# Patient Record
Sex: FEMALE | Race: WHITE | NOT HISPANIC OR LATINO | Employment: OTHER | RURAL
[De-identification: names, ages, dates, MRNs, and addresses within clinical notes are randomized per-mention and may not be internally consistent; named-entity substitution may affect disease eponyms.]

---

## 2024-01-14 ENCOUNTER — HOSPITAL ENCOUNTER (INPATIENT)
Facility: HOSPITAL | Age: 89
LOS: 9 days | Discharge: NURSING, REHAB OR LONG-TERM CARE | DRG: 189 | End: 2024-01-23
Attending: EMERGENCY MEDICINE | Admitting: STUDENT IN AN ORGANIZED HEALTH CARE EDUCATION/TRAINING PROGRAM
Payer: MEDICARE

## 2024-01-14 ENCOUNTER — HOSPITAL ENCOUNTER (EMERGENCY)
Facility: HOSPITAL | Age: 89
Discharge: ANOTHER HOSPITAL | End: 2024-01-14
Payer: MEDICARE

## 2024-01-14 VITALS
SYSTOLIC BLOOD PRESSURE: 102 MMHG | DIASTOLIC BLOOD PRESSURE: 93 MMHG | OXYGEN SATURATION: 97 % | BODY MASS INDEX: 30.37 KG/M2 | TEMPERATURE: 98 F | HEART RATE: 136 BPM | HEIGHT: 66 IN | WEIGHT: 189 LBS | RESPIRATION RATE: 32 BRPM

## 2024-01-14 DIAGNOSIS — I48.91 ATRIAL FIBRILLATION WITH RAPID VENTRICULAR RESPONSE: ICD-10-CM

## 2024-01-14 DIAGNOSIS — R06.02 SOB (SHORTNESS OF BREATH): ICD-10-CM

## 2024-01-14 DIAGNOSIS — I34.0 NONRHEUMATIC MITRAL VALVE REGURGITATION: ICD-10-CM

## 2024-01-14 DIAGNOSIS — I50.9 CONGESTIVE HEART FAILURE, UNSPECIFIED HF CHRONICITY, UNSPECIFIED HEART FAILURE TYPE: ICD-10-CM

## 2024-01-14 DIAGNOSIS — J90 PLEURAL EFFUSION: ICD-10-CM

## 2024-01-14 DIAGNOSIS — I48.91 ATRIAL FIBRILLATION WITH RAPID VENTRICULAR RESPONSE: Primary | ICD-10-CM

## 2024-01-14 DIAGNOSIS — I21.4 NSTEMI (NON-ST ELEVATED MYOCARDIAL INFARCTION): ICD-10-CM

## 2024-01-14 DIAGNOSIS — I50.9 ACUTE CONGESTIVE HEART FAILURE, UNSPECIFIED HEART FAILURE TYPE: Primary | ICD-10-CM

## 2024-01-14 DIAGNOSIS — I48.91 ATRIAL FIBRILLATION WITH RVR: ICD-10-CM

## 2024-01-14 DIAGNOSIS — J96.01 ACUTE RESPIRATORY FAILURE WITH HYPOXIA: ICD-10-CM

## 2024-01-14 DIAGNOSIS — I50.21 ACUTE SYSTOLIC (CONGESTIVE) HEART FAILURE: ICD-10-CM

## 2024-01-14 DIAGNOSIS — R07.9 CHEST PAIN, UNSPECIFIED TYPE: ICD-10-CM

## 2024-01-14 DIAGNOSIS — R06.02 SHORTNESS OF BREATH: ICD-10-CM

## 2024-01-14 DIAGNOSIS — J18.9 PNEUMONIA OF RIGHT LUNG DUE TO INFECTIOUS ORGANISM, UNSPECIFIED PART OF LUNG: ICD-10-CM

## 2024-01-14 DIAGNOSIS — I10 MALIGNANT HYPERTENSION: ICD-10-CM

## 2024-01-14 PROBLEM — I16.0 HYPERTENSIVE URGENCY: Status: ACTIVE | Noted: 2024-01-14

## 2024-01-14 LAB
ALBUMIN SERPL BCP-MCNC: 3.6 G/DL (ref 3.5–5)
ALBUMIN/GLOB SERPL: 0.8 {RATIO}
ALP SERPL-CCNC: 82 U/L (ref 55–142)
ALT SERPL W P-5'-P-CCNC: 73 U/L (ref 13–56)
ANION GAP SERPL CALCULATED.3IONS-SCNC: 10 MMOL/L (ref 7–16)
ANION GAP SERPL CALCULATED.3IONS-SCNC: 11 MMOL/L (ref 7–16)
AST SERPL W P-5'-P-CCNC: 43 U/L (ref 15–37)
BACTERIA #/AREA URNS HPF: ABNORMAL /HPF
BILIRUB SERPL-MCNC: 0.5 MG/DL (ref ?–1.2)
BILIRUB UR QL STRIP: ABNORMAL
BUN SERPL-MCNC: 25 MG/DL (ref 7–18)
BUN SERPL-MCNC: 27 MG/DL (ref 7–18)
BUN/CREAT SERPL: 27 (ref 6–20)
BUN/CREAT SERPL: 29 (ref 6–20)
CALCIUM SERPL-MCNC: 9.4 MG/DL (ref 8.5–10.1)
CALCIUM SERPL-MCNC: 9.9 MG/DL (ref 8.5–10.1)
CHLORIDE SERPL-SCNC: 100 MMOL/L (ref 98–107)
CHLORIDE SERPL-SCNC: 99 MMOL/L (ref 98–107)
CLARITY UR: ABNORMAL
CO2 SERPL-SCNC: 28 MMOL/L (ref 21–32)
CO2 SERPL-SCNC: 28 MMOL/L (ref 21–32)
COLOR UR: YELLOW
CREAT SERPL-MCNC: 0.92 MG/DL (ref 0.55–1.02)
CREAT SERPL-MCNC: 0.92 MG/DL (ref 0.55–1.02)
D DIMER PPP FEU-MCNC: 4.29 ΜG/ML (ref 0–0.47)
EGFR (NO RACE VARIABLE) (RUSH/TITUS): 59 ML/MIN/1.73M2
EGFR (NO RACE VARIABLE) (RUSH/TITUS): 59 ML/MIN/1.73M2
ERYTHROCYTE [DISTWIDTH] IN BLOOD BY AUTOMATED COUNT: 13.4 % (ref 11.5–14.5)
FLUAV AG UPPER RESP QL IA.RAPID: NEGATIVE
FLUBV AG UPPER RESP QL IA.RAPID: NEGATIVE
GLOBULIN SER-MCNC: 4.4 G/DL (ref 2–4)
GLUCOSE SERPL-MCNC: 123 MG/DL (ref 74–106)
GLUCOSE SERPL-MCNC: 128 MG/DL (ref 74–106)
GLUCOSE SERPL-MCNC: 136 MG/DL (ref 70–105)
GLUCOSE UR STRIP-MCNC: NEGATIVE MG/DL
HCO3 UR-SCNC: 27.2 MMOL/L (ref 24–28)
HCT VFR BLD AUTO: 46 % (ref 38–47)
HGB BLD-MCNC: 14.7 G/DL (ref 12–16)
KETONES UR STRIP-SCNC: ABNORMAL MG/DL
LACTATE SERPL-SCNC: 0.9 MMOL/L (ref 0.4–2)
LEUKOCYTE ESTERASE UR QL STRIP: ABNORMAL
LYMPHOCYTES NFR BLD AUTO: 15.9 % (ref 27–41)
LYMPHOCYTES NFR BLD MANUAL: 23 % (ref 27–41)
MAGNESIUM SERPL-MCNC: 2 MG/DL (ref 1.7–2.3)
MAGNESIUM SERPL-MCNC: 2.6 MG/DL (ref 1.7–2.3)
MCH RBC QN AUTO: 30.8 PG (ref 27–31)
MCHC RBC AUTO-ENTMCNC: 32 G/DL (ref 32–36)
MCV RBC AUTO: 96.2 FL (ref 80–96)
MONOCYTES NFR BLD MANUAL: 3 % (ref 2–6)
MPC BLD CALC-MCNC: 10.3 FL (ref 9.4–12.4)
NEUTROPHILS NFR BLD AUTO: 79.7 % (ref 53–65)
NEUTS SEG NFR BLD MANUAL: 74 % (ref 50–62)
NITRITE UR QL STRIP: POSITIVE
NRBC BLD MANUAL-RTO: ABNORMAL %
NT-PROBNP SERPL-MCNC: ABNORMAL PG/ML (ref 1–450)
NT-PROBNP SERPL-MCNC: ABNORMAL PG/ML (ref 1–450)
PCO2 BLDA: 46 MMHG (ref 41–51)
PH SMN: 7.38 [PH] (ref 7.32–7.42)
PH UR STRIP: 5 PH UNITS
PLATELET # BLD AUTO: 263 K/UL (ref 150–400)
PLATELET MORPHOLOGY: NORMAL
PO2 BLDA: 57 MMHG (ref 25–40)
POC BASE EXCESS: 1.6 MMOL/L (ref -2–3)
POC CO2: 28.6 MMOL/L
POC SATURATED O2: 89 %
POTASSIUM SERPL-SCNC: 4.9 MMOL/L (ref 3.5–5.1)
POTASSIUM SERPL-SCNC: 5.2 MMOL/L (ref 3.5–5.1)
PROT SERPL-MCNC: 8 G/DL (ref 6.4–8.2)
PROT UR QL STRIP: 100
RBC # BLD AUTO: 4.78 M/UL (ref 4.2–5.4)
RBC # UR STRIP: ABNORMAL /UL
RBC #/AREA URNS HPF: ABNORMAL /HPF
RBC MORPH BLD: NORMAL
SARS-COV-2 RDRP RESP QL NAA+PROBE: NEGATIVE
SODIUM SERPL-SCNC: 132 MMOL/L (ref 136–145)
SODIUM SERPL-SCNC: 134 MMOL/L (ref 136–145)
SP GR UR STRIP: >=1.03
SQUAMOUS #/AREA URNS LPF: ABNORMAL /LPF
T4 FREE SERPL-MCNC: 1.05 NG/DL (ref 0.76–1.46)
TRANS CELLS #/AREA URNS LPF: ABNORMAL /LPF
TROPONIN I SERPL DL<=0.01 NG/ML-MCNC: 104.4 PG/ML
TROPONIN I SERPL DL<=0.01 NG/ML-MCNC: 109.3 PG/ML
TSH SERPL DL<=0.005 MIU/L-ACNC: 10.4 UIU/ML (ref 0.36–3.74)
UROBILINOGEN UR STRIP-ACNC: 0.2 MG/DL
WBC # BLD AUTO: 7.9 K/UL (ref 4.5–11)
WBC #/AREA URNS HPF: ABNORMAL /HPF

## 2024-01-14 PROCEDURE — 63600175 PHARM REV CODE 636 W HCPCS: Performed by: STUDENT IN AN ORGANIZED HEALTH CARE EDUCATION/TRAINING PROGRAM

## 2024-01-14 PROCEDURE — 25000003 PHARM REV CODE 250: Performed by: STUDENT IN AN ORGANIZED HEALTH CARE EDUCATION/TRAINING PROGRAM

## 2024-01-14 PROCEDURE — 25000003 PHARM REV CODE 250

## 2024-01-14 PROCEDURE — 82962 GLUCOSE BLOOD TEST: CPT

## 2024-01-14 PROCEDURE — 82803 BLOOD GASES ANY COMBINATION: CPT

## 2024-01-14 PROCEDURE — 80048 BASIC METABOLIC PNL TOTAL CA: CPT | Mod: XB | Performed by: STUDENT IN AN ORGANIZED HEALTH CARE EDUCATION/TRAINING PROGRAM

## 2024-01-14 PROCEDURE — 20000000 HC ICU ROOM

## 2024-01-14 PROCEDURE — 99900035 HC TECH TIME PER 15 MIN (STAT)

## 2024-01-14 PROCEDURE — 99900031 HC PATIENT EDUCATION (STAT)

## 2024-01-14 PROCEDURE — 85025 COMPLETE CBC W/AUTO DIFF WBC: CPT

## 2024-01-14 PROCEDURE — 96375 TX/PRO/DX INJ NEW DRUG ADDON: CPT

## 2024-01-14 PROCEDURE — 63600175 PHARM REV CODE 636 W HCPCS

## 2024-01-14 PROCEDURE — 99285 EMERGENCY DEPT VISIT HI MDM: CPT | Mod: 25

## 2024-01-14 PROCEDURE — 87086 URINE CULTURE/COLONY COUNT: CPT

## 2024-01-14 PROCEDURE — 84443 ASSAY THYROID STIM HORMONE: CPT | Performed by: EMERGENCY MEDICINE

## 2024-01-14 PROCEDURE — 83880 ASSAY OF NATRIURETIC PEPTIDE: CPT | Performed by: EMERGENCY MEDICINE

## 2024-01-14 PROCEDURE — 83605 ASSAY OF LACTIC ACID: CPT | Performed by: EMERGENCY MEDICINE

## 2024-01-14 PROCEDURE — 99285 EMERGENCY DEPT VISIT HI MDM: CPT | Mod: GF

## 2024-01-14 PROCEDURE — 83735 ASSAY OF MAGNESIUM: CPT

## 2024-01-14 PROCEDURE — 63600175 PHARM REV CODE 636 W HCPCS: Mod: JZ,JG | Performed by: STUDENT IN AN ORGANIZED HEALTH CARE EDUCATION/TRAINING PROGRAM

## 2024-01-14 PROCEDURE — 94761 N-INVAS EAR/PLS OXIMETRY MLT: CPT

## 2024-01-14 PROCEDURE — 81003 URINALYSIS AUTO W/O SCOPE: CPT

## 2024-01-14 PROCEDURE — 99291 CRITICAL CARE FIRST HOUR: CPT | Mod: ,,, | Performed by: STUDENT IN AN ORGANIZED HEALTH CARE EDUCATION/TRAINING PROGRAM

## 2024-01-14 PROCEDURE — 84145 PROCALCITONIN (PCT): CPT | Mod: 90 | Performed by: STUDENT IN AN ORGANIZED HEALTH CARE EDUCATION/TRAINING PROGRAM

## 2024-01-14 PROCEDURE — 96374 THER/PROPH/DIAG INJ IV PUSH: CPT

## 2024-01-14 PROCEDURE — 94660 CPAP INITIATION&MGMT: CPT

## 2024-01-14 PROCEDURE — 93010 ELECTROCARDIOGRAM REPORT: CPT | Performed by: HOSPITALIST

## 2024-01-14 PROCEDURE — 83880 ASSAY OF NATRIURETIC PEPTIDE: CPT

## 2024-01-14 PROCEDURE — 84484 ASSAY OF TROPONIN QUANT: CPT | Performed by: EMERGENCY MEDICINE

## 2024-01-14 PROCEDURE — 87635 SARS-COV-2 COVID-19 AMP PRB: CPT

## 2024-01-14 PROCEDURE — 93005 ELECTROCARDIOGRAM TRACING: CPT

## 2024-01-14 PROCEDURE — 63600175 PHARM REV CODE 636 W HCPCS: Performed by: EMERGENCY MEDICINE

## 2024-01-14 PROCEDURE — 99291 CRITICAL CARE FIRST HOUR: CPT | Mod: ,,, | Performed by: EMERGENCY MEDICINE

## 2024-01-14 PROCEDURE — 5A09357 ASSISTANCE WITH RESPIRATORY VENTILATION, LESS THAN 24 CONSECUTIVE HOURS, CONTINUOUS POSITIVE AIRWAY PRESSURE: ICD-10-PCS | Performed by: STUDENT IN AN ORGANIZED HEALTH CARE EDUCATION/TRAINING PROGRAM

## 2024-01-14 PROCEDURE — 27000221 HC OXYGEN, UP TO 24 HOURS

## 2024-01-14 PROCEDURE — 83735 ASSAY OF MAGNESIUM: CPT | Performed by: STUDENT IN AN ORGANIZED HEALTH CARE EDUCATION/TRAINING PROGRAM

## 2024-01-14 PROCEDURE — 87040 BLOOD CULTURE FOR BACTERIA: CPT | Performed by: EMERGENCY MEDICINE

## 2024-01-14 PROCEDURE — 87040 BLOOD CULTURE FOR BACTERIA: CPT | Performed by: STUDENT IN AN ORGANIZED HEALTH CARE EDUCATION/TRAINING PROGRAM

## 2024-01-14 PROCEDURE — 36600 WITHDRAWAL OF ARTERIAL BLOOD: CPT

## 2024-01-14 PROCEDURE — 87804 INFLUENZA ASSAY W/OPTIC: CPT | Mod: 59

## 2024-01-14 PROCEDURE — 27000190 HC CPAP FULL FACE MASK W/VALVE

## 2024-01-14 PROCEDURE — 85379 FIBRIN DEGRADATION QUANT: CPT

## 2024-01-14 PROCEDURE — 25000242 PHARM REV CODE 250 ALT 637 W/ HCPCS

## 2024-01-14 PROCEDURE — 25500020 PHARM REV CODE 255: Performed by: EMERGENCY MEDICINE

## 2024-01-14 PROCEDURE — 84484 ASSAY OF TROPONIN QUANT: CPT

## 2024-01-14 PROCEDURE — 84439 ASSAY OF FREE THYROXINE: CPT | Performed by: EMERGENCY MEDICINE

## 2024-01-14 PROCEDURE — 80053 COMPREHEN METABOLIC PANEL: CPT

## 2024-01-14 RX ORDER — POLYETHYLENE GLYCOL 3350 17 G/17G
17 POWDER, FOR SOLUTION ORAL 2 TIMES DAILY PRN
Status: DISCONTINUED | OUTPATIENT
Start: 2024-01-14 | End: 2024-01-23 | Stop reason: HOSPADM

## 2024-01-14 RX ORDER — AMIODARONE HYDROCHLORIDE 150 MG/3ML
150 INJECTION, SOLUTION INTRAVENOUS
Status: COMPLETED | OUTPATIENT
Start: 2024-01-14 | End: 2024-01-14

## 2024-01-14 RX ORDER — DILTIAZEM HYDROCHLORIDE 5 MG/ML
10 INJECTION INTRAVENOUS
Status: COMPLETED | OUTPATIENT
Start: 2024-01-14 | End: 2024-01-14

## 2024-01-14 RX ORDER — DILTIAZEM HYDROCHLORIDE 5 MG/ML
10 INJECTION INTRAVENOUS
Status: DISCONTINUED | OUTPATIENT
Start: 2024-01-14 | End: 2024-01-14 | Stop reason: HOSPADM

## 2024-01-14 RX ORDER — DOCUSATE SODIUM 100 MG/1
100 CAPSULE, LIQUID FILLED ORAL DAILY PRN
Status: DISCONTINUED | OUTPATIENT
Start: 2024-01-14 | End: 2024-01-23 | Stop reason: HOSPADM

## 2024-01-14 RX ORDER — ASPIRIN 325 MG
325 TABLET ORAL
Status: COMPLETED | OUTPATIENT
Start: 2024-01-14 | End: 2024-01-14

## 2024-01-14 RX ORDER — DIGOXIN 0.25 MG/ML
500 INJECTION INTRAMUSCULAR; INTRAVENOUS
Status: COMPLETED | OUTPATIENT
Start: 2024-01-14 | End: 2024-01-14

## 2024-01-14 RX ORDER — ONDANSETRON HYDROCHLORIDE 2 MG/ML
4 INJECTION, SOLUTION INTRAVENOUS EVERY 8 HOURS PRN
Status: DISCONTINUED | OUTPATIENT
Start: 2024-01-14 | End: 2024-01-23 | Stop reason: HOSPADM

## 2024-01-14 RX ORDER — FUROSEMIDE 10 MG/ML
40 INJECTION INTRAMUSCULAR; INTRAVENOUS
Status: COMPLETED | OUTPATIENT
Start: 2024-01-14 | End: 2024-01-14

## 2024-01-14 RX ORDER — INSULIN ASPART 100 [IU]/ML
0-5 INJECTION, SOLUTION INTRAVENOUS; SUBCUTANEOUS EVERY 6 HOURS PRN
Status: DISCONTINUED | OUTPATIENT
Start: 2024-01-14 | End: 2024-01-23 | Stop reason: HOSPADM

## 2024-01-14 RX ORDER — GLUCAGON 1 MG
1 KIT INJECTION
Status: DISCONTINUED | OUTPATIENT
Start: 2024-01-14 | End: 2024-01-23 | Stop reason: HOSPADM

## 2024-01-14 RX ORDER — FUROSEMIDE 10 MG/ML
20 INJECTION INTRAMUSCULAR; INTRAVENOUS
Status: COMPLETED | OUTPATIENT
Start: 2024-01-14 | End: 2024-01-14

## 2024-01-14 RX ORDER — NAPROXEN SODIUM 220 MG
220 TABLET ORAL 2 TIMES DAILY WITH MEALS
Status: ON HOLD | COMMUNITY
End: 2024-01-23 | Stop reason: HOSPADM

## 2024-01-14 RX ORDER — NITROGLYCERIN 20 MG/100ML
0-400 INJECTION INTRAVENOUS CONTINUOUS
Status: DISCONTINUED | OUTPATIENT
Start: 2024-01-14 | End: 2024-01-16

## 2024-01-14 RX ORDER — IPRATROPIUM BROMIDE AND ALBUTEROL SULFATE 2.5; .5 MG/3ML; MG/3ML
3 SOLUTION RESPIRATORY (INHALATION) EVERY 6 HOURS PRN
Status: DISCONTINUED | OUTPATIENT
Start: 2024-01-14 | End: 2024-01-23 | Stop reason: HOSPADM

## 2024-01-14 RX ORDER — NITROGLYCERIN 20 MG/100ML
0-400 INJECTION INTRAVENOUS CONTINUOUS
Status: DISCONTINUED | OUTPATIENT
Start: 2024-01-14 | End: 2024-01-14

## 2024-01-14 RX ORDER — IPRATROPIUM BROMIDE AND ALBUTEROL SULFATE 2.5; .5 MG/3ML; MG/3ML
3 SOLUTION RESPIRATORY (INHALATION)
Status: COMPLETED | OUTPATIENT
Start: 2024-01-14 | End: 2024-01-14

## 2024-01-14 RX ORDER — SODIUM CHLORIDE 0.9 % (FLUSH) 0.9 %
10 SYRINGE (ML) INJECTION
Status: DISCONTINUED | OUTPATIENT
Start: 2024-01-14 | End: 2024-01-23 | Stop reason: HOSPADM

## 2024-01-14 RX ADMIN — AMIODARONE HYDROCHLORIDE 150 MG: 1.5 INJECTION, SOLUTION INTRAVENOUS at 06:01

## 2024-01-14 RX ADMIN — DIGOXIN 500 MCG: 250 INJECTION, SOLUTION INTRAMUSCULAR; INTRAVENOUS; PARENTERAL at 02:01

## 2024-01-14 RX ADMIN — FUROSEMIDE 20 MG: 10 INJECTION, SOLUTION INTRAMUSCULAR; INTRAVENOUS at 02:01

## 2024-01-14 RX ADMIN — TICAGRELOR 180 MG: 90 TABLET ORAL at 02:01

## 2024-01-14 RX ADMIN — AMIODARONE HYDROCHLORIDE 1 MG/MIN: 1.8 INJECTION, SOLUTION INTRAVENOUS at 06:01

## 2024-01-14 RX ADMIN — NITROGLYCERIN 20 MCG/MIN: 20 INJECTION INTRAVENOUS at 06:01

## 2024-01-14 RX ADMIN — DEXTROSE MONOHYDRATE 1 G: 5 INJECTION INTRAVENOUS at 06:01

## 2024-01-14 RX ADMIN — ASPIRIN 325 MG ORAL TABLET 325 MG: 325 PILL ORAL at 01:01

## 2024-01-14 RX ADMIN — AMIODARONE HYDROCHLORIDE 1 MG/MIN: 1.8 INJECTION, SOLUTION INTRAVENOUS at 10:01

## 2024-01-14 RX ADMIN — AMIODARONE HYDROCHLORIDE 150 MG: 50 INJECTION, SOLUTION INTRAVENOUS at 02:01

## 2024-01-14 RX ADMIN — DILTIAZEM HYDROCHLORIDE 10 MG: 5 INJECTION INTRAVENOUS at 01:01

## 2024-01-14 RX ADMIN — AZITHROMYCIN MONOHYDRATE 500 MG: 500 INJECTION, POWDER, LYOPHILIZED, FOR SOLUTION INTRAVENOUS at 06:01

## 2024-01-14 RX ADMIN — NITROGLYCERIN 10 MCG/MIN: 20 INJECTION INTRAVENOUS at 04:01

## 2024-01-14 RX ADMIN — FUROSEMIDE 40 MG: 10 INJECTION, SOLUTION INTRAMUSCULAR; INTRAVENOUS at 04:01

## 2024-01-14 RX ADMIN — IPRATROPIUM BROMIDE AND ALBUTEROL SULFATE 3 ML: 2.5; .5 SOLUTION RESPIRATORY (INHALATION) at 02:01

## 2024-01-14 RX ADMIN — NITROGLYCERIN 20 MCG/MIN: 20 INJECTION INTRAVENOUS at 07:01

## 2024-01-14 RX ADMIN — IOPAMIDOL 100 ML: 755 INJECTION, SOLUTION INTRAVENOUS at 04:01

## 2024-01-14 NOTE — ED NOTES
MD advise to hold off on Diltiazem while nurse in process Np advise patient was given half of ordered dose. Patient only received 5mg . Held off on other 5mg

## 2024-01-14 NOTE — HPI
89 yo F with no significant medical history, albeit she has not established with any care, presented via OSH with complaints of 3 day history of progressive shortness of breath associated with chest discomfort and found to be in AFib with RVR with transfer in-house and thereafter admission to the ICU for management of severe respiratory distress and AFib with RVR.      On presentation to outside hospital she was noted to be in acute respiratory distress we will with tachypnea, hypoxia with SpO2 85% and found to be in AFib with RVR rates to 150s with no hypotension. While at outside hospital she was started on non-rebreather with course notable for progression in her respiratory distress with plan for transfer in-house for higher level of care.  While there, she received management including diltiazem 5 mg (dose interrupted due to concern for heart failure), digoxin 500 mcg, Lasix 20 mg IV, ticagrelor load 180 mg with transfer thereafter to our ED. on presentation here, she was noted to be in respiratory distress following CTA chest and was initiated on BiPAP.  At time of my assessment, she reports having improvement in her dyspnea with persistent tachypnea.  She is able to speak in full sentences and prefers to communicate by writing down on paper.    At end of my assessment, HR 140s in irregular rhythm, on NIV with SpO2 greater than 95%, BP 150s to 160s over 90s to 110s.  Labs are notable for unremarkable CBC, creatinine 0.92, mild transaminitis with AST/ALT 43/73, NT pro BNP 62132, free T4 within normal limits at 1.05 and troponin flat x2 (104, 109).  ABG reviewed with the P/F of 142.  Imaging with CTA chest notable for compressive atelectasis versus consolidation in right lower lobe and bilateral right greater than left pleural effusion, LA and RA dilation and no PE.  Cardiology was consulted during ED course with initiation of a nitroglycerin drip for management of hypertensive urgency.

## 2024-01-14 NOTE — ED NOTES
Patient 02 Saturation started to decrease, Michael Np was notified and patient was placed on a nonrebreather. She is also now complaining of right shoulder pain, and posterior rib pain. She is also requiring to sit at 90 degree to feel less short of breath

## 2024-01-14 NOTE — ED PROVIDER NOTES
Encounter Date: 1/14/2024       History     Chief Complaint   Patient presents with    Shortness of Breath     Patient is a 89-year-old white female who presents to emergency department complaints of shortness of breath and chest pain over the past 3 days.  She reports that the shortness of breath is worse when she lays down flat.  She has a poor historian.  She reports that she does not currently follow up with any providers and season as specialist.  She describes the pain as a pressure in the center of her chest that radiates around to her ribs.  Her blood pressure is 141/96, heart rate 154, respirations 40, oxygen saturation 85% on room air.  She does appear acutely distressed.     The history is provided by the patient.     Review of patient's allergies indicates:   Allergen Reactions    Aspirin      Patient denies any rash or airway issue with aspirin     History reviewed. No pertinent past medical history.  History reviewed. No pertinent surgical history.  History reviewed. No pertinent family history.     Review of Systems   Constitutional:  Negative for activity change, appetite change, chills and fever.   HENT:  Positive for congestion. Negative for sore throat and trouble swallowing.    Eyes: Negative.    Respiratory:  Positive for cough, chest tightness and shortness of breath. Negative for stridor.    Cardiovascular:  Positive for chest pain and palpitations.   Gastrointestinal:  Negative for abdominal pain, diarrhea, nausea and vomiting.   Endocrine: Negative.    Genitourinary:  Negative for difficulty urinating, dysuria and frequency.   Musculoskeletal:  Negative for arthralgias, back pain, neck pain and neck stiffness.   Skin:  Negative for color change, pallor and rash.   Allergic/Immunologic: Negative.    Neurological:  Negative for dizziness, seizures, syncope, facial asymmetry, speech difficulty, weakness, light-headedness and headaches.   Hematological:  Does not bruise/bleed easily.    Psychiatric/Behavioral:  Negative for confusion. The patient is nervous/anxious.    All other systems reviewed and are negative.      Physical Exam     Initial Vitals   BP Pulse Resp Temp SpO2   01/14/24 1324 01/14/24 1324 01/14/24 1324 01/14/24 1421 01/14/24 1324   (!) 141/96 (!) 154 (!) 40 97.7 °F (36.5 °C) (!) 85 %      MAP       --                Physical Exam    Nursing note and vitals reviewed.  Constitutional: She appears well-developed and well-nourished. She appears distressed.   HENT:   Head: Normocephalic and atraumatic.   Mouth/Throat: Oropharynx is clear and moist.   Eyes: Conjunctivae and EOM are normal. Pupils are equal, round, and reactive to light.   Neck: Neck supple.   Normal range of motion.  Cardiovascular:  Normal pulses. An irregular rhythm present.   Tachycardia present.         Pulmonary/Chest: She is in respiratory distress. She has wheezes. She has rales.   Abdominal: Abdomen is soft. Bowel sounds are normal. She exhibits no distension. There is no abdominal tenderness. There is no rebound and no guarding.   Musculoskeletal:         General: Normal range of motion.      Cervical back: Normal range of motion and neck supple.     Neurological: She is alert and oriented to person, place, and time. She has normal strength. GCS score is 15. GCS eye subscore is 4. GCS verbal subscore is 5. GCS motor subscore is 6.   Skin: Skin is warm and dry. Capillary refill takes less than 2 seconds.   Psychiatric: She has a normal mood and affect. Her behavior is normal. Judgment and thought content normal.         Medical Screening Exam   See Full Note    ED Course   Procedures  Labs Reviewed   COMPREHENSIVE METABOLIC PANEL - Abnormal; Notable for the following components:       Result Value    Sodium 132 (*)     Glucose 123 (*)     BUN 25 (*)     BUN/Creatinine Ratio 27 (*)     Globulin 4.4 (*)     ALT 73 (*)     AST 43 (*)     eGFR 59 (*)     All other components within normal limits   NT-PRO  NATRIURETIC PEPTIDE - Abnormal; Notable for the following components:    ProBNP 11,211 (*)     All other components within normal limits   TROPONIN I - Abnormal; Notable for the following components:    Troponin I High Sensitivity 104.4 (*)     All other components within normal limits   URINALYSIS, REFLEX TO URINE CULTURE - Abnormal; Notable for the following components:    Leukocytes, UA Trace (*)     Nitrites, UA Positive (*)     Protein,  (*)     Bilirubin, UA Small (*)     Blood, UA Trace-Intact (*)     Specific Gravity, UA >=1.030 (*)     All other components within normal limits   CBC WITH DIFFERENTIAL - Abnormal; Notable for the following components:    MCV 96.2 (*)     Neutrophils % 79.7 (*)     Lymphocytes % 15.9 (*)     All other components within normal limits   MANUAL DIFFERENTIAL - Abnormal; Notable for the following components:    Segmented Neutrophils, Man % 74 (*)     Lymphocytes, Man % 23 (*)     All other components within normal limits   D DIMER, QUANTITATIVE - Abnormal; Notable for the following components:    D-Dimer 4.29 (*)     All other components within normal limits   RAPID INFLUENZA A/B - Normal   MAGNESIUM - Normal   SARS-COV-2 RNA AMPLIFICATION, QUAL - Normal    Narrative:     Negative SARS-CoV results should not be used as the sole basis for treatment or patient management decisions; negative results should be considered in the context of a patient's recent exposures, history and the presene of clinical signs and symptoms consistent with COVID-19.  Negative results should be treated as presumptive and confirmed by molecular assay, if necessary for patient management.   CBC W/ AUTO DIFFERENTIAL    Narrative:     The following orders were created for panel order CBC auto differential.  Procedure                               Abnormality         Status                     ---------                               -----------         ------                     CBC with  Differential[[id number]]       Abnormal            Final result               Manual Differential[[id number]]         Abnormal            Final result                 Please view results for these tests on the individual orders.   URINALYSIS, MICROSCOPIC          Imaging Results              X-Ray Chest AP Portable (In process)                      Medications   albuterol-ipratropium 2.5 mg-0.5 mg/3 mL nebulizer solution 3 mL (3 mLs Nebulization Given 1/14/24 1403)   diltiaZEM injection 10 mg (10 mg Intravenous Given 1/14/24 1343)   aspirin tablet 325 mg (325 mg Oral Given 1/14/24 1355)   amiodarone injection 150 mg (150 mg Intravenous Given 1/14/24 1454)   digoxin injection 500 mcg (500 mcg Intravenous Given 1/14/24 1413)   ticagrelor tablet 180 mg (180 mg Oral Given 1/14/24 1428)   furosemide injection 20 mg (20 mg Intravenous Given 1/14/24 1422)     Medical Decision Making  Patient presents with shortness of breath or chest pain.  Oxygen saturation on room air was noted to be in the mid 80s.  We did apply nasal cannula initially and ultimately upgraded to a non-rebreather at 100%.  EKGs performed shows atrial fib with RVR at a rate of 130s to 150s.  There was concern for elevation in V1 through V4.  She denies any known history of atrial fib.  Denies any prior formal cardiology evaluation.  We did contact Dr. Ulloa who is on-call for [facility] with concerns related to the patient's AFib with RVR and possible elevation.  He advises no STEMI at this time.  He does recommend that we continue to attempt rate control.  We did initially order Cardizem 10 mg IV and the patient received a proximally 5 mg IV at the time of cardiology consultation.  He recommends that we load the patient with 150 mg of amiodarone, 500 mcg of digoxin, aspirin 325 mg and Brilinta 180 mg.  Also recommends that we repeat the EKG and sent him once she is further rate control.  EKG was repeated at 104 beats per minute after  the 5 mg of Cardizem and 150 mg of amiodarone.  It was reviewed by Dr. Ulloa who confirms no STEMI at this time.  He does recommend that we rule out PE.  We will add a D-dimer.  Patient was accepted to [facility] for an ER to ER transfer by Dr. Carcamo.  Case was discussed with him along with her updated results which included a BNP of greater than 11,000.  Chest x-ray does appear to show some findings consistent with CHF.  We did discuss giving her a 1 time dose of Lasix 20 mg IV here which we will complete before transfer.  We will also place a Nelson catheter. [facility] was contacted as well for transport.    Amount and/or Complexity of Data Reviewed  Independent Historian:      Details: Patient is a 89-year-old white female who presents to emergency department complaints of shortness of breath and chest pain over the past 3 days.  She reports that the shortness of breath is worse when she lays down flat.  She has a poor historian.  She reports that she does not currently follow up with any providers and season as specialist.  She describes the pain as a pressure in the center of her chest that radiates around to her ribs.  Her blood pressure is 141/96, heart rate 154, respirations 40, oxygen saturation 85% on room air.  She does appear acutely distressed.   Labs: ordered.     Details: CBC shows a WBC of 7.90, hemoglobin of 14.7, hematocrit of 46.0, platelet count 263, troponin is elevated at 104.4, [place]NP [place] 26[address], CMP shows a sodium of 132, glucose of 123, BUN 25, normal creatinine of 0.92, BUN/creatinine ratio of 27, albumin 3.6, globulin of 4.4, ALT of 73, AST of 43, and a GFR of 59, magnesium was normal at 2.0.  D-dimer was added and is pending.  Radiology: ordered.     Details: Personal review of the chest x-ray does appear to show some findings consistent with CHF.  Final report pending at the time of transfer.  Discussion of management or test interpretation with external provider(s): Case was  discussed with Dr. Ulloa and Dr. Carcamo at [facility].  Patient was accepted for transfer.  See above for further details.    Risk  OTC drugs.  Prescription drug management.  Risk Details: All historical, clinical, radiographic, and laboratory findings were reviewed with the patient/family in detail along with the indications for transport to the facility in [facility] in order to receive further evaluation and treatment.  All remaining questions and concerns were addressed at this time and the patient/family communicates understanding and agrees to proceed accordingly.  Similarly, all pertinent details of the encounter were discussed with Dr. Carcamo who agrees to receive the patient at [facility] for further care as outlined above.  [facility] secondary to a need for ongoing hemodynamic monitoring en route.  ALVARO CASTAÑEDA  2:29 PM                                           Clinical Impression:   Final diagnoses:  [R06.02] Shortness of breath  [I48.91] Atrial fibrillation with rapid ventricular response (Primary)  [I21.4] NSTEMI (non-ST elevated myocardial infarction)  [R07.9] Chest pain, unspecified type  [R06.02] SOB (shortness of breath)        ED Disposition Condition    Transfer to Another Facility Stable                Chapin Membreno FNP  01/14/24 3524

## 2024-01-14 NOTE — ED PROVIDER NOTES
Encounter Date: 1/14/2024    SCRIBE #1 NOTE: I, Alondra Hooker, am scribing for, and in the presence of,  Raphael Carcamo MD. I have scribed the entire note.       History     Chief Complaint   Patient presents with    Shortness of Breath    Tachycardia     89 year old female presents to the ED via EMS from [facility] complaining of shortness of breath and tachycardia. Patient states that she started having SOB on 1/11 after checking the mail. She states that since then she has been having weakness as well. Patient was seen at [facility] on today complaining of SOB and CP for the past 3 days. Patient denies any Hx of DM, HTN, or MI.     The history is provided by the patient and medical records. No  was used.     Review of patient's allergies indicates:   Allergen Reactions    Aspirin      Patient denies any rash or airway issue with aspirin     No past medical history on file.  No past surgical history on file.  No family history on file.     Review of Systems   Respiratory:  Positive for shortness of breath.    Neurological:  Positive for weakness.       Physical Exam     Initial Vitals [01/14/24 1554]   BP Pulse Resp Temp SpO2   (!) 163/115 (!) 140 (!) 41 -- 95 %      MAP       --         Physical Exam    Nursing note and vitals reviewed.  Cardiovascular:            Tachycardic      Neurological: She is alert and oriented to person, place, and time.         ED Course   Procedures  Labs Reviewed   NT-PRO NATRIURETIC PEPTIDE - Abnormal; Notable for the following components:       Result Value    ProBNP 11,477 (*)     All other components within normal limits   TROPONIN I - Abnormal; Notable for the following components:    Troponin I High Sensitivity 109.3 (*)     All other components within normal limits   TSH - Abnormal; Notable for the following components:    TSH 10.400 (*)     All other components within normal limits   T4, FREE - Normal   CULTURE, BLOOD   CULTURE, BLOOD   LACTIC  ACID, PLASMA          Imaging Results              CTA Chest Non-Coronary (PE Studies) (Final result)  Result time 01/14/24 16:27:26      Final result by Robert Waller MD (01/14/24 16:27:26)                   Impression:      No evidence of acute pulmonary embolic disease    Pulmonary edema with or without superimposed right basilar pneumonia    Moderate layering pleural effusion bilaterally.      Electronically signed by: Robert Waller  Date:    01/14/2024  Time:    16:27               Narrative:    EXAMINATION:  CTA CHEST NON CORONARY (PE STUDIES)    CLINICAL HISTORY:  Pulmonary embolism (PE) suspected, positive D-dimer;.    COMPARISON:  No previous chest CT    TECHNIQUE:  Thin spiral CT sections were obtained through the neck during the dynamic IV administration of 100 ml of Isovue 370.  In addition to multiplanar reconstruction images, 3-D images were also generated, archived, and analyzed.    The CT examination was performed using one or more of the following dose reduction techniques: Automated exposure control, adjustment of the mA and kV according to patient's size, use of acute or iterative reconstruction techniques.    FINDINGS:  There is no discrete filling defect within the pulmonary arterial tree to specifically suggest acute pulmonary embolic disease.    There is no thoracic aorta aneurysm or dissection.  There is a moderate atherosclerotic irregularity and calcification of the aortic arch.    There is no mediastinal mass or mediastinal lymphadenopathy.    There is moderate layering pleural effusion bilaterally.    There is interstitial edema in either lung.  There is some asymmetric patchy airspace disease in the right lower lobe and asymmetric patchy and strandy opacity right middle lobe.  There is passive atelectasis in the lower lobes bilaterally.    There is mild cardiomegaly.    There is no definite acute process in the partially visualized upper abdomen.    There is scattered mild  degenerative disc disease of the thoracic spine.                                    X-Rays:   Independently Interpreted Readings:   Other Readings:  CT Chest Non-Coronary (PE Studies)  No evidence of acute pulmonary embolic disease     Pulmonary edema with or without superimposed right basilar pneumonia     Moderate layering pleural effusion bilaterally.         Medications   cefTRIAXone (ROCEPHIN) 1 g in dextrose 5 % in water (D5W) 100 mL IVPB (MB+) (has no administration in time range)   azithromycin (ZITHROMAX) 500 mg in dextrose 5 % (D5W) 250 mL IVPB (has no administration in time range)   nitroGLYCERIN in 5 % dextrose 50 mg/250 mL (200 mcg/mL) infusion (10 mcg/min Intravenous New Bag 1/14/24 1655)   iopamidoL (ISOVUE-370) injection 100 mL (100 mLs Intravenous Given 1/14/24 1618)   furosemide injection 40 mg (40 mg Intravenous Given 1/14/24 1645)     Medical Decision Making  MDM    Patient presents for emergent evaluation of acute shortness breath that poses a threat to life and/or bodily function.    In the ED patient found to have acute decompensated heart failure.  Tachycardia tachypnea.  Patient was worsening in the emergency department.  She wishes to be do not resuscitate.  She was placed on BiPAP due to severe tachypnea case was discussed with cardiologist and intensivist.  Patient placed on nitroglycerin infusion due to elevated blood pressures.  Also gave an additional dose of Lasix that was additional to the dose that was given in the transferring facility.  Troponin was repeated and was flat.  CTA shows no PE but does have bilateral pleural effusions and consolidation.  Treated empirically with IV antibiotics.  I ordered labs and personally reviewed them.  Labs significant for troponins flat elevated proBNP.    I ordered EKG and personally reviewed it.  EKG significant for tachycardia why complex not consistent with STEMI.    I ordered CT scan and personally reviewed it and reviewed the radiologist  interpretation.  CT significant for no PE.      Admission MDM  I discussed the patient presentation and findings with the consultant for cardiologist intensivist (speciality).    Patient was managed in the ED with IV nitroglycerin infusion Lasix.    The response to treatment was no significant change.    Patient required emergent consultation to intensivist (admitting physician) for admission.     Amount and/or Complexity of Data Reviewed  Labs: ordered.  Radiology: ordered.    Risk  Prescription drug management.  Decision regarding hospitalization.              Attending Attestation:         Attending Critical Care:   Critical Care Times:   Direct Patient Care (initial evaluation, reassessments, and time considering the case)................................................................10 minutes.   Additional History from reviewing old medical records or taking additional history from the family, EMS, PCP, etc.......................5 minutes.   Ordering, Reviewing, and Interpreting Diagnostic Studies...............................................................................................................5 minutes.   Documentation..................................................................................................................................................................................5 minutes.   Consultation with other Physicians. .................................................................................................................................................5 minutes.   ==============================================================  Total Critical Care Time - exclusive of procedural time: 30 minutes.  ==============================================================    Physician Attestation for Scribe:  Physician Attestation Statement for Scribe #1: I, Raphael Carcamo MD, reviewed documentation, as scribed by Alondra Hooker in my presence, and it is both accurate and  complete.             ED Course as of 01/14/24 1727   Sun Jan 14, 2024   1657 CT Chest Non-Coronary (PE Studies)  No evidence of acute pulmonary embolic disease     Pulmonary edema with or without superimposed right basilar pneumonia     Moderate layering pleural effusion bilaterally.      [AM]      ED Course User Index  [AM] Alondra Hooker                           Clinical Impression:  Final diagnoses:  [I50.9] Acute congestive heart failure, unspecified heart failure type (Primary)  [J18.9] Pneumonia of right lung due to infectious organism, unspecified part of lung  [J90] Pleural effusion  [I10] Malignant hypertension          ED Disposition Condition    Admit Stable                Raphael Carcamo MD  01/14/24 1727

## 2024-01-14 NOTE — ED TRIAGE NOTES
Presents to ED via EMS from [facility] for c/o SOB and tachycardia. Sent for admission.

## 2024-01-15 LAB
ANION GAP SERPL CALCULATED.3IONS-SCNC: 12 MMOL/L (ref 7–16)
AORTIC ROOT ANNULUS: 2.6 CM
AV INDEX (PROSTH): 0.7
AV MEAN GRADIENT: 5 MMHG
AV PEAK GRADIENT: 8 MMHG
AV VALVE AREA BY VELOCITY RATIO: 2.05 CM²
AV VALVE AREA: 2.11 CM²
AV VELOCITY RATIO: 0.68
BASOPHILS # BLD AUTO: 0.07 K/UL (ref 0–0.2)
BASOPHILS NFR BLD AUTO: 1 % (ref 0–1)
BSA FOR ECHO PROCEDURE: 1.81 M2
BUN SERPL-MCNC: 25 MG/DL (ref 7–18)
BUN/CREAT SERPL: 26 (ref 6–20)
CALCIUM SERPL-MCNC: 9.4 MG/DL (ref 8.5–10.1)
CHLORIDE SERPL-SCNC: 100 MMOL/L (ref 98–107)
CO2 SERPL-SCNC: 27 MMOL/L (ref 21–32)
CREAT SERPL-MCNC: 0.97 MG/DL (ref 0.55–1.02)
CV ECHO LV RWT: 0.38 CM
DIFFERENTIAL METHOD BLD: ABNORMAL
DOP CALC AO PEAK VEL: 1.44 M/S
DOP CALC AO VTI: 29.6 CM
DOP CALC LVOT AREA: 3 CM2
DOP CALC LVOT DIAMETER: 1.96 CM
DOP CALC LVOT PEAK VEL: 0.98 M/S
DOP CALC LVOT STROKE VOLUME: 62.42 CM3
DOP CALCLVOT PEAK VEL VTI: 20.7 CM
ECHO LV POSTERIOR WALL: 0.82 CM (ref 0.6–1.1)
EGFR (NO RACE VARIABLE) (RUSH/TITUS): 56 ML/MIN/1.73M2
EOSINOPHIL # BLD AUTO: 0.1 K/UL (ref 0–0.5)
EOSINOPHIL NFR BLD AUTO: 1.4 % (ref 1–4)
ERYTHROCYTE [DISTWIDTH] IN BLOOD BY AUTOMATED COUNT: 12.8 % (ref 11.5–14.5)
FRACTIONAL SHORTENING: 16 % (ref 28–44)
GLUCOSE SERPL-MCNC: 107 MG/DL (ref 70–105)
GLUCOSE SERPL-MCNC: 108 MG/DL (ref 74–106)
GLUCOSE SERPL-MCNC: 114 MG/DL (ref 70–105)
GLUCOSE SERPL-MCNC: 186 MG/DL (ref 70–105)
HCT VFR BLD AUTO: 39.2 % (ref 38–47)
HGB BLD-MCNC: 12.8 G/DL (ref 12–16)
IMM GRANULOCYTES # BLD AUTO: 0.02 K/UL (ref 0–0.04)
IMM GRANULOCYTES NFR BLD: 0.3 % (ref 0–0.4)
INTERVENTRICULAR SEPTUM: 1.48 CM (ref 0.6–1.1)
IVC DIAMETER: 2.11 CM
LEFT ATRIUM SIZE: 4.8 CM
LEFT INTERNAL DIMENSION IN SYSTOLE: 3.62 CM (ref 2.1–4)
LEFT VENTRICLE DIASTOLIC VOLUME INDEX: 46.32 ML/M2
LEFT VENTRICLE DIASTOLIC VOLUME: 82.92 ML
LEFT VENTRICLE MASS INDEX: 97 G/M2
LEFT VENTRICLE SYSTOLIC VOLUME INDEX: 30.8 ML/M2
LEFT VENTRICLE SYSTOLIC VOLUME: 55.08 ML
LEFT VENTRICULAR INTERNAL DIMENSION IN DIASTOLE: 4.3 CM (ref 3.5–6)
LEFT VENTRICULAR MASS: 173.65 G
LVOT MG: 1.65 MMHG
LVOT MV: 0.58 CM/S
LYMPHOCYTES # BLD AUTO: 1.62 K/UL (ref 1–4.8)
LYMPHOCYTES NFR BLD AUTO: 22.4 % (ref 27–41)
MAGNESIUM SERPL-MCNC: 2.6 MG/DL (ref 1.7–2.3)
MCH RBC QN AUTO: 30.8 PG (ref 27–31)
MCHC RBC AUTO-ENTMCNC: 32.7 G/DL (ref 32–36)
MCV RBC AUTO: 94.2 FL (ref 80–96)
MONOCYTES # BLD AUTO: 0.43 K/UL (ref 0–0.8)
MONOCYTES NFR BLD AUTO: 5.9 % (ref 2–6)
MPC BLD CALC-MCNC: 10.4 FL (ref 9.4–12.4)
NEUTROPHILS # BLD AUTO: 5 K/UL (ref 1.8–7.7)
NEUTROPHILS NFR BLD AUTO: 69 % (ref 53–65)
NRBC # BLD AUTO: 0 X10E3/UL
NRBC, AUTO (.00): 0 %
OHS LV EJECTION FRACTION SIMPSONS BIPLANE MOD: 24 %
PISA TR MAX VEL: 2.99 M/S
PLATELET # BLD AUTO: 236 K/UL (ref 150–400)
PLATELET MORPHOLOGY: ABNORMAL
POTASSIUM SERPL-SCNC: 5 MMOL/L (ref 3.5–5.1)
PV PEAK GRADIENT: 3 MMHG
PV PEAK VELOCITY: 0.82 M/S
RA PRESSURE ESTIMATED: 15 MMHG
RBC # BLD AUTO: 4.16 M/UL (ref 4.2–5.4)
RBC MORPH BLD: NORMAL
RIGHT ATRIAL AREA: 22 CM2
RIGHT VENTRICULAR END-DIASTOLIC DIMENSION: 3.5 CM
RIGHT VENTRICULAR LENGTH IN DIASTOLE (APICAL 4-CHAMBER VIEW): 6.45 CM
RV MID DIAMA: 2.27 CM
RV TB RVSP: 18 MMHG
SODIUM SERPL-SCNC: 134 MMOL/L (ref 136–145)
TR MAX PG: 36 MMHG
TRICUSPID ANNULAR PLANE SYSTOLIC EXCURSION: 1.49 CM
TROPONIN I SERPL DL<=0.01 NG/ML-MCNC: 65.4 PG/ML
TV REST PULMONARY ARTERY PRESSURE: 51 MMHG
WBC # BLD AUTO: 7.24 K/UL (ref 4.5–11)
Z-SCORE OF LEFT VENTRICULAR DIMENSION IN END DIASTOLE: -1.4
Z-SCORE OF LEFT VENTRICULAR DIMENSION IN END SYSTOLE: 1.33

## 2024-01-15 PROCEDURE — 94761 N-INVAS EAR/PLS OXIMETRY MLT: CPT

## 2024-01-15 PROCEDURE — 83735 ASSAY OF MAGNESIUM: CPT | Performed by: STUDENT IN AN ORGANIZED HEALTH CARE EDUCATION/TRAINING PROGRAM

## 2024-01-15 PROCEDURE — 20000000 HC ICU ROOM

## 2024-01-15 PROCEDURE — 25000003 PHARM REV CODE 250: Performed by: STUDENT IN AN ORGANIZED HEALTH CARE EDUCATION/TRAINING PROGRAM

## 2024-01-15 PROCEDURE — 84484 ASSAY OF TROPONIN QUANT: CPT | Performed by: STUDENT IN AN ORGANIZED HEALTH CARE EDUCATION/TRAINING PROGRAM

## 2024-01-15 PROCEDURE — 82962 GLUCOSE BLOOD TEST: CPT

## 2024-01-15 PROCEDURE — 63600175 PHARM REV CODE 636 W HCPCS: Performed by: STUDENT IN AN ORGANIZED HEALTH CARE EDUCATION/TRAINING PROGRAM

## 2024-01-15 PROCEDURE — 99900035 HC TECH TIME PER 15 MIN (STAT)

## 2024-01-15 PROCEDURE — 99900031 HC PATIENT EDUCATION (STAT)

## 2024-01-15 PROCEDURE — 80048 BASIC METABOLIC PNL TOTAL CA: CPT | Performed by: STUDENT IN AN ORGANIZED HEALTH CARE EDUCATION/TRAINING PROGRAM

## 2024-01-15 PROCEDURE — 85025 COMPLETE CBC W/AUTO DIFF WBC: CPT | Performed by: STUDENT IN AN ORGANIZED HEALTH CARE EDUCATION/TRAINING PROGRAM

## 2024-01-15 PROCEDURE — 27000221 HC OXYGEN, UP TO 24 HOURS

## 2024-01-15 PROCEDURE — 99291 CRITICAL CARE FIRST HOUR: CPT | Mod: ,,, | Performed by: STUDENT IN AN ORGANIZED HEALTH CARE EDUCATION/TRAINING PROGRAM

## 2024-01-15 PROCEDURE — 94660 CPAP INITIATION&MGMT: CPT

## 2024-01-15 RX ORDER — HEPARIN SODIUM 5000 [USP'U]/ML
5000 INJECTION, SOLUTION INTRAVENOUS; SUBCUTANEOUS EVERY 8 HOURS
Status: DISCONTINUED | OUTPATIENT
Start: 2024-01-15 | End: 2024-01-16

## 2024-01-15 RX ORDER — MUPIROCIN 20 MG/G
OINTMENT TOPICAL 2 TIMES DAILY
Status: COMPLETED | OUTPATIENT
Start: 2024-01-15 | End: 2024-01-19

## 2024-01-15 RX ADMIN — HEPARIN SODIUM 5000 UNITS: 5000 INJECTION, SOLUTION INTRAVENOUS; SUBCUTANEOUS at 09:01

## 2024-01-15 RX ADMIN — AMIODARONE HYDROCHLORIDE 0.5 MG/MIN: 1.8 INJECTION, SOLUTION INTRAVENOUS at 10:01

## 2024-01-15 RX ADMIN — MUPIROCIN: 20 OINTMENT TOPICAL at 09:01

## 2024-01-15 RX ADMIN — AZITHROMYCIN DIHYDRATE 500 MG: 500 INJECTION, POWDER, LYOPHILIZED, FOR SOLUTION INTRAVENOUS at 03:01

## 2024-01-15 RX ADMIN — CEFTRIAXONE SODIUM 2 G: 2 INJECTION, POWDER, FOR SOLUTION INTRAMUSCULAR; INTRAVENOUS at 03:01

## 2024-01-15 RX ADMIN — HEPARIN SODIUM 5000 UNITS: 5000 INJECTION, SOLUTION INTRAVENOUS; SUBCUTANEOUS at 02:01

## 2024-01-15 RX ADMIN — MUPIROCIN: 20 OINTMENT TOPICAL at 10:01

## 2024-01-15 RX ADMIN — AMIODARONE HYDROCHLORIDE 0.5 MG/MIN: 1.8 INJECTION, SOLUTION INTRAVENOUS at 11:01

## 2024-01-15 NOTE — ASSESSMENT & PLAN NOTE
AFib with RVR on presentation.  Management thus far has included digoxin and amiodarone push.  We will start amiodarone drip with bolus. Will consider anticoagulation and hold on initiation of heparin gtt pending discussion of long term anticoagulation with patient.   - cont Amiodarone gtt

## 2024-01-15 NOTE — ASSESSMENT & PLAN NOTE
Presenting with hypertensive urgency characterize with respiratory distress and arrhythmia.  CTA chest with no evidence of acute aortic pathology.  No renal dysfunction on BNP.  No active chest pain following initiation of nitroglycerin drip in ED  Yesterday.  Now gradually down titrating her nitroglycerin drip       - SBP goal less than 140; we will continue nitroglycerin drip to goal  - avoiding CCB and BB for overlapping acute heart failure

## 2024-01-15 NOTE — ASSESSMENT & PLAN NOTE
Presenting with hypertensive urgency characterize with respiratory distress and arrhythmia.  CTA chest with no evidence of acute aortic pathology.  No renal dysfunction on BNP.  No active chest pain following initiation of nitroglycerin drip in ED. we will manage hypertensive urgency with nitro drip per Cardiology recommendations.  - SBP goal less than 140; we will continue nitroglycerin drip to goal  - avoiding CCB and BB for overlapping acute heart failure

## 2024-01-15 NOTE — ASSESSMENT & PLAN NOTE
Tn flat x2 in this patient presenting with hypertensive emergency with flash pulmonary edema and AFib with RVR.  Clinical picture at time of admission is consistent with demand ischemia.  Management as above.  Holding on heparin drip.  EKG personally reviewed with no ST deviations and non concerning for type 1 MI.

## 2024-01-15 NOTE — ASSESSMENT & PLAN NOTE
Presenting in acute respiratory distress recovering upon initiation of NIV in this patient with acute heart failure exacerbation with flash pulmonary edema.  ABG reviewed.  Improved work of breathing while on NIV.    - cont BPAP 15/8; volumes appropriate  - titrate FiO2 for goal SpO2 >92%

## 2024-01-15 NOTE — SUBJECTIVE & OBJECTIVE
No past medical history on file.    No past surgical history on file.    Review of patient's allergies indicates:   Allergen Reactions    Aspirin      Patient denies any rash or airway issue with aspirin       Family History    None       Tobacco Use    Smoking status: Not on file    Smokeless tobacco: Not on file   Substance and Sexual Activity    Alcohol use: Not on file    Drug use: Not on file    Sexual activity: Not on file      Review of Systems   All other systems reviewed and are negative.    Objective:     Vital Signs (Most Recent):  Pulse: (!) 139 (01/14/24 1754)  Resp: (!) 26 (01/14/24 1754)  BP: (!) 154/94 (01/14/24 1754)  SpO2: 96 % (01/14/24 1754) Vital Signs (24h Range):  Temp:  [97.7 °F (36.5 °C)] 97.7 °F (36.5 °C)  Pulse:  [109-154] 139  Resp:  [26-44] 26  SpO2:  [85 %-97 %] 96 %  BP: (102-214)/() 154/94   Weight: 85.7 kg (189 lb)  Body mass index is 30.51 kg/m².    No intake or output data in the 24 hours ending 01/14/24 1806       Physical Exam  Constitutional:       General: She is in acute distress.      Appearance: Normal appearance.      Comments: Seated upright with bed at 90°   HENT:      Head: Normocephalic and atraumatic.      Mouth/Throat:      Comments: NIV mask secured with no leak  Eyes:      General: No scleral icterus.  Cardiovascular:      Rate and Rhythm: Tachycardia present. Rhythm irregular.      Heart sounds: Murmur (Grade 2/6 systolic) heard.   Pulmonary:      Effort: Respiratory distress present.      Breath sounds: Wheezing and rales present. No rhonchi.   Abdominal:      Palpations: Abdomen is soft.      Tenderness: There is no abdominal tenderness. There is no guarding.   Musculoskeletal:      Cervical back: Normal range of motion.      Right lower leg: No edema.      Left lower leg: No edema.   Skin:     General: Skin is warm.      Capillary Refill: Capillary refill takes less than 2 seconds.      Coloration: Skin is not jaundiced.   Neurological:      General: No  focal deficit present.      Mental Status: She is alert. Mental status is at baseline.            Vents:  Oxygen Concentration (%): 50 (01/14/24 1554)  Lines/Drains/Airways       Drain  Duration                  Urethral Catheter 01/14/24 1441 18 Fr. <1 day              Peripheral Intravenous Line  Duration                  Peripheral IV - Single Lumen 01/14/24 1341 20 G Anterior;Right Forearm <1 day         Peripheral IV - Single Lumen 01/14/24 1800 20 G Anterior;Left Forearm <1 day                  Significant Labs:    CBC/Anemia Profile:  Recent Labs   Lab 01/14/24  1351   WBC 7.90   HGB 14.7   HCT 46.0      MCV 96.2*   RDW 13.4        Chemistries:  Recent Labs   Lab 01/14/24  1351   *   K 4.9   CL 99   CO2 28   BUN 25*   CREATININE 0.92   CALCIUM 9.4   ALBUMIN 3.6   PROT 8.0   BILITOT 0.5   ALKPHOS 82   ALT 73*   AST 43*   MG 2.0       ABGs:   Recent Labs   Lab 01/14/24  1444   PH 7.38   PCO2 46   HCO3 27.2   POCSATURATED 89     All pertinent labs within the past 24 hours have been reviewed.    Significant Imaging: I have reviewed all pertinent imaging results/findings within the past 24 hours.

## 2024-01-15 NOTE — ASSESSMENT & PLAN NOTE
Presenting in acute respiratory distress recovering upon initiation of NIV in this patient with acute heart failure exacerbation with flash pulmonary edema.  ABG reviewed.  Improved work of breathing while on NIV.    -   Now nasal cannula, saturating well  - titrate FiO2 for goal SpO2 >92%  - cont CTX and Azithromycin; f/u PCT and de-escalate as indicated ( likely de-escalation tomorrow if continues to remain clinically stable)

## 2024-01-15 NOTE — PLAN OF CARE
Problem: Adult Inpatient Plan of Care  Goal: Plan of Care Review  Outcome: Ongoing, Progressing  Goal: Patient-Specific Goal (Individualized)  Outcome: Ongoing, Progressing  Goal: Absence of Hospital-Acquired Illness or Injury  Outcome: Ongoing, Progressing  Goal: Optimal Comfort and Wellbeing  Outcome: Ongoing, Progressing  Goal: Readiness for Transition of Care  Outcome: Ongoing, Progressing     Problem: Infection  Goal: Absence of Infection Signs and Symptoms  Outcome: Ongoing, Progressing     Problem: Noninvasive Ventilation Acute  Goal: Effective Unassisted Ventilation and Oxygenation  Outcome: Ongoing, Progressing     Problem: Fall Injury Risk  Goal: Absence of Fall and Fall-Related Injury  Outcome: Ongoing, Progressing

## 2024-01-15 NOTE — PLAN OF CARE
Problem: Adult Inpatient Plan of Care  Goal: Plan of Care Review  Outcome: Ongoing, Progressing     Problem: Noninvasive Ventilation Acute  Goal: Effective Unassisted Ventilation and Oxygenation  Outcome: Ongoing, Progressing

## 2024-01-15 NOTE — HOSPITAL COURSE
1/15/24-came off BiPAP therapy and tolerated well on nasal cannula.  1/16/24-off nitro drip overnight, doing well on amiodarone.  Echocardiogram with reduced ejection fraction and severe mitral regurgitation  1/17/24-continued on diuresis, stable on nasal cannula, did not require positive airway pressure during the day today.  1/18/24- stable in terms of atrial fibrillation  1/19- oxygenating well; HR controlled with current regimen; BP has been stable; will transfer to floor when bed available.

## 2024-01-15 NOTE — PROGRESS NOTES
[facility]  Critical Care Medicine  Progress Note    Patient Name: Windy Swann  MRN: [id number]  Admission Date: 1/14/2024  Hospital Length of Stay: 1 days  Code Status: DNR  Attending Provider: Ila Parra MD  Primary Care Provider: Alyssa, Primary Doctor   Principal Problem: <principal problem not specified>    Subjective:     HPI:  89 yo F with no significant medical history, albeit she has not established with any care, presented via OSH with complaints of 3 day history of progressive shortness of breath associated with chest discomfort and found to be in AFib with RVR with transfer in-house and thereafter admission to the ICU for management of severe respiratory distress and AFib with RVR.      On presentation to outside hospital she was noted to be in acute respiratory distress we will with tachypnea, hypoxia with SpO2 85% and found to be in AFib with RVR rates to 150s with no hypotension. While at outside hospital she was started on non-rebreather with course notable for progression in her respiratory distress with plan for transfer in-house for higher level of care.  While there, she received management including diltiazem 5 mg (dose interrupted due to concern for heart failure), digoxin 500 mcg, Lasix 20 mg IV, ticagrelor load 180 mg with transfer thereafter to our ED. on presentation here, she was noted to be in respiratory distress following CTA chest and was initiated on BiPAP.  At time of my assessment, she reports having improvement in her dyspnea with persistent tachypnea.  She is able to speak in full sentences and prefers to communicate by writing down on paper.    At end of my assessment, HR 140s in irregular rhythm, on NIV with SpO2 greater than 95%, BP 150s to 160s over 90s to 110s.  Labs are notable for unremarkable CBC, creatinine 0.92, mild transaminitis with AST/ALT 43/73, NT pro BNP 52408, free T4 within normal limits at 1.05 and troponin flat x2 (104, 109).  ABG reviewed  with the P/F of 142.  Imaging with CTA chest notable for compressive atelectasis versus consolidation in right lower lobe and bilateral right greater than left pleural effusion, LA and RA dilation and no PE.  Cardiology was consulted during ED course with initiation of a nitroglycerin drip for management of hypertensive urgency.    Hospital/ICU Course:    1/15/24-came off BiPAP therapy and tolerated well on nasal cannula.    Interval History/Significant Events:  no chest pain, did well status post removal of bilevel positive airway pressure.  Now stable on nasal cannula.    Review of Systems  Objective:     Vital Signs (Most Recent):  Temp: 98.4 °F (36.9 °C) (01/15/24 1515)  Pulse: (!) 111 (01/15/24 1530)  Resp: (!) 24 (01/15/24 1530)  BP: 123/73 (01/15/24 1515)  SpO2: 95 % (01/15/24 1530) Vital Signs (24h Range):  Temp:  [97.2 °F (36.2 °C)-98.5 °F (36.9 °C)] 98.4 °F (36.9 °C)  Pulse:  [] 111  Resp:  [14-45] 24  SpO2:  [89 %-98 %] 95 %  BP: ()/() 123/73   Weight: 70.3 kg (155 lb)  Body mass index is 25.02 kg/m².      Intake/Output Summary (Last 24 hours) at 1/15/2024 1619  Last data filed at 1/15/2024 1349  Gross per 24 hour   Intake 800.43 ml   Output 2800 ml   Net -1999.57 ml          Physical Exam  Constitutional:       Appearance: Normal appearance.      Comments: Seated upright with bed at 90°   HENT:      Head: Normocephalic and atraumatic.      Mouth/Throat:      Comments: NIV mask secured with no leak  Eyes:      General: No scleral icterus.  Cardiovascular:      Rate and Rhythm: Tachycardia present. Rhythm irregular.      Heart sounds: Murmur (Grade 2/6 systolic) heard.   Pulmonary:      Breath sounds: Rales present. No rhonchi.   Abdominal:      Palpations: Abdomen is soft.      Tenderness: There is no abdominal tenderness. There is no guarding.   Musculoskeletal:      Cervical back: Normal range of motion.      Right lower leg: No edema.      Left lower leg: No edema.   Skin:      General: Skin is warm.      Capillary Refill: Capillary refill takes less than 2 seconds.      Coloration: Skin is not jaundiced.   Neurological:      General: No focal deficit present.      Mental Status: She is alert. Mental status is at baseline.            Vents:  Oxygen Concentration (%): 32 (01/15/24 0951)  Lines/Drains/Airways       Drain  Duration                  Urethral Catheter 01/14/24 1441 18 Fr. 1 day              Peripheral Intravenous Line  Duration                  Peripheral IV - Single Lumen 01/14/24 1341 20 G Anterior;Right Forearm 1 day         Peripheral IV - Single Lumen 01/14/24 1800 20 G Anterior;Left Forearm <1 day                  Significant Labs:    CBC/Anemia Profile:  Recent Labs   Lab 01/14/24  1351 01/15/24  0451   WBC 7.90 7.24   HGB 14.7 12.8   HCT 46.0 39.2    236   MCV 96.2* 94.2   RDW 13.4 12.8        Chemistries:  Recent Labs   Lab 01/14/24  1351 01/14/24  1810 01/15/24  0451   * 134* 134*   K 4.9 5.2* 5.0   CL 99 100 100   CO2 28 28 27   BUN 25* 27* 25*   CREATININE 0.92 0.92 0.97   CALCIUM 9.4 9.9 9.4   ALBUMIN 3.6  --   --    PROT 8.0  --   --    BILITOT 0.5  --   --    ALKPHOS 82  --   --    ALT 73*  --   --    AST 43*  --   --    MG 2.0 2.6* 2.6*       All pertinent labs within the past 24 hours have been reviewed.  Improvement in hypokalemia from 5.2-5.0.    Significant Imaging:  I have reviewed all pertinent imaging results/findings within the past 24 hours.    ABG  Recent Labs   Lab 01/14/24  1444   PH 7.38   PO2 57*   PCO2 46   HCO3 27.2     Assessment/Plan:     Pulmonary  Acute respiratory failure with hypoxia  Presenting in acute respiratory distress recovering upon initiation of NIV in this patient with acute heart failure exacerbation with flash pulmonary edema.  ABG reviewed.  Improved work of breathing while on NIV.    -   Now nasal cannula, saturating well  - titrate FiO2 for goal SpO2 >92%  - cont CTX and Azithromycin; f/u PCT and de-escalate as  indicated ( likely de-escalation tomorrow if continues to remain clinically stable)    Cardiac/Vascular  NSTEMI (non-ST elevated myocardial infarction)   Troponin down trended after being relatively flat, patient with no chest pain.  Did not require systemic anticoagulation with heparin drip.  Stated that she has had no chest pain.    Hypertensive urgency  Presenting with hypertensive urgency characterize with respiratory distress and arrhythmia.  CTA chest with no evidence of acute aortic pathology.  No renal dysfunction on BNP.  No active chest pain following initiation of nitroglycerin drip in ED  Yesterday.  Now gradually down titrating her nitroglycerin drip       - SBP goal less than 140; we will continue nitroglycerin drip to goal  - avoiding CCB and BB for overlapping acute heart failure    Atrial fibrillation with rapid ventricular response  AFib with RVR on presentation.  Management thus far has included digoxin and amiodarone push.  We will start amiodarone drip with bolus. Will consider anticoagulation and hold on initiation of heparin gtt pending discussion of long term anticoagulation with patient.   - cont Amiodarone gtt    Acute congestive heart failure  No prior known cardiac history presenting with evidence of decompensated heart failure with bilateral pleural effusion and respiratory distress. CT imaging with dilated RA and LA and no evidence of VTE including no clot in transit.  Initial management has included Lasix IV.  Plan for further management as follows:  - TTE E ordered, follow-up   - f/u on UOP following diuresis  - BMP and Magnesium now  - goal net -1-2L; will consider additional diuresis, although has had approximately 2.5 L of urine output since yesterday       Critical Care Daily Checklist:    A: Awake: RASS Goal/Actual Goal:    Actual:     B: Spontaneous Breathing Trial Performed?     C: SAT & SBT Coordinated?  Not applicable                      D: Delirium: CAM-ICU     E: Early  Mobility Performed? No   F: Feeding Goal:    Status:     Current Diet Order   Procedures    Diet Cardiac      AS: Analgesia/Sedation  Not applicable   T: Thromboembolic Prophylaxis  Heparin for DVT prophylaxis   H: HOB > 300 No   U: Stress Ulcer Prophylaxis (if needed)  Not indicated   G: Glucose Control  monitor   B: Bowel Function     I: Indwelling Catheter (Lines & Nelson) Necessity  appropriate   D: De-escalation of Antimicrobials/Pharmacotherapies  Not indicated    Plan for the day/ETD  As documented    Code Status:  Family/Goals of Care: DNR   Patient updated in detail by bedside     Critical Care Time:  35 minutes  Critical secondary to Patient has a condition that poses threat to life and bodily function:  atrial fibrillation with RVR, hypertensive urgency, acute hypoxic respiratory failure, flash pulmonary edema      Critical care was time spent personally by me on the following activities: development of treatment plan with patient or surrogate and bedside caregivers, discussions with consultants, evaluation of patient's response to treatment, examination of patient, ordering and performing treatments and interventions, ordering and review of laboratory studies, ordering and review of radiographic studies, pulse oximetry, re-evaluation of patient's condition. This critical care time did not overlap with that of any other provider or involve time for any procedures.     Lester Escobar MD  Critical Care Medicine  [facility] ICU

## 2024-01-15 NOTE — ASSESSMENT & PLAN NOTE
Troponin down trended after being relatively flat, patient with no chest pain.  Did not require systemic anticoagulation with heparin drip.  Stated that she has had no chest pain.

## 2024-01-15 NOTE — SUBJECTIVE & OBJECTIVE
Interval History/Significant Events:  no chest pain, did well status post removal of bilevel positive airway pressure.  Now stable on nasal cannula.    Review of Systems  Objective:     Vital Signs (Most Recent):  Temp: 98.4 °F (36.9 °C) (01/15/24 1515)  Pulse: (!) 111 (01/15/24 1530)  Resp: (!) 24 (01/15/24 1530)  BP: 123/73 (01/15/24 1515)  SpO2: 95 % (01/15/24 1530) Vital Signs (24h Range):  Temp:  [97.2 °F (36.2 °C)-98.5 °F (36.9 °C)] 98.4 °F (36.9 °C)  Pulse:  [] 111  Resp:  [14-45] 24  SpO2:  [89 %-98 %] 95 %  BP: ()/() 123/73   Weight: 70.3 kg (155 lb)  Body mass index is 25.02 kg/m².      Intake/Output Summary (Last 24 hours) at 1/15/2024 1619  Last data filed at 1/15/2024 1349  Gross per 24 hour   Intake 800.43 ml   Output 2800 ml   Net -1999.57 ml          Physical Exam  Constitutional:       Appearance: Normal appearance.      Comments: Seated upright with bed at 90°   HENT:      Head: Normocephalic and atraumatic.      Mouth/Throat:      Comments: NIV mask secured with no leak  Eyes:      General: No scleral icterus.  Cardiovascular:      Rate and Rhythm: Tachycardia present. Rhythm irregular.      Heart sounds: Murmur (Grade 2/6 systolic) heard.   Pulmonary:      Breath sounds: Rales present. No rhonchi.   Abdominal:      Palpations: Abdomen is soft.      Tenderness: There is no abdominal tenderness. There is no guarding.   Musculoskeletal:      Cervical back: Normal range of motion.      Right lower leg: No edema.      Left lower leg: No edema.   Skin:     General: Skin is warm.      Capillary Refill: Capillary refill takes less than 2 seconds.      Coloration: Skin is not jaundiced.   Neurological:      General: No focal deficit present.      Mental Status: She is alert. Mental status is at baseline.            Vents:  Oxygen Concentration (%): 32 (01/15/24 0951)  Lines/Drains/Airways       Drain  Duration                  Urethral Catheter 01/14/24 1442 18 Fr. 1 day               Peripheral Intravenous Line  Duration                  Peripheral IV - Single Lumen 01/14/24 1341 20 G Anterior;Right Forearm 1 day         Peripheral IV - Single Lumen 01/14/24 1800 20 G Anterior;Left Forearm <1 day                  Significant Labs:    CBC/Anemia Profile:  Recent Labs   Lab 01/14/24  1351 01/15/24  0451   WBC 7.90 7.24   HGB 14.7 12.8   HCT 46.0 39.2    236   MCV 96.2* 94.2   RDW 13.4 12.8        Chemistries:  Recent Labs   Lab 01/14/24  1351 01/14/24  1810 01/15/24  0451   * 134* 134*   K 4.9 5.2* 5.0   CL 99 100 100   CO2 28 28 27   BUN 25* 27* 25*   CREATININE 0.92 0.92 0.97   CALCIUM 9.4 9.9 9.4   ALBUMIN 3.6  --   --    PROT 8.0  --   --    BILITOT 0.5  --   --    ALKPHOS 82  --   --    ALT 73*  --   --    AST 43*  --   --    MG 2.0 2.6* 2.6*       All pertinent labs within the past 24 hours have been reviewed.  Improvement in hypokalemia from 5.2-5.0.    Significant Imaging:  I have reviewed all pertinent imaging results/findings within the past 24 hours.

## 2024-01-15 NOTE — ASSESSMENT & PLAN NOTE
No prior known cardiac history presenting with evidence of decompensated heart failure with bilateral pleural effusion and respiratory distress. CT imaging with dilated RA and LA and no evidence of VTE including no clot in transit.  Initial management has included Lasix IV.  Plan for further management as follows:  - TTE E ordered, follow-up   - f/u on UOP following diuresis  - BMP and Magnesium now  - goal net -1-2L; will consider additional diuresis

## 2024-01-15 NOTE — H&P
[facility] - Emergency Department  Critical Care Medicine  History & Physical    Patient Name: Windy Swann  MRN: [id number]  Admission Date: 1/14/2024  Hospital Length of Stay: 0 days  Code Status: DNR  Attending Physician: Ila Parra MD   Primary Care Provider: No, Primary Doctor   Principal Problem: <principal problem not specified>    Subjective:     HPI:  89 yo F with no significant medical history, albeit she has not established with any care, presented via OSH with complaints of 3 day history of progressive shortness of breath associated with chest discomfort and found to be in AFib with RVR with transfer in-house and thereafter admission to the ICU for management of severe respiratory distress and AFib with RVR.      On presentation to outside hospital she was noted to be in acute respiratory distress we will with tachypnea, hypoxia with SpO2 85% and found to be in AFib with RVR rates to 150s with no hypotension. While at outside hospital she was started on non-rebreather with course notable for progression in her respiratory distress with plan for transfer in-house for higher level of care.  While there, she received management including diltiazem 5 mg (dose interrupted due to concern for heart failure), digoxin 500 mcg, Lasix 20 mg IV, ticagrelor load 180 mg with transfer thereafter to our ED. on presentation here, she was noted to be in respiratory distress following CTA chest and was initiated on BiPAP.  At time of my assessment, she reports having improvement in her dyspnea with persistent tachypnea.  She is able to speak in full sentences and prefers to communicate by writing down on paper.    At end of my assessment, HR 140s in irregular rhythm, on NIV with SpO2 greater than 95%, BP 150s to 160s over 90s to 110s.  Labs are notable for unremarkable CBC, creatinine 0.92, mild transaminitis with AST/ALT 43/73, NT pro BNP 18787, free T4 within normal limits at 1.05 and troponin flat x2 (104,  109).  ABG reviewed with the P/F of 142.  Imaging with CTA chest notable for compressive atelectasis versus consolidation in right lower lobe and bilateral right greater than left pleural effusion, LA and RA dilation and no PE.  Cardiology was consulted during ED course with initiation of a nitroglycerin drip for management of hypertensive urgency.    Hospital/ICU Course:  No notes on file     No past medical history on file.    No past surgical history on file.    Review of patient's allergies indicates:   Allergen Reactions    Aspirin      Patient denies any rash or airway issue with aspirin       Family History    None       Tobacco Use    Smoking status: Not on file    Smokeless tobacco: Not on file   Substance and Sexual Activity    Alcohol use: Not on file    Drug use: Not on file    Sexual activity: Not on file      Review of Systems   All other systems reviewed and are negative.    Objective:     Vital Signs (Most Recent):  Pulse: (!) 139 (01/14/24 1754)  Resp: (!) 26 (01/14/24 1754)  BP: (!) 154/94 (01/14/24 1754)  SpO2: 96 % (01/14/24 1754) Vital Signs (24h Range):  Temp:  [97.7 °F (36.5 °C)] 97.7 °F (36.5 °C)  Pulse:  [109-154] 139  Resp:  [26-44] 26  SpO2:  [85 %-97 %] 96 %  BP: (102-214)/() 154/94   Weight: 85.7 kg (189 lb)  Body mass index is 30.51 kg/m².    No intake or output data in the 24 hours ending 01/14/24 1806       Physical Exam  Constitutional:       General: She is in acute distress.      Appearance: Normal appearance.      Comments: Seated upright with bed at 90°   HENT:      Head: Normocephalic and atraumatic.      Mouth/Throat:      Comments: NIV mask secured with no leak  Eyes:      General: No scleral icterus.  Cardiovascular:      Rate and Rhythm: Tachycardia present. Rhythm irregular.      Heart sounds: Murmur (Grade 2/6 systolic) heard.   Pulmonary:      Effort: Respiratory distress present.      Breath sounds: Wheezing and rales present. No rhonchi.   Abdominal:       Palpations: Abdomen is soft.      Tenderness: There is no abdominal tenderness. There is no guarding.   Musculoskeletal:      Cervical back: Normal range of motion.      Right lower leg: No edema.      Left lower leg: No edema.   Skin:     General: Skin is warm.      Capillary Refill: Capillary refill takes less than 2 seconds.      Coloration: Skin is not jaundiced.   Neurological:      General: No focal deficit present.      Mental Status: She is alert. Mental status is at baseline.            Vents:  Oxygen Concentration (%): 50 (01/14/24 1554)  Lines/Drains/Airways       Drain  Duration                  Urethral Catheter 01/14/24 1441 18 Fr. <1 day              Peripheral Intravenous Line  Duration                  Peripheral IV - Single Lumen 01/14/24 1341 20 G Anterior;Right Forearm <1 day         Peripheral IV - Single Lumen 01/14/24 1800 20 G Anterior;Left Forearm <1 day                  Significant Labs:    CBC/Anemia Profile:  Recent Labs   Lab 01/14/24  1351   WBC 7.90   HGB 14.7   HCT 46.0      MCV 96.2*   RDW 13.4        Chemistries:  Recent Labs   Lab 01/14/24  1351   *   K 4.9   CL 99   CO2 28   BUN 25*   CREATININE 0.92   CALCIUM 9.4   ALBUMIN 3.6   PROT 8.0   BILITOT 0.5   ALKPHOS 82   ALT 73*   AST 43*   MG 2.0       ABGs:   Recent Labs   Lab 01/14/24  1444   PH 7.38   PCO2 46   HCO3 27.2   POCSATURATED 89     All pertinent labs within the past 24 hours have been reviewed.    Significant Imaging: I have reviewed all pertinent imaging results/findings within the past 24 hours.  Assessment/Plan:     Pulmonary  Acute respiratory failure with hypoxia  Presenting in acute respiratory distress recovering upon initiation of NIV in this patient with acute heart failure exacerbation with flash pulmonary edema.  ABG reviewed.  Improved work of breathing while on NIV.    - cont BPAP 15/8; volumes appropriate  - titrate FiO2 for goal SpO2 >92%  - cont CTX and Azithromycin; f/u PCT and de-escalate  as indicated    Cardiac/Vascular  NSTEMI (non-ST elevated myocardial infarction)  Tn flat x2 in this patient presenting with hypertensive emergency with flash pulmonary edema and AFib with RVR.  Clinical picture at time of admission is consistent with demand ischemia.  Management as above.  Holding on heparin drip.  EKG personally reviewed with no ST deviations and non concerning for type 1 MI.    Hypertensive urgency  Presenting with hypertensive urgency characterize with respiratory distress and arrhythmia.  CTA chest with no evidence of acute aortic pathology.  No renal dysfunction on BNP.  No active chest pain following initiation of nitroglycerin drip in ED. we will manage hypertensive urgency with nitro drip per Cardiology recommendations.  - SBP goal less than 140; we will continue nitroglycerin drip to goal  - avoiding CCB and BB for overlapping acute heart failure    Atrial fibrillation with rapid ventricular response  AFib with RVR on presentation.  Management thus far has included digoxin and amiodarone push.  We will start amiodarone drip with bolus. Will consider anticoagulation and hold on initiation of heparin gtt pending discussion of long term anticoagulation with patient.   - cont Amiodarone gtt    Acute congestive heart failure  No prior known cardiac history presenting with evidence of decompensated heart failure with bilateral pleural effusion and respiratory distress. CT imaging with dilated RA and LA and no evidence of VTE including no clot in transit.  Initial management has included Lasix IV.  Plan for further management as follows:  - TTE E ordered, follow-up   - f/u on UOP following diuresis  - BMP and Magnesium now  - goal net -1-2L; will consider additional diuresis         Critical Care Medicine Daily Checklist:01/14/2024   F: Feeding NPO   A: Analgesia   N/A   S: Sedation CAM-ICU negative   N/A           T: Thromboprophylaxis Lower extremity SCD and On systemic anticoagulation with  heparin   H: HOB > 300 Yes.   U: Stress Ulcer Prophylaxis N/A   G: Glucose Control On ISS.   S: SAT & SBT Coordinated?  N/A   B: Bowel Regimen Bowel regimen ordered.   I: Indwelling Catheter Reviewed Maintain: N/A  Remove: N/A   D: De-escalation of Antimicrobials No    Disposition ICU        Critical Care Time: 40 minutes  Critical secondary to Patient has a condition that poses threat to life and bodily function: Severe Respiratory Distress    Critical care was time spent personally by me on the following activities: development of treatment plan with patient or surrogate and bedside caregivers, discussions with consultants, evaluation of patient's response to treatment, examination of patient, ordering and performing treatments and interventions, ordering and review of laboratory studies, ordering and review of radiographic studies, pulse oximetry, re-evaluation of patient's condition. This critical care time did not overlap with that of any other provider or involve time for any procedures.     Ila Prara MD  Critical Care Medicine  [facility] - Emergency Department

## 2024-01-16 PROBLEM — I34.0 SEVERE MITRAL REGURGITATION: Status: ACTIVE | Noted: 2024-01-16

## 2024-01-16 PROBLEM — I21.A1 TYPE 2 MI (MYOCARDIAL INFARCTION): Status: ACTIVE | Noted: 2024-01-14

## 2024-01-16 LAB
ANION GAP SERPL CALCULATED.3IONS-SCNC: 9 MMOL/L (ref 7–16)
BASOPHILS # BLD AUTO: 0.08 K/UL (ref 0–0.2)
BASOPHILS NFR BLD AUTO: 1 % (ref 0–1)
BUN SERPL-MCNC: 21 MG/DL (ref 7–18)
BUN/CREAT SERPL: 24 (ref 6–20)
CALCIUM SERPL-MCNC: 9.4 MG/DL (ref 8.5–10.1)
CHLORIDE SERPL-SCNC: 99 MMOL/L (ref 98–107)
CHOLEST SERPL-MCNC: 163 MG/DL (ref 0–200)
CHOLEST/HDLC SERPL: 2.9 {RATIO}
CO2 SERPL-SCNC: 27 MMOL/L (ref 21–32)
CREAT SERPL-MCNC: 0.88 MG/DL (ref 0.55–1.02)
DIFFERENTIAL METHOD BLD: ABNORMAL
EGFR (NO RACE VARIABLE) (RUSH/TITUS): 63 ML/MIN/1.73M2
EOSINOPHIL # BLD AUTO: 0.1 K/UL (ref 0–0.5)
EOSINOPHIL NFR BLD AUTO: 1.3 % (ref 1–4)
ERYTHROCYTE [DISTWIDTH] IN BLOOD BY AUTOMATED COUNT: 12.8 % (ref 11.5–14.5)
GLUCOSE SERPL-MCNC: 106 MG/DL (ref 74–106)
GLUCOSE SERPL-MCNC: 108 MG/DL (ref 70–105)
GLUCOSE SERPL-MCNC: 124 MG/DL (ref 70–105)
GLUCOSE SERPL-MCNC: 142 MG/DL (ref 70–105)
GLUCOSE SERPL-MCNC: 176 MG/DL (ref 70–105)
GLUCOSE SERPL-MCNC: 98 MG/DL (ref 70–105)
HCT VFR BLD AUTO: 42.9 % (ref 38–47)
HDLC SERPL-MCNC: 56 MG/DL (ref 40–60)
HGB BLD-MCNC: 14 G/DL (ref 12–16)
IMM GRANULOCYTES # BLD AUTO: 0.04 K/UL (ref 0–0.04)
IMM GRANULOCYTES NFR BLD: 0.5 % (ref 0–0.4)
LACTATE SERPL-SCNC: 1.1 MMOL/L (ref 0.4–2)
LDLC SERPL CALC-MCNC: 95 MG/DL
LDLC/HDLC SERPL: 1.7 {RATIO}
LYMPHOCYTES # BLD AUTO: 1.64 K/UL (ref 1–4.8)
LYMPHOCYTES NFR BLD AUTO: 20.6 % (ref 27–41)
MAGNESIUM SERPL-MCNC: 2.7 MG/DL (ref 1.7–2.3)
MCH RBC QN AUTO: 30.1 PG (ref 27–31)
MCHC RBC AUTO-ENTMCNC: 32.6 G/DL (ref 32–36)
MCV RBC AUTO: 92.3 FL (ref 80–96)
MONOCYTES # BLD AUTO: 0.61 K/UL (ref 0–0.8)
MONOCYTES NFR BLD AUTO: 7.6 % (ref 2–6)
MPC BLD CALC-MCNC: 10.2 FL (ref 9.4–12.4)
NEUTROPHILS # BLD AUTO: 5.51 K/UL (ref 1.8–7.7)
NEUTROPHILS NFR BLD AUTO: 69 % (ref 53–65)
NONHDLC SERPL-MCNC: 107 MG/DL
NRBC # BLD AUTO: 0 X10E3/UL
NRBC, AUTO (.00): 0 %
PLATELET # BLD AUTO: 251 K/UL (ref 150–400)
POTASSIUM SERPL-SCNC: 4.5 MMOL/L (ref 3.5–5.1)
RBC # BLD AUTO: 4.65 M/UL (ref 4.2–5.4)
SODIUM SERPL-SCNC: 130 MMOL/L (ref 136–145)
TRIGL SERPL-MCNC: 62 MG/DL (ref 35–150)
VLDLC SERPL-MCNC: 12 MG/DL
WBC # BLD AUTO: 7.98 K/UL (ref 4.5–11)

## 2024-01-16 PROCEDURE — 99291 CRITICAL CARE FIRST HOUR: CPT | Mod: ,,, | Performed by: STUDENT IN AN ORGANIZED HEALTH CARE EDUCATION/TRAINING PROGRAM

## 2024-01-16 PROCEDURE — 25000003 PHARM REV CODE 250: Performed by: STUDENT IN AN ORGANIZED HEALTH CARE EDUCATION/TRAINING PROGRAM

## 2024-01-16 PROCEDURE — 80061 LIPID PANEL: CPT | Performed by: NURSE PRACTITIONER

## 2024-01-16 PROCEDURE — 83735 ASSAY OF MAGNESIUM: CPT | Performed by: STUDENT IN AN ORGANIZED HEALTH CARE EDUCATION/TRAINING PROGRAM

## 2024-01-16 PROCEDURE — 63600175 PHARM REV CODE 636 W HCPCS: Performed by: STUDENT IN AN ORGANIZED HEALTH CARE EDUCATION/TRAINING PROGRAM

## 2024-01-16 PROCEDURE — 80048 BASIC METABOLIC PNL TOTAL CA: CPT | Performed by: STUDENT IN AN ORGANIZED HEALTH CARE EDUCATION/TRAINING PROGRAM

## 2024-01-16 PROCEDURE — 99223 1ST HOSP IP/OBS HIGH 75: CPT | Mod: ,,, | Performed by: STUDENT IN AN ORGANIZED HEALTH CARE EDUCATION/TRAINING PROGRAM

## 2024-01-16 PROCEDURE — 20000000 HC ICU ROOM

## 2024-01-16 PROCEDURE — 63600175 PHARM REV CODE 636 W HCPCS: Performed by: NURSE PRACTITIONER

## 2024-01-16 PROCEDURE — 27000221 HC OXYGEN, UP TO 24 HOURS

## 2024-01-16 PROCEDURE — 25000003 PHARM REV CODE 250: Performed by: NURSE PRACTITIONER

## 2024-01-16 PROCEDURE — 82962 GLUCOSE BLOOD TEST: CPT

## 2024-01-16 PROCEDURE — 99900035 HC TECH TIME PER 15 MIN (STAT)

## 2024-01-16 PROCEDURE — 85025 COMPLETE CBC W/AUTO DIFF WBC: CPT | Performed by: STUDENT IN AN ORGANIZED HEALTH CARE EDUCATION/TRAINING PROGRAM

## 2024-01-16 PROCEDURE — 83605 ASSAY OF LACTIC ACID: CPT | Performed by: STUDENT IN AN ORGANIZED HEALTH CARE EDUCATION/TRAINING PROGRAM

## 2024-01-16 RX ORDER — ENOXAPARIN SODIUM 100 MG/ML
1 INJECTION SUBCUTANEOUS EVERY 12 HOURS
Status: DISCONTINUED | OUTPATIENT
Start: 2024-01-16 | End: 2024-01-21

## 2024-01-16 RX ORDER — FUROSEMIDE 10 MG/ML
40 INJECTION INTRAMUSCULAR; INTRAVENOUS DAILY
Status: DISCONTINUED | OUTPATIENT
Start: 2024-01-17 | End: 2024-01-22

## 2024-01-16 RX ORDER — LOSARTAN POTASSIUM 25 MG/1
25 TABLET ORAL DAILY
Status: DISCONTINUED | OUTPATIENT
Start: 2024-01-16 | End: 2024-01-17

## 2024-01-16 RX ORDER — METOPROLOL SUCCINATE 25 MG/1
25 TABLET, EXTENDED RELEASE ORAL DAILY
Status: DISCONTINUED | OUTPATIENT
Start: 2024-01-16 | End: 2024-01-17

## 2024-01-16 RX ORDER — FUROSEMIDE 10 MG/ML
40 INJECTION INTRAMUSCULAR; INTRAVENOUS ONCE
Status: COMPLETED | OUTPATIENT
Start: 2024-01-16 | End: 2024-01-16

## 2024-01-16 RX ORDER — ASPIRIN 81 MG/1
81 TABLET ORAL DAILY
Status: DISCONTINUED | OUTPATIENT
Start: 2024-01-17 | End: 2024-01-23 | Stop reason: HOSPADM

## 2024-01-16 RX ORDER — ATORVASTATIN CALCIUM 20 MG/1
20 TABLET, FILM COATED ORAL NIGHTLY
Status: DISCONTINUED | OUTPATIENT
Start: 2024-01-16 | End: 2024-01-23 | Stop reason: HOSPADM

## 2024-01-16 RX ORDER — ENOXAPARIN SODIUM 100 MG/ML
40 INJECTION SUBCUTANEOUS EVERY 24 HOURS
Status: DISCONTINUED | OUTPATIENT
Start: 2024-01-16 | End: 2024-01-16

## 2024-01-16 RX ADMIN — MUPIROCIN: 20 OINTMENT TOPICAL at 08:01

## 2024-01-16 RX ADMIN — AZITHROMYCIN DIHYDRATE 500 MG: 500 INJECTION, POWDER, LYOPHILIZED, FOR SOLUTION INTRAVENOUS at 04:01

## 2024-01-16 RX ADMIN — ATORVASTATIN CALCIUM 20 MG: 20 TABLET, FILM COATED ORAL at 08:01

## 2024-01-16 RX ADMIN — CEFTRIAXONE SODIUM 2 G: 2 INJECTION, POWDER, FOR SOLUTION INTRAMUSCULAR; INTRAVENOUS at 04:01

## 2024-01-16 RX ADMIN — ENOXAPARIN SODIUM 70 MG: 80 INJECTION SUBCUTANEOUS at 05:01

## 2024-01-16 RX ADMIN — HEPARIN SODIUM 5000 UNITS: 5000 INJECTION, SOLUTION INTRAVENOUS; SUBCUTANEOUS at 05:01

## 2024-01-16 RX ADMIN — FUROSEMIDE 40 MG: 10 INJECTION, SOLUTION INTRAVENOUS at 11:01

## 2024-01-16 RX ADMIN — AMIODARONE HYDROCHLORIDE 0.5 MG/MIN: 1.8 INJECTION, SOLUTION INTRAVENOUS at 10:01

## 2024-01-16 RX ADMIN — AMIODARONE HYDROCHLORIDE 0.5 MG/MIN: 1.8 INJECTION, SOLUTION INTRAVENOUS at 09:01

## 2024-01-16 RX ADMIN — METOPROLOL SUCCINATE 25 MG: 25 TABLET, EXTENDED RELEASE ORAL at 06:01

## 2024-01-16 NOTE — SUBJECTIVE & OBJECTIVE
No past medical history on file.    No past surgical history on file.    Review of patient's allergies indicates:   Allergen Reactions    Aspirin      Patient denies any rash or airway issue with aspirin       No current facility-administered medications on file prior to encounter.     Current Outpatient Medications on File Prior to Encounter   Medication Sig    mv-min/iron/folic/calcium/vitK (WOMEN'S MULTIVITAMIN ORAL) Take 1 tablet by mouth once daily.    naproxen sodium (ALEVE) 220 MG tablet Take 220 mg by mouth 2 (two) times daily with meals.     Family History    None       Tobacco Use    Smoking status: Not on file    Smokeless tobacco: Not on file   Substance and Sexual Activity    Alcohol use: Not on file    Drug use: Not on file    Sexual activity: Not on file     Review of Systems   Constitutional: Positive for malaise/fatigue. Negative for chills and fever.   HENT: Negative.     Eyes: Negative.    Cardiovascular:  Positive for dyspnea on exertion, leg swelling, orthopnea and palpitations. Negative for chest pain.   Respiratory:  Positive for shortness of breath and sleep disturbances due to breathing.    Gastrointestinal:  Negative for abdominal pain, nausea and vomiting.   Genitourinary: Negative.    Neurological:  Positive for weakness.     Objective:     Vital Signs (Most Recent):  Temp: 97.8 °F (36.6 °C) (01/16/24 1124)  Pulse: 93 (01/16/24 1215)  Resp: (!) 24 (01/16/24 1215)  BP: 120/69 (01/16/24 1200)  SpO2: (!) 90 % (01/16/24 1215) Vital Signs (24h Range):  Temp:  [97.8 °F (36.6 °C)-98.7 °F (37.1 °C)] 97.8 °F (36.6 °C)  Pulse:  [] 93  Resp:  [19-42] 24  SpO2:  [90 %-96 %] 90 %  BP: (106-149)/() 120/69     Weight: 70.3 kg (155 lb)  Body mass index is 25.02 kg/m².    SpO2: (!) 90 %         Intake/Output Summary (Last 24 hours) at 1/16/2024 1442  Last data filed at 1/16/2024 0533  Gross per 24 hour   Intake 992.2 ml   Output 400 ml   Net 592.2 ml       Lines/Drains/Airways       Drain   "Duration                  Urethral Catheter 01/14/24 1441 18 Fr. 2 days              Peripheral Intravenous Line  Duration                  Peripheral IV - Single Lumen 01/14/24 1341 20 G Anterior;Right Forearm 2 days         Peripheral IV - Single Lumen 01/14/24 1800 20 G Anterior;Left Forearm 1 day                     Physical Exam  Vitals reviewed.   Constitutional:       General: She is not in acute distress.  HENT:      Mouth/Throat:      Mouth: Mucous membranes are moist.      Pharynx: Oropharynx is clear.   Eyes:      General: No scleral icterus.     Pupils: Pupils are equal, round, and reactive to light.   Cardiovascular:      Rate and Rhythm: Tachycardia present. Rhythm irregular.      Heart sounds: Murmur heard.   Pulmonary:      Effort: Pulmonary effort is normal.      Breath sounds: Rales present. No wheezing or rhonchi.   Abdominal:      General: Bowel sounds are normal.      Palpations: Abdomen is soft.   Musculoskeletal:      Right lower leg: Edema present.      Left lower leg: Edema present.   Skin:     General: Skin is cool and dry.      Coloration: Skin is mottled.   Neurological:      Mental Status: She is alert and oriented to person, place, and time.          Significant Labs: ABG: No results for input(s): "PH", "PCO2", "HCO3", "POCSATURATED", "BE" in the last 48 hours., Blood Culture: No results for input(s): "LABBLOO" in the last 48 hours., BMP:   Recent Labs   Lab 01/14/24  1810 01/15/24  0451 01/16/24 0315   * 108* 106   * 134* 130*   K 5.2* 5.0 4.5    100 99   CO2 28 27 27   BUN 27* 25* 21*   CREATININE 0.92 0.97 0.88   CALCIUM 9.9 9.4 9.4   MG 2.6* 2.6* 2.7*   , CMP   Recent Labs   Lab 01/14/24  1810 01/15/24  0451 01/16/24  0315   * 134* 130*   K 5.2* 5.0 4.5    100 99   CO2 28 27 27   * 108* 106   BUN 27* 25* 21*   CREATININE 0.92 0.97 0.88   CALCIUM 9.9 9.4 9.4   ANIONGAP 11 12 9   , CBC   Recent Labs   Lab 01/15/24  0451 01/16/24  0315   WBC 7.24 " "7.98   HGB 12.8 14.0   HCT 39.2 42.9    251   , INR No results for input(s): "INR", "PROTIME" in the last 48 hours., Lipid Panel No results for input(s): "CHOL", "HDL", "LDLCALC", "TRIG", "CHOLHDL" in the last 48 hours., and Troponin No results for input(s): "TROPONINI" in the last 48 hours.    Significant Imaging: Echocardiogram: Transthoracic echo (TTE) complete (Cupid Only):   Results for orders placed or performed during the hospital encounter of 01/14/24   Echo   Result Value Ref Range    BSA 1.81 m2    LVOT stroke volume 62.42 cm3    LVIDd 4.30 3.5 - 6.0 cm    LV Systolic Volume 55.08 mL    LV Systolic Volume Index 30.8 mL/m2    LVIDs 3.62 2.1 - 4.0 cm    LV Diastolic Volume 82.92 mL    LV Diastolic Volume Index 46.32 mL/m2    IVS 1.48 (A) 0.6 - 1.1 cm    LVOT diameter 1.96 cm    LVOT area 3.0 cm2    FS 16 (A) 28 - 44 %    Left Ventricle Relative Wall Thickness 0.38 cm    Posterior Wall 0.82 0.6 - 1.1 cm    LV mass 173.65 g    LV Mass Index 97 g/m2    TR Max Florentino 2.99 m/s    LVOT peak florentino 0.98 m/s    Left Ventricular Outflow Tract Mean Velocity 0.58 cm/s    Left Ventricular Outflow Tract Mean Gradient 1.65 mmHg    Right ventricular length in diastole (apical 4-chamber view) 6.45 cm    RV mid diameter 2.27 cm    TAPSE 1.49 cm    LA size 4.8 cm    RA area 22.0 cm2    AV mean gradient 5 mmHg    AV peak gradient 8 mmHg    Ao peak florentino 1.44 m/s    Ao VTI 29.60 cm    LVOT peak VTI 20.70 cm    AV valve area 2.11 cm²    AV Velocity Ratio 0.68     AV index (prosthetic) 0.70     PRIYANK by Velocity Ratio 2.05 cm²    Triscuspid Valve Regurgitation Peak Gradient 36 mmHg    PV PEAK VELOCITY 0.82 m/s    PV peak gradient 3 mmHg    Ao root annulus 2.60 cm    IVC diameter 2.11 cm    ZLVIDS 1.33     ZLVIDD -1.40     RVDD 3.50 cm    TV resting pulmonary artery pressure 51 mmHg    RV TB RVSP 18 mmHg    Est. RA pres 15 mmHg    Guerrero's Biplane MOD Ejection Fraction 24 %    Narrative      Left Ventricle: The left ventricle is " normal in size. Normal wall   thickness. There is eccentric hypertrophy. Regional wall motion   abnormalities present. Anterior and amy-septal wall appears akinetic   There is severely reduced systolic function with a visually estimated   ejection fraction of 20 - 25%. Biplane (2D) method of discs ejection   fraction is 24%. Unable to assess diastolic function due to atrial   fibrillation.    Right Ventricle: Normal right ventricular cavity size. Systolic   function is normal.    Left Atrium: Left atrium is severely dilated.    Right Atrium: Right atrium is severely dilated.    Aortic Valve: The aortic valve is a trileaflet valve.    Mitral Valve: There is posterior leaflet sclerosis. There is annular   dilation present. There is severe regurgitation.    Tricuspid Valve: There is severe regurgitation.    Pulmonic Valve: There is mild regurgitation.    Pulmonary Artery: The estimated pulmonary artery systolic pressure is   51 mmHg.    IVC/SVC: Elevated venous pressure at 15 mmHg.    ECHO suggestive of restrictive cardiomyopathy with severe systolic   dysfunction and severe mitral regurgitation     , EKG:   Results for orders placed or performed during the hospital encounter of 01/14/24   EKG 12-lead    Collection Time: 01/14/24  2:10 PM    Narrative    Test Reason : R06.02,    Vent. Rate : 104 BPM     Atrial Rate : 043 BPM     P-R Int : 000 ms          QRS Dur : 114 ms      QT Int : 306 ms       P-R-T Axes : 000 -09 118 degrees     QTc Int : 402 ms    Atrial fibrillation with rapid ventricular response with premature  ventricular or aberrantly conducted complexes  Anteroseptal infarct (cited on or before 14-JAN-2024)    When compared with ECG of 14-JAN-2024 13:34,  Serial changes of Anteroseptal infarct Present  Confirmed by Rashawn MARTINEZ, Bianka HARVEY ([id number]) on 1/14/2024 5:32:32 PM    Referred By: [email or web address]   SELF           Confirmed By:Bianka Morocho MD      , and X-Ray: CXR: X-Ray Chest 1 View (CXR): X-Ray  Chest AP Portable  Order: [id number]  Status: Final result       Visible to patient: No (inaccessible in Patient Portal)       Next appt: None    0 Result Notes  Details    Reading Physician Reading Date Result Priority   Edwar Wilks DO  [phone] 1/16/2024 STAT     Narrative & Impression  EXAMINATION:  XR CHEST AP PORTABLE     CLINICAL HISTORY:  sob;     TECHNIQUE:  XR CHEST AP PORTABLE     COMPARISON:  01/15/2024     FINDINGS:  No lines or tubes.     Diffuse interstitial and patchy airspace opacities are similar     Bilateral pleural effusions are similar     Cardiac silhouette is similar to comparison exam.     No obvious acute bone findings.     Impression:     Diffuse interstitial and patchy airspace opacities are similar     Bilateral pleural effusions are similar        Electronically signed by: Edwar Wilks  Date:                                            01/16/2024  Time:                                           11:52

## 2024-01-16 NOTE — CONSULTS
[facility]  Cardiology  Consult Note    Patient Name: Windy Swann  MRN: [id number]  Admission Date: 1/14/2024  Hospital Length of Stay: 2 days  Code Status: DNR   Attending Provider: Lester Escobar MD   Consulting Provider: ALVARO Diehl  Primary Care Physician: Alyssa, Primary Doctor  Principal Problem:Acute congestive heart failure    Patient information was obtained from patient, ER records, and primary team.     Inpatient consult to Cardiology  Consult performed by: Juana Peña FNP  Consult ordered by: Lester Escobar MD  Reason for consult: atrial fibrillation with RVR, hypertensive urgency and suspected new onset heart failure with systolic dysfunction        Subjective:     Chief Complaint:  sob     HPI:   89 y/o female with no significant PMH (last seen by pcp a few years back before he retired) who presented to an outside hospital and was noted to be in acute respiratory distress we will with tachypnea, hypoxia with SpO2 85% and found to be in AFib with RVR rates to 150s with no hypotension. While at outside hospital she was started on non-rebreather with course notable for progression in her respiratory distress with plan for transfer in-house for higher level of care.  While there, she received management including diltiazem 5 mg (dose interrupted due to concern for heart failure), digoxin 500 mcg, Lasix 20 mg IV, ticagrelor load 180 mg and transferred to our ED. On presentation here, she was noted to be in respiratory distress following CTA chest and was initiated on BiPAP.      Labs are notable for unremarkable CBC, creatinine 0.92, mild transaminitis with AST/ALT 43/73, NT pro BNP 92702, free T4 within normal limits at 1.05 and troponin flat x2 (104, 109).  Imaging with CTA chest notable for compressive atelectasis versus consolidation in right lower lobe and bilateral right greater than left pleural effusion, LA and RA dilation and no PE.     1/16/2024:  Cardiology  consulted for atrial fibrillation with RVR, hypertensive urgency and suspected new onset heart failure with systolic dysfunction. Patient seen today, sitting up in chair, AAO x 3. States she lives at home alone in [place] and has no family close by, but does have a stepdaughter who lives in [place]. According to patient, she quit driving in December and has a friend who delivers her groceries. States she has still been cutting her grass with a riding  until it recently got cold. She appears to be a very functional 89 y/o until recently declining over the past month or so.     No past medical history on file.    No past surgical history on file.    Review of patient's allergies indicates:   Allergen Reactions    Aspirin      Patient denies any rash or airway issue with aspirin       No current facility-administered medications on file prior to encounter.     Current Outpatient Medications on File Prior to Encounter   Medication Sig    mv-min/iron/folic/calcium/vitK (WOMEN'S MULTIVITAMIN ORAL) Take 1 tablet by mouth once daily.    naproxen sodium (ALEVE) 220 MG tablet Take 220 mg by mouth 2 (two) times daily with meals.     Family History    None       Tobacco Use    Smoking status: Not on file    Smokeless tobacco: Not on file   Substance and Sexual Activity    Alcohol use: Not on file    Drug use: Not on file    Sexual activity: Not on file     Review of Systems   Constitutional: Positive for malaise/fatigue. Negative for chills and fever.   HENT: Negative.     Eyes: Negative.    Cardiovascular:  Positive for dyspnea on exertion, leg swelling, orthopnea and palpitations. Negative for chest pain.   Respiratory:  Positive for shortness of breath and sleep disturbances due to breathing.    Gastrointestinal:  Negative for abdominal pain, nausea and vomiting.   Genitourinary: Negative.    Neurological:  Positive for weakness.     Objective:     Vital Signs (Most Recent):  Temp: 97.8 °F (36.6 °C) (01/16/24  "1124)  Pulse: 93 (01/16/24 1215)  Resp: (!) 24 (01/16/24 1215)  BP: 120/69 (01/16/24 1200)  SpO2: (!) 90 % (01/16/24 1215) Vital Signs (24h Range):  Temp:  [97.8 °F (36.6 °C)-98.7 °F (37.1 °C)] 97.8 °F (36.6 °C)  Pulse:  [] 93  Resp:  [19-42] 24  SpO2:  [90 %-96 %] 90 %  BP: (106-149)/() 120/69     Weight: 70.3 kg (155 lb)  Body mass index is 25.02 kg/m².    SpO2: (!) 90 %         Intake/Output Summary (Last 24 hours) at 1/16/2024 1449  Last data filed at 1/16/2024 0533  Gross per 24 hour   Intake 992.2 ml   Output 400 ml   Net 592.2 ml       Lines/Drains/Airways       Drain  Duration                  Urethral Catheter 01/14/24 1441 18 Fr. 2 days              Peripheral Intravenous Line  Duration                  Peripheral IV - Single Lumen 01/14/24 1341 20 G Anterior;Right Forearm 2 days         Peripheral IV - Single Lumen 01/14/24 1800 20 G Anterior;Left Forearm 1 day                     Physical Exam  Vitals reviewed.   Constitutional:       General: She is not in acute distress.  HENT:      Mouth/Throat:      Mouth: Mucous membranes are moist.      Pharynx: Oropharynx is clear.   Eyes:      General: No scleral icterus.     Pupils: Pupils are equal, round, and reactive to light.   Cardiovascular:      Rate and Rhythm: Tachycardia present. Rhythm irregular.      Heart sounds: Murmur heard.   Pulmonary:      Effort: Pulmonary effort is normal.      Breath sounds: Rales present. No wheezing or rhonchi.   Abdominal:      General: Bowel sounds are normal.      Palpations: Abdomen is soft.   Musculoskeletal:      Right lower leg: Edema present.      Left lower leg: Edema present.   Skin:     General: Skin is cool and dry.      Coloration: Skin is mottled.   Neurological:      Mental Status: She is alert and oriented to person, place, and time.          Significant Labs: ABG: No results for input(s): "PH", "PCO2", "HCO3", "POCSATURATED", "BE" in the last 48 hours., Blood Culture: No results for input(s): " ""LABBLOO" in the last 48 hours., BMP:   Recent Labs   Lab 01/14/24  1810 01/15/24  0451 01/16/24  0315   * 108* 106   * 134* 130*   K 5.2* 5.0 4.5    100 99   CO2 28 27 27   BUN 27* 25* 21*   CREATININE 0.92 0.97 0.88   CALCIUM 9.9 9.4 9.4   MG 2.6* 2.6* 2.7*   , CMP   Recent Labs   Lab 01/14/24  1810 01/15/24  0451 01/16/24  0315   * 134* 130*   K 5.2* 5.0 4.5    100 99   CO2 28 27 27   * 108* 106   BUN 27* 25* 21*   CREATININE 0.92 0.97 0.88   CALCIUM 9.9 9.4 9.4   ANIONGAP 11 12 9   , CBC   Recent Labs   Lab 01/15/24  0451 01/16/24  0315   WBC 7.24 7.98   HGB 12.8 14.0   HCT 39.2 42.9    251   , INR No results for input(s): "INR", "PROTIME" in the last 48 hours., Lipid Panel No results for input(s): "CHOL", "HDL", "LDLCALC", "TRIG", "CHOLHDL" in the last 48 hours., and Troponin No results for input(s): "TROPONINI" in the last 48 hours.    Significant Imaging: Echocardiogram: Transthoracic echo (TTE) complete (Cupid Only):   Results for orders placed or performed during the hospital encounter of 01/14/24   Echo   Result Value Ref Range    BSA 1.81 m2    LVOT stroke volume 62.42 cm3    LVIDd 4.30 3.5 - 6.0 cm    LV Systolic Volume 55.08 mL    LV Systolic Volume Index 30.8 mL/m2    LVIDs 3.62 2.1 - 4.0 cm    LV Diastolic Volume 82.92 mL    LV Diastolic Volume Index 46.32 mL/m2    IVS 1.48 (A) 0.6 - 1.1 cm    LVOT diameter 1.96 cm    LVOT area 3.0 cm2    FS 16 (A) 28 - 44 %    Left Ventricle Relative Wall Thickness 0.38 cm    Posterior Wall 0.82 0.6 - 1.1 cm    LV mass 173.65 g    LV Mass Index 97 g/m2    TR Max Florentino 2.99 m/s    LVOT peak florentino 0.98 m/s    Left Ventricular Outflow Tract Mean Velocity 0.58 cm/s    Left Ventricular Outflow Tract Mean Gradient 1.65 mmHg    Right ventricular length in diastole (apical 4-chamber view) 6.45 cm    RV mid diameter 2.27 cm    TAPSE 1.49 cm    LA size 4.8 cm    RA area 22.0 cm2    AV mean gradient 5 mmHg    AV peak gradient 8 mmHg    " Ao peak hood 1.44 m/s    Ao VTI 29.60 cm    LVOT peak VTI 20.70 cm    AV valve area 2.11 cm²    AV Velocity Ratio 0.68     AV index (prosthetic) 0.70     PRIYANK by Velocity Ratio 2.05 cm²    Triscuspid Valve Regurgitation Peak Gradient 36 mmHg    PV PEAK VELOCITY 0.82 m/s    PV peak gradient 3 mmHg    Ao root annulus 2.60 cm    IVC diameter 2.11 cm    ZLVIDS 1.33     ZLVIDD -1.40     RVDD 3.50 cm    TV resting pulmonary artery pressure 51 mmHg    RV TB RVSP 18 mmHg    Est. RA pres 15 mmHg    Guerrero's Biplane MOD Ejection Fraction 24 %    Narrative      Left Ventricle: The left ventricle is normal in size. Normal wall   thickness. There is eccentric hypertrophy. Regional wall motion   abnormalities present. Anterior and amy-septal wall appears akinetic   There is severely reduced systolic function with a visually estimated   ejection fraction of 20 - 25%. Biplane (2D) method of discs ejection   fraction is 24%. Unable to assess diastolic function due to atrial   fibrillation.    Right Ventricle: Normal right ventricular cavity size. Systolic   function is normal.    Left Atrium: Left atrium is severely dilated.    Right Atrium: Right atrium is severely dilated.    Aortic Valve: The aortic valve is a trileaflet valve.    Mitral Valve: There is posterior leaflet sclerosis. There is annular   dilation present. There is severe regurgitation.    Tricuspid Valve: There is severe regurgitation.    Pulmonic Valve: There is mild regurgitation.    Pulmonary Artery: The estimated pulmonary artery systolic pressure is   51 mmHg.    IVC/SVC: Elevated venous pressure at 15 mmHg.    ECHO suggestive of restrictive cardiomyopathy with severe systolic   dysfunction and severe mitral regurgitation     , EKG:   Results for orders placed or performed during the hospital encounter of 01/14/24   EKG 12-lead    Collection Time: 01/14/24  2:10 PM    Narrative    Test Reason : R06.02,    Vent. Rate : 104 BPM     Atrial Rate : 043 BPM     P-R  Int : 000 ms          QRS Dur : 114 ms      QT Int : 306 ms       P-R-T Axes : 000 -09 118 degrees     QTc Int : 402 ms    Atrial fibrillation with rapid ventricular response with premature  ventricular or aberrantly conducted complexes  Anteroseptal infarct (cited on or before 14-JAN-2024)    When compared with ECG of 14-JAN-2024 13:34,  Serial changes of Anteroseptal infarct Present  Confirmed by Rashawn MARTINEZ, Bianka HARVEY ([id number]) on 1/14/2024 5:32:32 PM    Referred By: [email or web address]   SELF           Confirmed By:Bianka Morocho MD      , and X-Ray: CXR: X-Ray Chest 1 View (CXR): X-Ray Chest AP Portable  Order: [id number]  Status: Final result       Visible to patient: No (inaccessible in Patient Portal)       Next appt: None    0 Result Notes  Details    Reading Physician Reading Date Result Priority   Efe Edwar DO ARTURO  [phone] 1/16/2024 STAT     Narrative & Impression  EXAMINATION:  XR CHEST AP PORTABLE     CLINICAL HISTORY:  sob;     TECHNIQUE:  XR CHEST AP PORTABLE     COMPARISON:  01/15/2024     FINDINGS:  No lines or tubes.     Diffuse interstitial and patchy airspace opacities are similar     Bilateral pleural effusions are similar     Cardiac silhouette is similar to comparison exam.     No obvious acute bone findings.     Impression:     Diffuse interstitial and patchy airspace opacities are similar     Bilateral pleural effusions are similar        Electronically signed by: Edwar Wilks  Date:                                            01/16/2024  Time:                                           11:52     Assessment and Plan:     * Acute congestive heart failure  - Patient seen and evaluated by Dr. Escobar  - Echo with EF 25%, severe MR & TR, pulmonary hypertension, PASP 51mmHg; ECHO suggestive of restrictive cardiomyopathy with severe systolic dysfunction and severe mitral regurgitation  - Patient declines any invasive cardiac procedures at this time  - Continue medical management  - Start  Entresto 24-26 BID  - Lasix 40 IV x 1 dose today  - Continue monitoring      Severe mitral regurgitation  - Patient seen and evaluated by Dr. Escobar  - Severe MR & TR by echo  - Dr. Escobar had long discussion with patient at bedside and even though she is a very functional 89 y/o, at this time pt is not interested in any invasive procedures and wishes to medically manage  - Starting Entresto today for afterload reduction    Type 2 MI (myocardial infarction)  - Patient seen and evaluated by Dr. Escobar  - Troponin 104, 109, 65; EKG afib with RVR, IVCD, and PVCs  - Echo with EF 25% and severe MR & TR  - Likely Type 2 MI/demand ischemia in setting of acute heart failure, acute respiratory failure, and afib rvr  - Patient declines any invasive cardiac workup at this time  - Continue medical management; asa, statin, and lovenox 1mg/kg BID (plan to discharge on Eliquis if no contraindications)  - Holding off on BB for now, concern for low cardiac output    Hypertensive urgency  - Better controlled today, starting Entresto 24-26 BID for afterload reduction    Acute respiratory failure with hypoxia  - Being followed by primary medical team    Atrial fibrillation with rapid ventricular response  - Patient seen and evaluated by Dr. Escobar  - Rate better controlled on IV amiodarone, rate 90s-low 100s  - Holding off on BB for now, concern for low cardiac output  - Start lovenox 1mg/kg BID (plan to discharge home on Eliquis BID if no contraindications)  - Continue monitoring        VTE Risk Mitigation (From admission, onward)           Ordered     enoxaparin injection 70 mg  Every 12 hours         01/16/24 1525     IP VTE HIGH RISK PATIENT  Once         01/14/24 1751     Place sequential compression device  Until discontinued         01/14/24 1751                    Thank you for your consult. I will follow-up with patient. Please contact us if you have any additional questions.    Juana Peña, KATHYP  Cardiology   [facility]

## 2024-01-16 NOTE — ASSESSMENT & PLAN NOTE
- Patient seen and evaluated by Dr. Escobar  - Echo with EF 25%, severe MR & TR, pulmonary hypertension, PASP 51mmHg; ECHO suggestive of restrictive cardiomyopathy with severe systolic dysfunction and severe mitral regurgitation  - Patient declines any invasive cardiac procedures at this time  - Continue medical management  - Start Entresto 24-26 BID  - Lasix 40 IV x 1 dose today  - Continue monitoring

## 2024-01-16 NOTE — PLAN OF CARE
Problem: Noninvasive Ventilation Acute  Goal: Effective Unassisted Ventilation and Oxygenation  Outcome: Ongoing, Progressing     Problem: Gas Exchange Impaired  Goal: Optimal Gas Exchange  Outcome: Ongoing, Progressing

## 2024-01-16 NOTE — ASSESSMENT & PLAN NOTE
- Patient seen and evaluated by Dr. Escobar  - Troponin 104, 109, 65; EKG afib with RVR, IVCD, and PVCs  - Echo with EF 25% and severe MR & TR  - Likely Type 2 MI/demand ischemia in setting of acute heart failure, acute respiratory failure, and afib rvr  - Patient declines any invasive cardiac workup at this time  - Continue medical management; asa, statin, and lovenox 1mg/kg BID (plan to discharge on Eliquis if no contraindications)  - Holding off on BB for now, concern for low cardiac output

## 2024-01-16 NOTE — ASSESSMENT & PLAN NOTE
- Patient seen and evaluated by Dr. Escobar  - Severe MR & TR by echo  - Dr. Escobar had long discussion with patient at bedside and even though she is a very functional 89 y/o, at this time pt is not interested in any invasive procedures and wishes to medically manage  - Starting Entresto today for afterload reduction

## 2024-01-16 NOTE — ASSESSMENT & PLAN NOTE
- Patient seen and evaluated by Dr. Escobar  - Rate better controlled on IV amiodarone, rate 90s-low 100s  - Holding off on BB for now, concern for low cardiac output  - Start lovenox 1mg/kg BID (plan to discharge home on Eliquis BID if no contraindications)  - Continue monitoring

## 2024-01-16 NOTE — PLAN OF CARE
Problem: Adult Inpatient Plan of Care  Goal: Plan of Care Review  Outcome: Ongoing, Progressing  Goal: Patient-Specific Goal (Individualized)  Outcome: Ongoing, Progressing  Goal: Absence of Hospital-Acquired Illness or Injury  Outcome: Ongoing, Progressing  Intervention: Identify and Manage Fall Risk  Flowsheets (Taken 1/16/2024 0835)  Safety Promotion/Fall Prevention:   assistive device/personal item within reach   lighting adjusted   muscle strengthening facilitated   nonskid shoes/socks when out of bed   room near unit station   side rails raised x 3   supervised activity   toileting scheduled   Supervised toileting - stay within arms reach   instructed to call staff for mobility  Intervention: Prevent Skin Injury  Flowsheets (Taken 1/16/2024 0835)  Body Position: position changed independently  Skin Protection:   adhesive use limited   incontinence pads utilized   pulse oximeter probe site changed   silicone foam dressing in place   transparent dressing maintained   tubing/devices free from skin contact   skin-to-device areas padded   skin-to-skin areas padded  Intervention: Prevent and Manage VTE (Venous Thromboembolism) Risk  Flowsheets (Taken 1/16/2024 0835)  Activity Management:   Rolling - L1   Standing - L3   Up in chair - L3  VTE Prevention/Management:   remove, assess skin, and reapply sequential compression device   bleeding risk assessed   fluids promoted   ROM (active) performed  Range of Motion: active ROM (range of motion) encouraged  Intervention: Prevent Infection  Flowsheets (Taken 1/16/2024 0835)  Infection Prevention:   cohorting utilized   environmental surveillance performed   hand hygiene promoted   equipment surfaces disinfected   personal protective equipment utilized   rest/sleep promoted   single patient room provided  Goal: Optimal Comfort and Wellbeing  Outcome: Ongoing, Progressing  Intervention: Monitor Pain and Promote Comfort  Flowsheets (Taken 1/16/2024 0835)  Pain Management  Interventions:   medication offered   care clustered   pillow support provided   position adjusted   quiet environment facilitated   relaxation techniques promoted   warm blanket provided  Intervention: Provide Person-Centered Care  Flowsheets (Taken 1/16/2024 0835)  Trust Relationship/Rapport:   care explained   choices provided   emotional support provided   empathic listening provided   questions answered   questions encouraged   reassurance provided   thoughts/feelings acknowledged  Goal: Readiness for Transition of Care  Outcome: Ongoing, Progressing     Problem: Infection  Goal: Absence of Infection Signs and Symptoms  Outcome: Ongoing, Progressing  Intervention: Prevent or Manage Infection  Flowsheets (Taken 1/16/2024 0835)  Infection Management: aseptic technique maintained  Isolation Precautions: precautions maintained     Problem: Noninvasive Ventilation Acute  Goal: Effective Unassisted Ventilation and Oxygenation  Outcome: Ongoing, Progressing  Intervention: Monitor and Manage Noninvasive Ventilation  Flowsheets (Taken 1/16/2024 0835)  Airway/Ventilation Management:   calming measures promoted   pulmonary hygiene promoted  NPPV/CPAP Maintenance: nasal prongs adjusted     Problem: Fall Injury Risk  Goal: Absence of Fall and Fall-Related Injury  Outcome: Ongoing, Progressing  Intervention: Identify and Manage Contributors  Flowsheets (Taken 1/16/2024 0835)  Self-Care Promotion:   independence encouraged   BADL personal objects within reach   BADL personal routines maintained   meal set-up provided   safe use of adaptive equipment encouraged  Medication Review/Management: medications reviewed  Intervention: Promote Injury-Free Environment  Flowsheets (Taken 1/16/2024 0835)  Safety Promotion/Fall Prevention:   assistive device/personal item within reach   lighting adjusted   muscle strengthening facilitated   nonskid shoes/socks when out of bed   room near unit station   side rails raised x 3   supervised  activity   toileting scheduled   Supervised toileting - stay within arms reach   instructed to call staff for mobility     Problem: Gas Exchange Impaired  Goal: Optimal Gas Exchange  Outcome: Ongoing, Progressing  Intervention: Optimize Oxygenation and Ventilation  Flowsheets (Taken 1/16/2024 6997)  Airway/Ventilation Management:   calming measures promoted   pulmonary hygiene promoted  Head of Bed (HOB) Positioning: HOB at 30-45 degrees

## 2024-01-16 NOTE — ADDENDUM NOTE
Encounter addended by: Miguel Peralta, RRT on: 1/16/2024 11:55 AM   Actions taken: Clinical Note Signed, Flowsheet accepted

## 2024-01-16 NOTE — HPI
89 y/o female with no significant PMH (last seen by pcp a few years back before he retired) who presented to an outside hospital and was noted to be in acute respiratory distress we will with tachypnea, hypoxia with SpO2 85% and found to be in AFib with RVR rates to 150s with no hypotension. While at outside hospital she was started on non-rebreather with course notable for progression in her respiratory distress with plan for transfer in-house for higher level of care.  While there, she received management including diltiazem 5 mg (dose interrupted due to concern for heart failure), digoxin 500 mcg, Lasix 20 mg IV, ticagrelor load 180 mg and transferred to our ED. On presentation here, she was noted to be in respiratory distress following CTA chest and was initiated on BiPAP.      Labs are notable for unremarkable CBC, creatinine 0.92, mild transaminitis with AST/ALT 43/73, NT pro BNP 68293, free T4 within normal limits at 1.05 and troponin flat x2 (104, 109).  Imaging with CTA chest notable for compressive atelectasis versus consolidation in right lower lobe and bilateral right greater than left pleural effusion, LA and RA dilation and no PE.     1/16/2024:  Cardiology consulted for atrial fibrillation with RVR, hypertensive urgency and suspected new onset heart failure with systolic dysfunction. Patient seen today, sitting up in chair, AAO x 3. States she lives at home alone in [place] and has no family close by, but does have a stepdaughter who lives in [place]. According to patient, she quit driving in December and has a friend who delivers her groceries. States she has still been cutting her grass with a riding  until it recently got cold. She appears to be a very functional 89 y/o until recently declining over the past month or so.

## 2024-01-16 NOTE — PLAN OF CARE
Problem: Infection  Goal: Absence of Infection Signs and Symptoms  Outcome: Ongoing, Progressing  Intervention: Prevent or Manage Infection  Flowsheets (Taken 1/15/2024 1928)  Fever Reduction/Comfort Measures:   lightweight bedding   lightweight clothing  Infection Management: aseptic technique maintained  Isolation Precautions: precautions initiated     Problem: Fall Injury Risk  Goal: Absence of Fall and Fall-Related Injury  Outcome: Ongoing, Progressing  Intervention: Identify and Manage Contributors  Flowsheets (Taken 1/15/2024 1928)  Self-Care Promotion: independence encouraged  Medication Review/Management:   medications reviewed   high-risk medications identified  Intervention: Promote Injury-Free Environment  Flowsheets (Taken 1/15/2024 1928)  Safety Promotion/Fall Prevention:   assistive device/personal item within reach   bed alarm set   side rails raised x 2   instructed to call staff for mobility   Fall Risk signage in place   Fall Risk reviewed with patient/family

## 2024-01-17 LAB
ALBUMIN PE, BLOOD: 3.73 G/DL (ref 3.5–5.2)
ALPHA1 GLOB SERPL ELPH-MCNC: 0.1 G/DL (ref 0.1–0.4)
ALPHA2 GLOB SERPL ELPH-MCNC: 0.9 G/DL (ref 0.4–1.3)
ANION GAP SERPL CALCULATED.3IONS-SCNC: 4 MMOL/L (ref 7–16)
B-GLOBULIN SERPL ELPH-MCNC: 0.7 G/DL (ref 0.5–1.5)
BASOPHILS # BLD AUTO: 0.07 K/UL (ref 0–0.2)
BASOPHILS NFR BLD AUTO: 1 % (ref 0–1)
BUN SERPL-MCNC: 22 MG/DL (ref 7–18)
BUN/CREAT SERPL: 26 (ref 6–20)
CALCIUM SERPL-MCNC: 9.3 MG/DL (ref 8.5–10.1)
CHLORIDE SERPL-SCNC: 97 MMOL/L (ref 98–107)
CO2 SERPL-SCNC: 33 MMOL/L (ref 21–32)
CREAT SERPL-MCNC: 0.84 MG/DL (ref 0.55–1.02)
DIFFERENTIAL METHOD BLD: ABNORMAL
EGFR (NO RACE VARIABLE) (RUSH/TITUS): 66 ML/MIN/1.73M2
EOSINOPHIL # BLD AUTO: 0.12 K/UL (ref 0–0.5)
EOSINOPHIL NFR BLD AUTO: 1.7 % (ref 1–4)
ERYTHROCYTE [DISTWIDTH] IN BLOOD BY AUTOMATED COUNT: 12.7 % (ref 11.5–14.5)
GAMMA GLOB SERPL ELPH-MCNC: 0.8 G/DL (ref 0.5–1.8)
GLUCOSE SERPL-MCNC: 114 MG/DL (ref 74–106)
GLUCOSE SERPL-MCNC: 122 MG/DL (ref 70–105)
GLUCOSE SERPL-MCNC: 156 MG/DL (ref 70–105)
HCT VFR BLD AUTO: 39.5 % (ref 38–47)
HGB BLD-MCNC: 13 G/DL (ref 12–16)
IGA UR QL IFE: NORMAL
IGG UR QL IFE: NORMAL
IGM UR QL IFE: NORMAL
IMM GRANULOCYTES # BLD AUTO: 0.02 K/UL (ref 0–0.04)
IMM GRANULOCYTES NFR BLD: 0.3 % (ref 0–0.4)
KAPPA LC UR QL IFE: NORMAL
LAMBDA IFE, URINE: NORMAL
LYMPHOCYTES # BLD AUTO: 1.19 K/UL (ref 1–4.8)
LYMPHOCYTES NFR BLD AUTO: 16.4 % (ref 27–41)
MAGNESIUM SERPL-MCNC: 2.3 MG/DL (ref 1.7–2.3)
MCH RBC QN AUTO: 30.2 PG (ref 27–31)
MCHC RBC AUTO-ENTMCNC: 32.9 G/DL (ref 32–36)
MCV RBC AUTO: 91.6 FL (ref 80–96)
MONOCYTES # BLD AUTO: 0.58 K/UL (ref 0–0.8)
MONOCYTES NFR BLD AUTO: 8 % (ref 2–6)
MPC BLD CALC-MCNC: 10.2 FL (ref 9.4–12.4)
NEUTROPHILS # BLD AUTO: 5.27 K/UL (ref 1.8–7.7)
NEUTROPHILS NFR BLD AUTO: 72.6 % (ref 53–65)
NRBC # BLD AUTO: 0 X10E3/UL
NRBC, AUTO (.00): 0 %
PATH INTERP BLD-IMP: ABNORMAL
PATHOLOGIST INTERP, IFE, URINE: NORMAL
PLATELET # BLD AUTO: 256 K/UL (ref 150–400)
POTASSIUM SERPL-SCNC: 4.4 MMOL/L (ref 3.5–5.1)
PROT SERPL-MCNC: 6.3 G/DL (ref 6.4–8.2)
RBC # BLD AUTO: 4.31 M/UL (ref 4.2–5.4)
SODIUM SERPL-SCNC: 130 MMOL/L (ref 136–145)
UA COMPLETE W REFLEX CULTURE PNL UR: ABNORMAL
WBC # BLD AUTO: 7.25 K/UL (ref 4.5–11)

## 2024-01-17 PROCEDURE — 86335 IMMUNFIX E-PHORSIS/URINE/CSF: CPT | Performed by: STUDENT IN AN ORGANIZED HEALTH CARE EDUCATION/TRAINING PROGRAM

## 2024-01-17 PROCEDURE — 99900035 HC TECH TIME PER 15 MIN (STAT)

## 2024-01-17 PROCEDURE — 85025 COMPLETE CBC W/AUTO DIFF WBC: CPT | Performed by: STUDENT IN AN ORGANIZED HEALTH CARE EDUCATION/TRAINING PROGRAM

## 2024-01-17 PROCEDURE — 84165 PROTEIN E-PHORESIS SERUM: CPT | Performed by: STUDENT IN AN ORGANIZED HEALTH CARE EDUCATION/TRAINING PROGRAM

## 2024-01-17 PROCEDURE — 25000003 PHARM REV CODE 250: Performed by: NURSE PRACTITIONER

## 2024-01-17 PROCEDURE — 82962 GLUCOSE BLOOD TEST: CPT

## 2024-01-17 PROCEDURE — 25000003 PHARM REV CODE 250: Performed by: INTERNAL MEDICINE

## 2024-01-17 PROCEDURE — 86335 IMMUNFIX E-PHORSIS/URINE/CSF: CPT | Mod: 26,,, | Performed by: PATHOLOGY

## 2024-01-17 PROCEDURE — 27000221 HC OXYGEN, UP TO 24 HOURS

## 2024-01-17 PROCEDURE — 80048 BASIC METABOLIC PNL TOTAL CA: CPT | Performed by: STUDENT IN AN ORGANIZED HEALTH CARE EDUCATION/TRAINING PROGRAM

## 2024-01-17 PROCEDURE — 11000001 HC ACUTE MED/SURG PRIVATE ROOM

## 2024-01-17 PROCEDURE — 83521 IG LIGHT CHAINS FREE EACH: CPT | Mod: 90 | Performed by: STUDENT IN AN ORGANIZED HEALTH CARE EDUCATION/TRAINING PROGRAM

## 2024-01-17 PROCEDURE — 84165 PROTEIN E-PHORESIS SERUM: CPT | Mod: 26,,, | Performed by: PATHOLOGY

## 2024-01-17 PROCEDURE — 83735 ASSAY OF MAGNESIUM: CPT | Performed by: STUDENT IN AN ORGANIZED HEALTH CARE EDUCATION/TRAINING PROGRAM

## 2024-01-17 PROCEDURE — 63600175 PHARM REV CODE 636 W HCPCS: Performed by: NURSE PRACTITIONER

## 2024-01-17 PROCEDURE — 94761 N-INVAS EAR/PLS OXIMETRY MLT: CPT

## 2024-01-17 PROCEDURE — 99233 SBSQ HOSP IP/OBS HIGH 50: CPT | Mod: ,,, | Performed by: STUDENT IN AN ORGANIZED HEALTH CARE EDUCATION/TRAINING PROGRAM

## 2024-01-17 PROCEDURE — 63600175 PHARM REV CODE 636 W HCPCS: Performed by: STUDENT IN AN ORGANIZED HEALTH CARE EDUCATION/TRAINING PROGRAM

## 2024-01-17 PROCEDURE — 25000003 PHARM REV CODE 250: Performed by: STUDENT IN AN ORGANIZED HEALTH CARE EDUCATION/TRAINING PROGRAM

## 2024-01-17 RX ORDER — LOSARTAN POTASSIUM 25 MG/1
25 TABLET ORAL ONCE
Status: COMPLETED | OUTPATIENT
Start: 2024-01-17 | End: 2024-01-17

## 2024-01-17 RX ORDER — AMIODARONE HYDROCHLORIDE 200 MG/1
400 TABLET ORAL 2 TIMES DAILY
Status: DISCONTINUED | OUTPATIENT
Start: 2024-01-17 | End: 2024-01-23 | Stop reason: HOSPADM

## 2024-01-17 RX ORDER — METOPROLOL SUCCINATE 50 MG/1
50 TABLET, EXTENDED RELEASE ORAL DAILY
Status: DISCONTINUED | OUTPATIENT
Start: 2024-01-18 | End: 2024-01-20

## 2024-01-17 RX ORDER — AMIODARONE HYDROCHLORIDE 200 MG/1
200 TABLET ORAL DAILY
Status: DISCONTINUED | OUTPATIENT
Start: 2024-01-24 | End: 2024-01-23 | Stop reason: HOSPADM

## 2024-01-17 RX ORDER — LOSARTAN POTASSIUM 50 MG/1
50 TABLET ORAL DAILY
Status: DISCONTINUED | OUTPATIENT
Start: 2024-01-18 | End: 2024-01-23 | Stop reason: HOSPADM

## 2024-01-17 RX ORDER — METOPROLOL SUCCINATE 25 MG/1
25 TABLET, EXTENDED RELEASE ORAL ONCE
Status: COMPLETED | OUTPATIENT
Start: 2024-01-17 | End: 2024-01-17

## 2024-01-17 RX ADMIN — METOPROLOL SUCCINATE 25 MG: 25 TABLET, FILM COATED, EXTENDED RELEASE ORAL at 09:01

## 2024-01-17 RX ADMIN — ATORVASTATIN CALCIUM 20 MG: 20 TABLET, FILM COATED ORAL at 09:01

## 2024-01-17 RX ADMIN — TRAZODONE HYDROCHLORIDE 25 MG: 50 TABLET ORAL at 10:01

## 2024-01-17 RX ADMIN — ENOXAPARIN SODIUM 70 MG: 80 INJECTION SUBCUTANEOUS at 08:01

## 2024-01-17 RX ADMIN — LOSARTAN POTASSIUM 25 MG: 25 TABLET, FILM COATED ORAL at 04:01

## 2024-01-17 RX ADMIN — ENOXAPARIN SODIUM 70 MG: 80 INJECTION SUBCUTANEOUS at 09:01

## 2024-01-17 RX ADMIN — ASPIRIN 81 MG: 81 TABLET, COATED ORAL at 08:01

## 2024-01-17 RX ADMIN — METOPROLOL SUCCINATE 25 MG: 25 TABLET, EXTENDED RELEASE ORAL at 08:01

## 2024-01-17 RX ADMIN — AMIODARONE HYDROCHLORIDE 400 MG: 200 TABLET ORAL at 04:01

## 2024-01-17 RX ADMIN — AMIODARONE HYDROCHLORIDE 0.5 MG/MIN: 1.8 INJECTION, SOLUTION INTRAVENOUS at 10:01

## 2024-01-17 RX ADMIN — FUROSEMIDE 40 MG: 10 INJECTION, SOLUTION INTRAVENOUS at 08:01

## 2024-01-17 RX ADMIN — LOSARTAN POTASSIUM 25 MG: 25 TABLET, FILM COATED ORAL at 08:01

## 2024-01-17 RX ADMIN — MUPIROCIN: 20 OINTMENT TOPICAL at 09:01

## 2024-01-17 RX ADMIN — ONDANSETRON 4 MG: 2 INJECTION INTRAMUSCULAR; INTRAVENOUS at 10:01

## 2024-01-17 RX ADMIN — MUPIROCIN: 20 OINTMENT TOPICAL at 08:01

## 2024-01-17 NOTE — PROGRESS NOTES
[facility]  Critical Care Medicine  Progress Note    Patient Name: Windy Swann  MRN: [id number]  Admission Date: 1/14/2024  Hospital Length of Stay: 3 days  Code Status: DNR  Attending Provider: Lester Escobar MD  Primary Care Provider: Alyssa, Primary Doctor   Principal Problem: Acute congestive heart failure    Subjective:     HPI:  89 yo F with no significant medical history, albeit she has not established with any care, presented via OSH with complaints of 3 day history of progressive shortness of breath associated with chest discomfort and found to be in AFib with RVR with transfer in-house and thereafter admission to the ICU for management of severe respiratory distress and AFib with RVR.      On presentation to outside hospital she was noted to be in acute respiratory distress we will with tachypnea, hypoxia with SpO2 85% and found to be in AFib with RVR rates to 150s with no hypotension. While at outside hospital she was started on non-rebreather with course notable for progression in her respiratory distress with plan for transfer in-house for higher level of care.  While there, she received management including diltiazem 5 mg (dose interrupted due to concern for heart failure), digoxin 500 mcg, Lasix 20 mg IV, ticagrelor load 180 mg with transfer thereafter to our ED. on presentation here, she was noted to be in respiratory distress following CTA chest and was initiated on BiPAP.  At time of my assessment, she reports having improvement in her dyspnea with persistent tachypnea.  She is able to speak in full sentences and prefers to communicate by writing down on paper.    At end of my assessment, HR 140s in irregular rhythm, on NIV with SpO2 greater than 95%, BP 150s to 160s over 90s to 110s.  Labs are notable for unremarkable CBC, creatinine 0.92, mild transaminitis with AST/ALT 43/73, NT pro BNP 71716, free T4 within normal limits at 1.05 and troponin flat x2 (104, 109).  ABG reviewed  with the P/F of 142.  Imaging with CTA chest notable for compressive atelectasis versus consolidation in right lower lobe and bilateral right greater than left pleural effusion, LA and RA dilation and no PE.  Cardiology was consulted during ED course with initiation of a nitroglycerin drip for management of hypertensive urgency.    Hospital/ICU Course:    1/15/24-came off BiPAP therapy and tolerated well on nasal cannula.  1/16/24-off nitro drip overnight, doing well on amiodarone.  Echocardiogram with reduced ejection fraction and severe mitral regurgitation  1/17/24-continued on diuresis, stable on nasal cannula, did not require positive airway pressure during the day today.    Interval History/Significant Events:  No significant events overnight.    Review of Systems  Objective:     Vital Signs (Most Recent):  Temp: 98 °F (36.7 °C) (01/17/24 1145)  Pulse: 104 (01/17/24 1430)  Resp: (!) 21 (01/17/24 1430)  BP: 128/73 (01/17/24 1621)  SpO2: (!) 93 % (01/17/24 1430) Vital Signs (24h Range):  Temp:  [97.4 °F (36.3 °C)-98.1 °F (36.7 °C)] 98 °F (36.7 °C)  Pulse:  [] 104  Resp:  [17-42] 21  SpO2:  [81 %-98 %] 93 %  BP: (100-137)/(54-92) 128/73   Weight: 76.1 kg (167 lb 11.2 oz)  Body mass index is 27.07 kg/m².      Intake/Output Summary (Last 24 hours) at 1/17/2024 1632  Last data filed at 1/17/2024 1400  Gross per 24 hour   Intake 922.26 ml   Output 2625 ml   Net -1702.74 ml          Physical Exam  Constitutional:       Appearance: Normal appearance.      Comments: Seated upright with bed at 90°   HENT:      Head: Normocephalic and atraumatic.   Eyes:      General: No scleral icterus.  Cardiovascular:      Rate and Rhythm: Tachycardia present. Rhythm irregular.      Heart sounds: Murmur (Grade 2/6 systolic) heard.   Pulmonary:      Breath sounds: Rales present. No rhonchi.   Abdominal:      Palpations: Abdomen is soft.      Tenderness: There is no abdominal tenderness. There is no guarding.   Musculoskeletal:       Cervical back: Normal range of motion.      Right lower leg: No edema.      Left lower leg: No edema.   Skin:     General: Skin is warm.      Capillary Refill: Capillary refill takes less than 2 seconds.      Coloration: Skin is not jaundiced.   Neurological:      General: No focal deficit present.      Mental Status: She is alert. Mental status is at baseline.            Vents:  Oxygen Concentration (%): 32 (01/16/24 0729)  Lines/Drains/Airways       Drain  Duration                  Urethral Catheter 01/14/24 1441 18 Fr. 3 days              Peripheral Intravenous Line  Duration                  Peripheral IV - Single Lumen 01/14/24 1800 20 G Anterior;Left Forearm 2 days         Peripheral IV - Single Lumen 01/16/24 1857 20 G Anterior;Distal;Right Upper Arm <1 day                  Significant Labs:    CBC/Anemia Profile:  Recent Labs   Lab 01/16/24  0315 01/17/24  0535   WBC 7.98 7.25   HGB 14.0 13.0   HCT 42.9 39.5    256   MCV 92.3 91.6   RDW 12.8 12.7        Chemistries:  Recent Labs   Lab 01/16/24  0315 01/17/24  0535   * 130*   K 4.5 4.4   CL 99 97*   CO2 27 33*   BUN 21* 22*   CREATININE 0.88 0.84   CALCIUM 9.4 9.3   PROT  --  6.3*   MG 2.7* 2.3       All pertinent labs within the past 24 hours have been reviewed.    Significant Imaging:  I have reviewed all pertinent imaging results/findings within the past 24 hours.    ABG  Recent Labs   Lab 01/14/24  1444   PH 7.38   PO2 57*   PCO2 46   HCO3 27.2     Assessment/Plan:     Pulmonary  Acute respiratory failure with hypoxia  Presenting in acute respiratory distress recovering upon initiation of NIV in this patient with acute heart failure exacerbation with flash pulmonary edema.  ABG reviewed.  Improved work of breathing while on NIV continued improvement in nasal cannula today.      .    -   Now nasal cannula, saturating well  - titrate FiO2 for goal SpO2 >92%      Cardiac/Vascular  * Acute congestive heart failure  No prior known cardiac history  presenting with evidence of decompensated heart failure with bilateral pleural effusion and respiratory distress. CT imaging with dilated RA and LA and no evidence of VTE including no clot in transit.  Initial management has included Lasix IV.  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  Medication optimization as per Cardiology recommendations.    Type 2 MI (myocardial infarction)   Troponin down trended after being relatively flat, patient with no chest pain.  Did not require systemic anticoagulation with heparin drip.  Stated that she has had no chest pain.    Hypertensive urgency  RESOLVED    Presenting with hypertensive urgency characterize with respiratory distress and arrhythmia.  CTA chest with no evidence of acute aortic pathology.  No renal dysfunction on BNP.  No active chest pain following initiation of nitroglycerin drip in ED  .  Off nitro drip.    Medication management as documented, can remain off nitroglycerin drip.    Atrial fibrillation with rapid ventricular response  AFib with RVR on presentation.  Management thus far has included digoxin and amiodarone push.      Continuing amiodarone drip, initiated on anticoagulation by Cardiology and we will continue at this time with close monitoring of her renal function.  Plan to  discharge on oral anticoagulation.      Stable for transfer to acute medical floor     Lester Escobar MD  Critical Care Medicine  [facility] ICU

## 2024-01-17 NOTE — PROGRESS NOTES
[facility] ICU  Cardiology  Progress Note    Patient Name: Windy Swann  MRN: [id number]  Admission Date: 1/14/2024  Hospital Length of Stay: 3 days  Code Status: DNR   Attending Physician: Lester Escobar MD   Primary Care Physician: Alyssa, Primary Doctor  Expected Discharge Date:   Principal Problem:Acute congestive heart failure    Subjective:     Hospital Course:   No notes on file    Interval History: Patient seen today by Dr. Escobar who discussed possible cardioversion if unable to control heart rate with activity. She also had a long detailed discussion regarding goals of care and patient's expectations, including home hospice as option.     Review of Systems   Constitutional: Positive for malaise/fatigue. Negative for chills and fever.   HENT: Negative.     Eyes: Negative.    Cardiovascular:  Positive for dyspnea on exertion, leg swelling, orthopnea and palpitations. Negative for chest pain.   Respiratory:  Positive for shortness of breath and sleep disturbances due to breathing.    Gastrointestinal:  Negative for abdominal pain, nausea and vomiting.   Genitourinary: Negative.    Neurological:  Positive for weakness.     Objective:     Vital Signs (Most Recent):  Temp: 98 °F (36.7 °C) (01/17/24 1145)  Pulse: 104 (01/17/24 1430)  Resp: (!) 21 (01/17/24 1430)  BP: 125/84 (01/17/24 1430)  SpO2: (!) 93 % (01/17/24 1430) Vital Signs (24h Range):  Temp:  [97.4 °F (36.3 °C)-98.1 °F (36.7 °C)] 98 °F (36.7 °C)  Pulse:  [] 104  Resp:  [17-42] 21  SpO2:  [81 %-98 %] 93 %  BP: ()/(54-92) 125/84     Weight: 76.1 kg (167 lb 11.2 oz)  Body mass index is 27.07 kg/m².     SpO2: (!) 93 %         Intake/Output Summary (Last 24 hours) at 1/17/2024 1455  Last data filed at 1/17/2024 1400  Gross per 24 hour   Intake 922.26 ml   Output 2625 ml   Net -1702.74 ml       Lines/Drains/Airways       Drain  Duration                  Urethral Catheter 01/14/24 1441 18 Fr. 3 days              Peripheral Intravenous  "Line  Duration                  Peripheral IV - Single Lumen 01/14/24 1800 20 G Anterior;Left Forearm 2 days         Peripheral IV - Single Lumen 01/16/24 1857 20 G Anterior;Distal;Right Upper Arm <1 day                       Physical Exam  Vitals reviewed.   Constitutional:       General: She is not in acute distress.  Cardiovascular:      Rate and Rhythm: Tachycardia present. Rhythm irregular.      Heart sounds: Murmur heard.   Pulmonary:      Effort: Pulmonary effort is normal.      Breath sounds: Rales present. No wheezing or rhonchi.   Abdominal:      General: Bowel sounds are normal.      Palpations: Abdomen is soft.   Musculoskeletal:      Right lower leg: Edema present.      Left lower leg: Edema present.   Skin:     General: Skin is cool and dry.   Neurological:      Mental Status: She is alert and oriented to person, place, and time.            Significant Labs: ABG: No results for input(s): "PH", "PCO2", "HCO3", "POCSATURATED", "BE" in the last 48 hours., Blood Culture: No results for input(s): "LABBLOO" in the last 48 hours., BMP:   Recent Labs   Lab 01/16/24  0315 01/17/24  0535    114*   * 130*   K 4.5 4.4   CL 99 97*   CO2 27 33*   BUN 21* 22*   CREATININE 0.88 0.84   CALCIUM 9.4 9.3   MG 2.7* 2.3   , CMP   Recent Labs   Lab 01/16/24 0315 01/17/24  0535   * 130*   K 4.5 4.4   CL 99 97*   CO2 27 33*    114*   BUN 21* 22*   CREATININE 0.88 0.84   CALCIUM 9.4 9.3   PROT  --  6.3*   ANIONGAP 9 4*   , CBC   Recent Labs   Lab 01/16/24 0315 01/17/24  0535   WBC 7.98 7.25   HGB 14.0 13.0   HCT 42.9 39.5    256   , INR No results for input(s): "INR", "PROTIME" in the last 48 hours., Lipid Panel   Recent Labs   Lab 01/16/24  1555   CHOL 163   HDL 56   LDLCALC 95   TRIG 62   CHOLHDL 2.9   , and Troponin No results for input(s): "TROPONINI" in the last 48 hours.    Significant Imaging: Echocardiogram: Transthoracic echo (TTE) complete (Cupid Only):   Results for orders placed or " performed during the hospital encounter of 01/14/24   Echo   Result Value Ref Range    BSA 1.81 m2    LVOT stroke volume 62.42 cm3    LVIDd 4.30 3.5 - 6.0 cm    LV Systolic Volume 55.08 mL    LV Systolic Volume Index 30.8 mL/m2    LVIDs 3.62 2.1 - 4.0 cm    LV Diastolic Volume 82.92 mL    LV Diastolic Volume Index 46.32 mL/m2    IVS 1.48 (A) 0.6 - 1.1 cm    LVOT diameter 1.96 cm    LVOT area 3.0 cm2    FS 16 (A) 28 - 44 %    Left Ventricle Relative Wall Thickness 0.38 cm    Posterior Wall 0.82 0.6 - 1.1 cm    LV mass 173.65 g    LV Mass Index 97 g/m2    TR Max Florentino 2.99 m/s    LVOT peak florentino 0.98 m/s    Left Ventricular Outflow Tract Mean Velocity 0.58 cm/s    Left Ventricular Outflow Tract Mean Gradient 1.65 mmHg    Right ventricular length in diastole (apical 4-chamber view) 6.45 cm    RV mid diameter 2.27 cm    TAPSE 1.49 cm    LA size 4.8 cm    RA area 22.0 cm2    AV mean gradient 5 mmHg    AV peak gradient 8 mmHg    Ao peak florentino 1.44 m/s    Ao VTI 29.60 cm    LVOT peak VTI 20.70 cm    AV valve area 2.11 cm²    AV Velocity Ratio 0.68     AV index (prosthetic) 0.70     PRIYANK by Velocity Ratio 2.05 cm²    Triscuspid Valve Regurgitation Peak Gradient 36 mmHg    PV PEAK VELOCITY 0.82 m/s    PV peak gradient 3 mmHg    Ao root annulus 2.60 cm    IVC diameter 2.11 cm    ZLVIDS 1.33     ZLVIDD -1.40     RVDD 3.50 cm    TV resting pulmonary artery pressure 51 mmHg    RV TB RVSP 18 mmHg    Est. RA pres 15 mmHg    Guerrero's Biplane MOD Ejection Fraction 24 %    Narrative      Left Ventricle: The left ventricle is normal in size. Normal wall   thickness. There is eccentric hypertrophy. Regional wall motion   abnormalities present. Anterior and amy-septal wall appears akinetic   There is severely reduced systolic function with a visually estimated   ejection fraction of 20 - 25%. Biplane (2D) method of discs ejection   fraction is 24%. Unable to assess diastolic function due to atrial   fibrillation.    Right Ventricle: Normal  right ventricular cavity size. Systolic   function is normal.    Left Atrium: Left atrium is severely dilated.    Right Atrium: Right atrium is severely dilated.    Aortic Valve: The aortic valve is a trileaflet valve.    Mitral Valve: There is posterior leaflet sclerosis. There is annular   dilation present. There is severe regurgitation.    Tricuspid Valve: There is severe regurgitation.    Pulmonic Valve: There is mild regurgitation.    Pulmonary Artery: The estimated pulmonary artery systolic pressure is   51 mmHg.    IVC/SVC: Elevated venous pressure at 15 mmHg.    ECHO suggestive of restrictive cardiomyopathy with severe systolic   dysfunction and severe mitral regurgitation      and X-Ray: CXR: X-Ray Chest 1 View (CXR): X-Ray Chest AP Portable  Order: [id number]  Status: Final result       Visible to patient: No (inaccessible in Patient Portal)       Next appt: None    0 Result Notes  Details    Reading Physician Reading Date Result Priority   Edwar Wilks DO  [phone] 1/16/2024 STAT     Narrative & Impression  EXAMINATION:  XR CHEST AP PORTABLE     CLINICAL HISTORY:  sob;     TECHNIQUE:  XR CHEST AP PORTABLE     COMPARISON:  01/15/2024     FINDINGS:  No lines or tubes.     Diffuse interstitial and patchy airspace opacities are similar     Bilateral pleural effusions are similar     Cardiac silhouette is similar to comparison exam.     No obvious acute bone findings.     Impression:     Diffuse interstitial and patchy airspace opacities are similar     Bilateral pleural effusions are similar        Electronically signed by: Edwar Wilks  Date:                                            01/16/2024  Time:                                           11:52     Assessment and Plan:     Brief HPI:   89 y/o female with no significant PMH (last seen by pcp a few years back before he retired) who presented to an outside hospital and was noted to be in acute respiratory distress we will with tachypnea,  hypoxia with SpO2 85% and found to be in AFib with RVR rates to 150s with no hypotension. While at outside hospital she was started on non-rebreather with course notable for progression in her respiratory distress with plan for transfer in-house for higher level of care.  While there, she received management including diltiazem 5 mg (dose interrupted due to concern for heart failure), digoxin 500 mcg, Lasix 20 mg IV, ticagrelor load 180 mg and transferred to our ED. On presentation here, she was noted to be in respiratory distress following CTA chest and was initiated on BiPAP.      Labs are notable for unremarkable CBC, creatinine 0.92, mild transaminitis with AST/ALT 43/73, NT pro BNP 40864, free T4 within normal limits at 1.05 and troponin flat x2 (104, 109).  Imaging with CTA chest notable for compressive atelectasis versus consolidation in right lower lobe and bilateral right greater than left pleural effusion, LA and RA dilation and no PE.      1/16/2024:  Cardiology consulted for atrial fibrillation with RVR, hypertensive urgency and suspected new onset heart failure with systolic dysfunction. Patient seen today, sitting up in chair, AAO x 3. States she lives at home alone in [place] and has no family close by, but does have a stepdaughter who lives in [place]. According to patient, she quit driving in December and has a friend who delivers her groceries. States she has still been cutting her grass with a riding  until it recently got cold. She appears to be a very functional 89 y/o until recently declining over the past month or so.        * Acute congestive heart failure  - Patient seen and evaluated by Dr. 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 Escobar  - Continue IV lasix 40 daily  - BP 130s/70s this afternoon; Increase Losartan to 50mg daily and Toprol XL to 50mg daily  - Discussed discharging home on Hospice with patient, given severity of heart failure with severe MR & TR and no family support; She is considering her options.  - Will continue to optimize medical therapy      Severe mitral regurgitation  - Patient seen and evaluated by Dr. Escobar  - Severe MR & TR by echo  - Dr. Escobar had long discussion with patient at bedside and even though she is a very functional 89 y/o, at this time pt is not interested in any invasive procedures and wishes to medically manage  - Starting Entresto today for afterload reduction    1/17/2024:  - Entresto changed to Losartan yesterday due to soft BP  - BP stable today, increased Losartan to 50mg daily    Type 2 MI (myocardial infarction)  - Patient seen and evaluated by Dr. Escobar  - Troponin 104, 109, 65; EKG afib with RVR, IVCD, and PVCs  - Echo with EF 25% and severe MR & TR  - Likely Type 2 MI/demand ischemia in setting of acute heart failure, acute respiratory failure, and afib rvr  - Patient declines any invasive cardiac workup at this time  - Continue medical management; asa, statin, BB, and lovenox 1mg/kg BID (plan to discharge on Eliquis if no contraindications)      Hypertensive urgency  - Better controlled today, continue Losartan for afterload reduction    Acute respiratory failure with hypoxia  - Being followed by primary medical team    Atrial fibrillation with rapid ventricular response  - Patient seen and evaluated by Dr. Escobar  - Rate better controlled on IV amiodarone, rate 90s-low 100s  - Started Toprol XL 25 daily  - Start lovenox 1mg/kg BID (plan to discharge home on Eliquis BID if no contraindications)  - Continue monitoring    1/17/2024:  -  Patient seen and evaluated by Dr. Escobar  - Rate controlled at rest, 90s (not as well controlled with activity)  - Discussed CRISTÓBAL cardioversion, pt would like to continue medical management for now, but may consider procedure if unable to control heart rate  - Transition to PO amiodarone 400mg BID x 1 week, then 200mg daily  - BP stable, increase Toprol XL to 50mg daily  - Consider Eliquis 5mg BID prior to discharge, continue Lovenox for now        VTE Risk Mitigation (From admission, onward)           Ordered     enoxaparin injection 70 mg  Every 12 hours         01/16/24 1525     IP VTE HIGH RISK PATIENT  Once         01/14/24 1751     Place sequential compression device  Until discontinued         01/14/24 1751                    ALVARO Diehl  Cardiology  [facility] ICU

## 2024-01-17 NOTE — ASSESSMENT & PLAN NOTE
Presenting with hypertensive urgency characterize with respiratory distress and arrhythmia.  CTA chest with no evidence of acute aortic pathology.  No renal dysfunction on BNP.  No active chest pain following initiation of nitroglycerin drip in ED  .  Off nitro drip.    Medication management as documented, can remain off nitroglycerin drip.

## 2024-01-17 NOTE — ASSESSMENT & PLAN NOTE
- Patient seen and evaluated by Dr. Escobar  - Troponin 104, 109, 65; EKG afib with RVR, IVCD, and PVCs  - Echo with EF 25% and severe MR & TR  - Likely Type 2 MI/demand ischemia in setting of acute heart failure, acute respiratory failure, and afib rvr  - Patient declines any invasive cardiac workup at this time  - Continue medical management; asa, statin, BB, and lovenox 1mg/kg BID (plan to discharge on Eliquis if no contraindications)

## 2024-01-17 NOTE — ASSESSMENT & PLAN NOTE
- Patient seen and evaluated by Dr. Escobar  - Echo with EF 25%, severe MR & TR, pulmonary hypertension, PASP 51mmHg; ECHO suggestive of restrictive cardiomyopathy with severe systolic dysfunction and severe mitral regurgitation  - Patient declines any invasive cardiac procedures at this time  - Continue medical management  - Start Entresto 24-26 BID: (changed to losartan due to soft BP)  - Lasix 40 IV x 1 dose today  - Continue monitoring    1/17/2024:  - Patient seen and evaluated by Dr. Escobar  - Continue IV lasix 40 daily  - BP 130s/70s this afternoon; Increase Losartan to 50mg daily and Toprol XL to 50mg daily  - Discussed discharging home on Hospice with patient, given severity of heart failure with severe MR & TR and no family support; She is considering her options.  - Will continue to optimize medical therapy

## 2024-01-17 NOTE — ADDENDUM NOTE
Encounter addended by: Alivia Song RN on: 1/17/2024 10:59 AM   Actions taken: ED Visit Navigator Disposition section accepted

## 2024-01-17 NOTE — SUBJECTIVE & OBJECTIVE
Interval History/Significant Events:  No acute events overnight, stable on nasal cannula during the day, did not want additional positive airway pressure therapy today.    Review of Systems  Objective:     Vital Signs (Most Recent):  Temp: 97.4 °F (36.3 °C) (01/16/24 1904)  Pulse: 66 (01/16/24 1915)  Resp: (!) 24 (01/16/24 1915)  BP: 117/73 (01/16/24 1915)  SpO2: (!) 93 % (01/16/24 1915) Vital Signs (24h Range):  Temp:  [97.4 °F (36.3 °C)-98.7 °F (37.1 °C)] 97.4 °F (36.3 °C)  Pulse:  [] 66  Resp:  [17-44] 24  SpO2:  [88 %-98 %] 93 %  BP: ()/() 117/73   Weight: 70.3 kg (155 lb)  Body mass index is 25.02 kg/m².      Intake/Output Summary (Last 24 hours) at 1/16/2024 2001  Last data filed at 1/16/2024 1922  Gross per 24 hour   Intake 747.74 ml   Output 1625 ml   Net -877.26 ml          Physical Exam  Constitutional:       Appearance: Normal appearance.      Comments: Seated upright with bed at 90°   HENT:      Head: Normocephalic and atraumatic.   Eyes:      General: No scleral icterus.  Cardiovascular:      Rate and Rhythm: Tachycardia present. Rhythm irregular.      Heart sounds: Murmur (Grade 2/6 systolic) heard.   Pulmonary:      Breath sounds: Rales present. No rhonchi.   Abdominal:      Palpations: Abdomen is soft.      Tenderness: There is no abdominal tenderness. There is no guarding.   Musculoskeletal:      Cervical back: Normal range of motion.      Right lower leg: No edema.      Left lower leg: No edema.   Skin:     General: Skin is warm.      Capillary Refill: Capillary refill takes less than 2 seconds.      Coloration: Skin is not jaundiced.   Neurological:      General: No focal deficit present.      Mental Status: She is alert. Mental status is at baseline.            Vents:  Oxygen Concentration (%): 32 (01/16/24 0729)  Lines/Drains/Airways       Drain  Duration                  Urethral Catheter 01/14/24 1441 18 Fr. 2 days              Peripheral Intravenous Line  Duration                   Peripheral IV - Single Lumen 01/14/24 1800 20 G Anterior;Left Forearm 2 days         Peripheral IV - Single Lumen 01/16/24 1857 20 G Anterior;Distal;Right Upper Arm <1 day                  Significant Labs:    CBC/Anemia Profile:  Recent Labs   Lab 01/15/24  0451 01/16/24  0315   WBC 7.24 7.98   HGB 12.8 14.0   HCT 39.2 42.9    251   MCV 94.2 92.3   RDW 12.8 12.8        Chemistries:  Recent Labs   Lab 01/15/24  0451 01/16/24  0315   * 130*   K 5.0 4.5    99   CO2 27 27   BUN 25* 21*   CREATININE 0.97 0.88   CALCIUM 9.4 9.4   MG 2.6* 2.7*       All pertinent labs within the past 24 hours have been reviewed.  No evidence of leukocytosis, potassium and lactate all within normal limits.    Significant Imaging:  I have reviewed all pertinent imaging results/findings within the past 24 hours.

## 2024-01-17 NOTE — ASSESSMENT & PLAN NOTE
- Patient seen and evaluated by Dr. Escobar  - Rate better controlled on IV amiodarone, rate 90s-low 100s  - Started Toprol XL 25 daily  - Start lovenox 1mg/kg BID (plan to discharge home on Eliquis BID if no contraindications)  - Continue monitoring    1/17/2024:  - Patient seen and evaluated by Dr. Escobar  - Rate controlled at rest, 90s (not as well controlled with activity)  - Discussed CRISTÓBAL cardioversion, pt would like to continue medical management for now, but may consider procedure if unable to control heart rate  - Transition to PO amiodarone 400mg BID x 1 week, then 200mg daily  - BP stable, increase Toprol XL to 50mg daily  - Consider Eliquis 5mg BID prior to discharge, continue Lovenox for now

## 2024-01-17 NOTE — ASSESSMENT & PLAN NOTE
No prior known cardiac history presenting with evidence of decompensated heart failure with bilateral pleural effusion and respiratory distress. CT imaging with dilated RA and LA and no evidence of VTE including no clot in transit.  Initial management has included Lasix IV.  Plan for further management as follows:    Echo with EF 25%, severe MR & TR, pulmonary hypertension, PASP 51mmHg; ECHO suggestive of restrictive cardiomyopathy with severe systolic dysfunction and severe mitral regurgitation       -appreciate Cardiology recommendations who have begun GDMT for her, as tolerated   -We will continue diuresis to help with her oxygenation and interstitial edema/pleural effusions (responded well to IV diuresis 40 mg Lasix and we will continue  this on a daily basis)

## 2024-01-17 NOTE — PROGRESS NOTES
[facility]  Critical Care Medicine  Progress Note    Patient Name: Windy Swann  MRN: [id number]  Admission Date: 1/14/2024  Hospital Length of Stay: 2 days  Code Status: DNR  Attending Provider: Lester Escobar MD  Primary Care Provider: Alyssa, Primary Doctor   Principal Problem: Acute congestive heart failure    Subjective:     HPI:  89 yo F with no significant medical history, albeit she has not established with any care, presented via OSH with complaints of 3 day history of progressive shortness of breath associated with chest discomfort and found to be in AFib with RVR with transfer in-house and thereafter admission to the ICU for management of severe respiratory distress and AFib with RVR.      On presentation to outside hospital she was noted to be in acute respiratory distress we will with tachypnea, hypoxia with SpO2 85% and found to be in AFib with RVR rates to 150s with no hypotension. While at outside hospital she was started on non-rebreather with course notable for progression in her respiratory distress with plan for transfer in-house for higher level of care.  While there, she received management including diltiazem 5 mg (dose interrupted due to concern for heart failure), digoxin 500 mcg, Lasix 20 mg IV, ticagrelor load 180 mg with transfer thereafter to our ED. on presentation here, she was noted to be in respiratory distress following CTA chest and was initiated on BiPAP.  At time of my assessment, she reports having improvement in her dyspnea with persistent tachypnea.  She is able to speak in full sentences and prefers to communicate by writing down on paper.    At end of my assessment, HR 140s in irregular rhythm, on NIV with SpO2 greater than 95%, BP 150s to 160s over 90s to 110s.  Labs are notable for unremarkable CBC, creatinine 0.92, mild transaminitis with AST/ALT 43/73, NT pro BNP 61615, free T4 within normal limits at 1.05 and troponin flat x2 (104, 109).  ABG reviewed  with the P/F of 142.  Imaging with CTA chest notable for compressive atelectasis versus consolidation in right lower lobe and bilateral right greater than left pleural effusion, LA and RA dilation and no PE.  Cardiology was consulted during ED course with initiation of a nitroglycerin drip for management of hypertensive urgency.    Hospital/ICU Course:    1/15/24-came off BiPAP therapy and tolerated well on nasal cannula.  1/16/24-off nitro drip overnight, doing well on amiodarone.  Echocardiogram with reduced ejection fraction and severe mitral regurgitation    Interval History/Significant Events:  No acute events overnight, stable on nasal cannula during the day, did not want additional positive airway pressure therapy today.    Review of Systems  Objective:     Vital Signs (Most Recent):  Temp: 97.4 °F (36.3 °C) (01/16/24 1904)  Pulse: 66 (01/16/24 1915)  Resp: (!) 24 (01/16/24 1915)  BP: 117/73 (01/16/24 1915)  SpO2: (!) 93 % (01/16/24 1915) Vital Signs (24h Range):  Temp:  [97.4 °F (36.3 °C)-98.7 °F (37.1 °C)] 97.4 °F (36.3 °C)  Pulse:  [] 66  Resp:  [17-44] 24  SpO2:  [88 %-98 %] 93 %  BP: ()/() 117/73   Weight: 70.3 kg (155 lb)  Body mass index is 25.02 kg/m².      Intake/Output Summary (Last 24 hours) at 1/16/2024 2001  Last data filed at 1/16/2024 1922  Gross per 24 hour   Intake 747.74 ml   Output 1625 ml   Net -877.26 ml          Physical Exam  Constitutional:       Appearance: Normal appearance.      Comments: Seated upright with bed at 90°   HENT:      Head: Normocephalic and atraumatic.   Eyes:      General: No scleral icterus.  Cardiovascular:      Rate and Rhythm: Tachycardia present. Rhythm irregular.      Heart sounds: Murmur (Grade 2/6 systolic) heard.   Pulmonary:      Breath sounds: Rales present. No rhonchi.   Abdominal:      Palpations: Abdomen is soft.      Tenderness: There is no abdominal tenderness. There is no guarding.   Musculoskeletal:      Cervical back: Normal range  of motion.      Right lower leg: No edema.      Left lower leg: No edema.   Skin:     General: Skin is warm.      Capillary Refill: Capillary refill takes less than 2 seconds.      Coloration: Skin is not jaundiced.   Neurological:      General: No focal deficit present.      Mental Status: She is alert. Mental status is at baseline.            Vents:  Oxygen Concentration (%): 32 (01/16/24 0729)  Lines/Drains/Airways       Drain  Duration                  Urethral Catheter 01/14/24 1441 18 Fr. 2 days              Peripheral Intravenous Line  Duration                  Peripheral IV - Single Lumen 01/14/24 1800 20 G Anterior;Left Forearm 2 days         Peripheral IV - Single Lumen 01/16/24 1857 20 G Anterior;Distal;Right Upper Arm <1 day                  Significant Labs:    CBC/Anemia Profile:  Recent Labs   Lab 01/15/24  0451 01/16/24  0315   WBC 7.24 7.98   HGB 12.8 14.0   HCT 39.2 42.9    251   MCV 94.2 92.3   RDW 12.8 12.8        Chemistries:  Recent Labs   Lab 01/15/24  0451 01/16/24  0315   * 130*   K 5.0 4.5    99   CO2 27 27   BUN 25* 21*   CREATININE 0.97 0.88   CALCIUM 9.4 9.4   MG 2.6* 2.7*       All pertinent labs within the past 24 hours have been reviewed.  No evidence of leukocytosis, potassium and lactate all within normal limits.    Significant Imaging:  I have reviewed all pertinent imaging results/findings within the past 24 hours.    ABG  Recent Labs   Lab 01/14/24  1444   PH 7.38   PO2 57*   PCO2 46   HCO3 27.2     Assessment/Plan:     Pulmonary  Acute respiratory failure with hypoxia  Presenting in acute respiratory distress recovering upon initiation of NIV in this patient with acute heart failure exacerbation with flash pulmonary edema.  ABG reviewed.  Improved work of breathing while on NIV continued improvement in nasal cannula today.      .    -   Now nasal cannula, saturating well  - titrate FiO2 for goal SpO2 >92%  - we will discontinue antibiotics as no evidence of  pneumonia at this time.    Cardiac/Vascular  * Acute congestive heart failure  No prior known cardiac history presenting with evidence of decompensated heart failure with bilateral pleural effusion and respiratory distress. CT imaging with dilated RA and LA and no evidence of VTE including no clot in transit.  Initial management has included Lasix IV.  Plan for further management as follows:    Echo with EF 25%, severe MR & TR, pulmonary hypertension, PASP 51mmHg; ECHO suggestive of restrictive cardiomyopathy with severe systolic dysfunction and severe mitral regurgitation       -appreciate Cardiology recommendations who have begun GDMT for her, as tolerated   -We will continue diuresis to help with her oxygenation and interstitial edema/pleural effusions (responded well to IV diuresis 40 mg Lasix and we will initiate this on a daily basis)    Severe mitral regurgitation  Appreciate cardiology input who had a detailed discussion with the patient regarding her mitral regurgitation and the patient expressed that she did not want any invasive therapies at this time.  Medication optimization as per Cardiology recommendations.    Type 2 MI (myocardial infarction)   Troponin down trended after being relatively flat, patient with no chest pain.  Did not require systemic anticoagulation with heparin drip.  Stated that she has had no chest pain.    Hypertensive urgency  Presenting with hypertensive urgency characterize with respiratory distress and arrhythmia.  CTA chest with no evidence of acute aortic pathology.  No renal dysfunction on BNP.  No active chest pain following initiation of nitroglycerin drip in ED  .  Off nitro drip.    Medication management as documented, can remain off nitroglycerin drip.    Atrial fibrillation with rapid ventricular response  AFib with RVR on presentation.  Management thus far has included digoxin and amiodarone push.      Continuing amiodarone drip, initiated on anticoagulation by  Cardiology and we will continue at this time with close monitoring of her renal function.  Plan to discharge on oral anticoagulation.           Critical Care Daily Checklist:     A: Awake: RASS Goal/Actual Goal:    Actual:     B: Spontaneous Breathing Trial Performed?    C: SAT & SBT Coordinated?  Not applicable                      D: Delirium: CAM-ICU    E: Early Mobility Performed? No   F: Feeding Goal:    Status:         Current Diet Order   Procedures    Diet Cardiac      AS: Analgesia/Sedation  Not applicable   T: Thromboembolic Prophylaxis Lovenox therapeutic    H: HOB > 300 No   U: Stress Ulcer Prophylaxis (if needed)  Not indicated   G: Glucose Control  monitor   B: Bowel Function    I: Indwelling Catheter (Lines & Nelson) Necessity  appropriate   D: De-escalation of Antimicrobials/Pharmacotherapies  Not indicated     Plan for the day/ETD  As documented     Code Status:  Family/Goals of Care: DNR   Patient updated in detail by bedside      Critical Care Time:  35 minutes  Critical secondary to Patient has a condition that poses threat to life and bodily function:  atrial fibrillation with RVR, hypertensive urgency, acute hypoxic respiratory failure, flash pulmonary edema      Critical care was time spent personally by me on the following activities: development of treatment plan with patient or surrogate and bedside caregivers, discussions with consultants, evaluation of patient's response to treatment, examination of patient, ordering and performing treatments and interventions, ordering and review of laboratory studies, ordering and review of radiographic studies, pulse oximetry, re-evaluation of patient's condition. This critical care time did not overlap with that of any other provider or involve time for any procedures.     Lester Escobar MD  Critical Care Medicine  [facility]

## 2024-01-17 NOTE — ASSESSMENT & PLAN NOTE
- Patient seen and evaluated by Dr. Escobar  - Severe MR & TR by echo  - Dr. Escobar had long discussion with patient at bedside and even though she is a very functional 89 y/o, at this time pt is not interested in any invasive procedures and wishes to medically manage  - Starting Entresto today for afterload reduction    1/17/2024:  - Entresto changed to Losartan yesterday due to soft BP  - BP stable today, increased Losartan to 50mg daily

## 2024-01-17 NOTE — ASSESSMENT & PLAN NOTE
Presenting in acute respiratory distress recovering upon initiation of NIV in this patient with acute heart failure exacerbation with flash pulmonary edema.  ABG reviewed.  Improved work of breathing while on NIV continued improvement in nasal cannula today.      .    -   Now nasal cannula, saturating well  - titrate FiO2 for goal SpO2 >92%

## 2024-01-17 NOTE — PLAN OF CARE
Problem: Adult Inpatient Plan of Care  Goal: Plan of Care Review  Outcome: Ongoing, Progressing  Goal: Patient-Specific Goal (Individualized)  Outcome: Ongoing, Progressing  Goal: Absence of Hospital-Acquired Illness or Injury  Outcome: Ongoing, Progressing  Goal: Optimal Comfort and Wellbeing  Outcome: Ongoing, Progressing  Goal: Readiness for Transition of Care  Outcome: Ongoing, Progressing     Problem: Infection  Goal: Absence of Infection Signs and Symptoms  Outcome: Ongoing, Progressing     Problem: Noninvasive Ventilation Acute  Goal: Effective Unassisted Ventilation and Oxygenation  Outcome: Ongoing, Progressing     Problem: Fall Injury Risk  Goal: Absence of Fall and Fall-Related Injury  Outcome: Ongoing, Progressing     Problem: Gas Exchange Impaired  Goal: Optimal Gas Exchange  Outcome: Ongoing, Progressing     Problem: Skin Injury Risk Increased  Goal: Skin Health and Integrity  Outcome: Ongoing, Progressing

## 2024-01-17 NOTE — ASSESSMENT & PLAN NOTE
AFib with RVR on presentation.  Management thus far has included digoxin and amiodarone push.      Continuing amiodarone drip, initiated on anticoagulation by Cardiology and we will continue at this time with close monitoring of her renal function.  Plan to discharge on oral anticoagulation.

## 2024-01-17 NOTE — ASSESSMENT & PLAN NOTE
Presenting in acute respiratory distress recovering upon initiation of NIV in this patient with acute heart failure exacerbation with flash pulmonary edema.  ABG reviewed.  Improved work of breathing while on NIV continued improvement in nasal cannula today.      .    -   Now nasal cannula, saturating well  - titrate FiO2 for goal SpO2 >92%  - we will discontinue antibiotics as no evidence of pneumonia at this time.

## 2024-01-17 NOTE — ASSESSMENT & PLAN NOTE
No prior known cardiac history presenting with evidence of decompensated heart failure with bilateral pleural effusion and respiratory distress. CT imaging with dilated RA and LA and no evidence of VTE including no clot in transit.  Initial management has included Lasix IV.  Plan for further management as follows:    Echo with EF 25%, severe MR & TR, pulmonary hypertension, PASP 51mmHg; ECHO suggestive of restrictive cardiomyopathy with severe systolic dysfunction and severe mitral regurgitation       -appreciate Cardiology recommendations who have begun GDMT for her, as tolerated   -We will continue diuresis to help with her oxygenation and interstitial edema/pleural effusions (responded well to IV diuresis 40 mg Lasix and we will initiate this on a daily basis)

## 2024-01-17 NOTE — PLAN OF CARE
Problem: Infection  Goal: Absence of Infection Signs and Symptoms  Outcome: Ongoing, Progressing  Intervention: Prevent or Manage Infection  Flowsheets (Taken 1/16/2024 1918)  Fever Reduction/Comfort Measures: lightweight bedding  Infection Management: aseptic technique maintained  Isolation Precautions: precautions maintained     Problem: Fall Injury Risk  Goal: Absence of Fall and Fall-Related Injury  Outcome: Ongoing, Progressing  Intervention: Identify and Manage Contributors  Flowsheets (Taken 1/16/2024 1918)  Self-Care Promotion:   independence encouraged   safe use of adaptive equipment encouraged  Medication Review/Management:   medications reviewed   high-risk medications identified  Intervention: Promote Injury-Free Environment  Flowsheets (Taken 1/16/2024 1918)  Safety Promotion/Fall Prevention:   bed alarm set   side rails raised x 2   instructed to call staff for mobility   Fall Risk signage in place   Fall Risk reviewed with patient/family

## 2024-01-17 NOTE — ASSESSMENT & PLAN NOTE
RESOLVED    Presenting with hypertensive urgency characterize with respiratory distress and arrhythmia.  CTA chest with no evidence of acute aortic pathology.  No renal dysfunction on BNP.  No active chest pain following initiation of nitroglycerin drip in ED  .  Off nitro drip.    Medication management as documented, can remain off nitroglycerin drip.

## 2024-01-17 NOTE — ASSESSMENT & PLAN NOTE
Appreciate cardiology input who had a detailed discussion with the patient regarding her mitral regurgitation and the patient expressed that she did not want any invasive therapies at this time.  Medication optimization as per Cardiology recommendations.

## 2024-01-17 NOTE — SUBJECTIVE & OBJECTIVE
Interval History/Significant Events:  No significant events overnight.    Review of Systems  Objective:     Vital Signs (Most Recent):  Temp: 98 °F (36.7 °C) (01/17/24 1145)  Pulse: 104 (01/17/24 1430)  Resp: (!) 21 (01/17/24 1430)  BP: 128/73 (01/17/24 1621)  SpO2: (!) 93 % (01/17/24 1430) Vital Signs (24h Range):  Temp:  [97.4 °F (36.3 °C)-98.1 °F (36.7 °C)] 98 °F (36.7 °C)  Pulse:  [] 104  Resp:  [17-42] 21  SpO2:  [81 %-98 %] 93 %  BP: (100-137)/(54-92) 128/73   Weight: 76.1 kg (167 lb 11.2 oz)  Body mass index is 27.07 kg/m².      Intake/Output Summary (Last 24 hours) at 1/17/2024 1632  Last data filed at 1/17/2024 1400  Gross per 24 hour   Intake 922.26 ml   Output 2625 ml   Net -1702.74 ml          Physical Exam  Constitutional:       Appearance: Normal appearance.      Comments: Seated upright with bed at 90°   HENT:      Head: Normocephalic and atraumatic.   Eyes:      General: No scleral icterus.  Cardiovascular:      Rate and Rhythm: Tachycardia present. Rhythm irregular.      Heart sounds: Murmur (Grade 2/6 systolic) heard.   Pulmonary:      Breath sounds: Rales present. No rhonchi.   Abdominal:      Palpations: Abdomen is soft.      Tenderness: There is no abdominal tenderness. There is no guarding.   Musculoskeletal:      Cervical back: Normal range of motion.      Right lower leg: No edema.      Left lower leg: No edema.   Skin:     General: Skin is warm.      Capillary Refill: Capillary refill takes less than 2 seconds.      Coloration: Skin is not jaundiced.   Neurological:      General: No focal deficit present.      Mental Status: She is alert. Mental status is at baseline.            Vents:  Oxygen Concentration (%): 32 (01/16/24 0729)  Lines/Drains/Airways       Drain  Duration                  Urethral Catheter 01/14/24 1441 18 Fr. 3 days              Peripheral Intravenous Line  Duration                  Peripheral IV - Single Lumen 01/14/24 1800 20 G Anterior;Left Forearm 2 days          Peripheral IV - Single Lumen 01/16/24 1857 20 G Anterior;Distal;Right Upper Arm <1 day                  Significant Labs:    CBC/Anemia Profile:  Recent Labs   Lab 01/16/24 0315 01/17/24  0535   WBC 7.98 7.25   HGB 14.0 13.0   HCT 42.9 39.5    256   MCV 92.3 91.6   RDW 12.8 12.7        Chemistries:  Recent Labs   Lab 01/16/24 0315 01/17/24  0535   * 130*   K 4.5 4.4   CL 99 97*   CO2 27 33*   BUN 21* 22*   CREATININE 0.88 0.84   CALCIUM 9.4 9.3   PROT  --  6.3*   MG 2.7* 2.3       All pertinent labs within the past 24 hours have been reviewed.    Significant Imaging:  I have reviewed all pertinent imaging results/findings within the past 24 hours.

## 2024-01-17 NOTE — SUBJECTIVE & OBJECTIVE
Interval History: Patient seen today by Dr. Escobar who discussed possible cardioversion if unable to control heart rate with activity. She also had a long detailed discussion regarding goals of care and patient's expectations, including home hospice as option.     Review of Systems   Constitutional: Positive for malaise/fatigue. Negative for chills and fever.   HENT: Negative.     Eyes: Negative.    Cardiovascular:  Positive for dyspnea on exertion, leg swelling, orthopnea and palpitations. Negative for chest pain.   Respiratory:  Positive for shortness of breath and sleep disturbances due to breathing.    Gastrointestinal:  Negative for abdominal pain, nausea and vomiting.   Genitourinary: Negative.    Neurological:  Positive for weakness.     Objective:     Vital Signs (Most Recent):  Temp: 98 °F (36.7 °C) (01/17/24 1145)  Pulse: 104 (01/17/24 1430)  Resp: (!) 21 (01/17/24 1430)  BP: 125/84 (01/17/24 1430)  SpO2: (!) 93 % (01/17/24 1430) Vital Signs (24h Range):  Temp:  [97.4 °F (36.3 °C)-98.1 °F (36.7 °C)] 98 °F (36.7 °C)  Pulse:  [] 104  Resp:  [17-42] 21  SpO2:  [81 %-98 %] 93 %  BP: ()/(54-92) 125/84     Weight: 76.1 kg (167 lb 11.2 oz)  Body mass index is 27.07 kg/m².     SpO2: (!) 93 %         Intake/Output Summary (Last 24 hours) at 1/17/2024 1455  Last data filed at 1/17/2024 1400  Gross per 24 hour   Intake 922.26 ml   Output 2625 ml   Net -1702.74 ml       Lines/Drains/Airways       Drain  Duration                  Urethral Catheter 01/14/24 1441 18 Fr. 3 days              Peripheral Intravenous Line  Duration                  Peripheral IV - Single Lumen 01/14/24 1800 20 G Anterior;Left Forearm 2 days         Peripheral IV - Single Lumen 01/16/24 1857 20 G Anterior;Distal;Right Upper Arm <1 day                       Physical Exam  Vitals reviewed.   Constitutional:       General: She is not in acute distress.  Cardiovascular:      Rate and Rhythm: Tachycardia present. Rhythm irregular.       "Heart sounds: Murmur heard.   Pulmonary:      Effort: Pulmonary effort is normal.      Breath sounds: Rales present. No wheezing or rhonchi.   Abdominal:      General: Bowel sounds are normal.      Palpations: Abdomen is soft.   Musculoskeletal:      Right lower leg: Edema present.      Left lower leg: Edema present.   Skin:     General: Skin is cool and dry.   Neurological:      Mental Status: She is alert and oriented to person, place, and time.            Significant Labs: ABG: No results for input(s): "PH", "PCO2", "HCO3", "POCSATURATED", "BE" in the last 48 hours., Blood Culture: No results for input(s): "LABBLOO" in the last 48 hours., BMP:   Recent Labs   Lab 01/16/24 0315 01/17/24  0535    114*   * 130*   K 4.5 4.4   CL 99 97*   CO2 27 33*   BUN 21* 22*   CREATININE 0.88 0.84   CALCIUM 9.4 9.3   MG 2.7* 2.3   , CMP   Recent Labs   Lab 01/16/24 0315 01/17/24  0535   * 130*   K 4.5 4.4   CL 99 97*   CO2 27 33*    114*   BUN 21* 22*   CREATININE 0.88 0.84   CALCIUM 9.4 9.3   PROT  --  6.3*   ANIONGAP 9 4*   , CBC   Recent Labs   Lab 01/16/24 0315 01/17/24  0535   WBC 7.98 7.25   HGB 14.0 13.0   HCT 42.9 39.5    256   , INR No results for input(s): "INR", "PROTIME" in the last 48 hours., Lipid Panel   Recent Labs   Lab 01/16/24  1555   CHOL 163   HDL 56   LDLCALC 95   TRIG 62   CHOLHDL 2.9   , and Troponin No results for input(s): "TROPONINI" in the last 48 hours.    Significant Imaging: Echocardiogram: Transthoracic echo (TTE) complete (Cupid Only):   Results for orders placed or performed during the hospital encounter of 01/14/24   Echo   Result Value Ref Range    BSA 1.81 m2    LVOT stroke volume 62.42 cm3    LVIDd 4.30 3.5 - 6.0 cm    LV Systolic Volume 55.08 mL    LV Systolic Volume Index 30.8 mL/m2    LVIDs 3.62 2.1 - 4.0 cm    LV Diastolic Volume 82.92 mL    LV Diastolic Volume Index 46.32 mL/m2    IVS 1.48 (A) 0.6 - 1.1 cm    LVOT diameter 1.96 cm    LVOT area 3.0 cm2 "    FS 16 (A) 28 - 44 %    Left Ventricle Relative Wall Thickness 0.38 cm    Posterior Wall 0.82 0.6 - 1.1 cm    LV mass 173.65 g    LV Mass Index 97 g/m2    TR Max Florentino 2.99 m/s    LVOT peak florentino 0.98 m/s    Left Ventricular Outflow Tract Mean Velocity 0.58 cm/s    Left Ventricular Outflow Tract Mean Gradient 1.65 mmHg    Right ventricular length in diastole (apical 4-chamber view) 6.45 cm    RV mid diameter 2.27 cm    TAPSE 1.49 cm    LA size 4.8 cm    RA area 22.0 cm2    AV mean gradient 5 mmHg    AV peak gradient 8 mmHg    Ao peak florentino 1.44 m/s    Ao VTI 29.60 cm    LVOT peak VTI 20.70 cm    AV valve area 2.11 cm²    AV Velocity Ratio 0.68     AV index (prosthetic) 0.70     PRIYANK by Velocity Ratio 2.05 cm²    Triscuspid Valve Regurgitation Peak Gradient 36 mmHg    PV PEAK VELOCITY 0.82 m/s    PV peak gradient 3 mmHg    Ao root annulus 2.60 cm    IVC diameter 2.11 cm    ZLVIDS 1.33     ZLVIDD -1.40     RVDD 3.50 cm    TV resting pulmonary artery pressure 51 mmHg    RV TB RVSP 18 mmHg    Est. RA pres 15 mmHg    Guerrero's Biplane MOD Ejection Fraction 24 %    Narrative      Left Ventricle: The left ventricle is normal in size. Normal wall   thickness. There is eccentric hypertrophy. Regional wall motion   abnormalities present. Anterior and amy-septal wall appears akinetic   There is severely reduced systolic function with a visually estimated   ejection fraction of 20 - 25%. Biplane (2D) method of discs ejection   fraction is 24%. Unable to assess diastolic function due to atrial   fibrillation.    Right Ventricle: Normal right ventricular cavity size. Systolic   function is normal.    Left Atrium: Left atrium is severely dilated.    Right Atrium: Right atrium is severely dilated.    Aortic Valve: The aortic valve is a trileaflet valve.    Mitral Valve: There is posterior leaflet sclerosis. There is annular   dilation present. There is severe regurgitation.    Tricuspid Valve: There is severe regurgitation.     Pulmonic Valve: There is mild regurgitation.    Pulmonary Artery: The estimated pulmonary artery systolic pressure is   51 mmHg.    IVC/SVC: Elevated venous pressure at 15 mmHg.    ECHO suggestive of restrictive cardiomyopathy with severe systolic   dysfunction and severe mitral regurgitation      and X-Ray: CXR: X-Ray Chest 1 View (CXR): X-Ray Chest AP Portable  Order: [id number]  Status: Final result       Visible to patient: No (inaccessible in Patient Portal)       Next appt: None    0 Result Notes  Details    Reading Physician Reading Date Result Priority   Edwar Wilks DO  [phone] 1/16/2024 STAT     Narrative & Impression  EXAMINATION:  XR CHEST AP PORTABLE     CLINICAL HISTORY:  sob;     TECHNIQUE:  XR CHEST AP PORTABLE     COMPARISON:  01/15/2024     FINDINGS:  No lines or tubes.     Diffuse interstitial and patchy airspace opacities are similar     Bilateral pleural effusions are similar     Cardiac silhouette is similar to comparison exam.     No obvious acute bone findings.     Impression:     Diffuse interstitial and patchy airspace opacities are similar     Bilateral pleural effusions are similar        Electronically signed by: Edwar Wilks  Date:                                            01/16/2024  Time:                                           11:52

## 2024-01-18 LAB
ANION GAP SERPL CALCULATED.3IONS-SCNC: 6 MMOL/L (ref 7–16)
BASOPHILS # BLD AUTO: 0.07 K/UL (ref 0–0.2)
BASOPHILS NFR BLD AUTO: 0.9 % (ref 0–1)
BUN SERPL-MCNC: 20 MG/DL (ref 7–18)
BUN/CREAT SERPL: 26 (ref 6–20)
CALCIUM SERPL-MCNC: 9.2 MG/DL (ref 8.5–10.1)
CHLORIDE SERPL-SCNC: 99 MMOL/L (ref 98–107)
CO2 SERPL-SCNC: 30 MMOL/L (ref 21–32)
CREAT SERPL-MCNC: 0.78 MG/DL (ref 0.55–1.02)
DIFFERENTIAL METHOD BLD: ABNORMAL
EGFR (NO RACE VARIABLE) (RUSH/TITUS): 72 ML/MIN/1.73M2
EOSINOPHIL # BLD AUTO: 0.08 K/UL (ref 0–0.5)
EOSINOPHIL NFR BLD AUTO: 1 % (ref 1–4)
ERYTHROCYTE [DISTWIDTH] IN BLOOD BY AUTOMATED COUNT: 12.9 % (ref 11.5–14.5)
GLUCOSE SERPL-MCNC: 104 MG/DL (ref 74–106)
GLUCOSE SERPL-MCNC: 151 MG/DL (ref 70–105)
GLUCOSE SERPL-MCNC: 157 MG/DL (ref 70–105)
GLUCOSE SERPL-MCNC: 159 MG/DL (ref 70–105)
HCT VFR BLD AUTO: 42.5 % (ref 38–47)
HGB BLD-MCNC: 13.8 G/DL (ref 12–16)
IMM GRANULOCYTES # BLD AUTO: 0.03 K/UL (ref 0–0.04)
IMM GRANULOCYTES NFR BLD: 0.4 % (ref 0–0.4)
KAPPA LC FREE SER-MCNC: 2.49 MG/DL (ref 0.33–1.94)
KAPPA LC FREE/LAMBDA FREE SER: 1.22 {RATIO} (ref 0.26–1.65)
LAMBDA LC FREE SERPL-MCNC: 2.04 MG/DL (ref 0.57–2.63)
LYMPHOCYTES # BLD AUTO: 1.41 K/UL (ref 1–4.8)
LYMPHOCYTES NFR BLD AUTO: 18.1 % (ref 27–41)
MAGNESIUM SERPL-MCNC: 2.4 MG/DL (ref 1.7–2.3)
MCH RBC QN AUTO: 30.5 PG (ref 27–31)
MCHC RBC AUTO-ENTMCNC: 32.5 G/DL (ref 32–36)
MCV RBC AUTO: 94 FL (ref 80–96)
MONOCYTES # BLD AUTO: 0.57 K/UL (ref 0–0.8)
MONOCYTES NFR BLD AUTO: 7.3 % (ref 2–6)
MPC BLD CALC-MCNC: 10.6 FL (ref 9.4–12.4)
NEUTROPHILS # BLD AUTO: 5.63 K/UL (ref 1.8–7.7)
NEUTROPHILS NFR BLD AUTO: 72.3 % (ref 53–65)
NRBC # BLD AUTO: 0 X10E3/UL
NRBC, AUTO (.00): 0 %
PLATELET # BLD AUTO: 211 K/UL (ref 150–400)
POTASSIUM SERPL-SCNC: 4.3 MMOL/L (ref 3.5–5.1)
PROCALCITONIN SERPL-MCNC: <0.1 NG/ML
RBC # BLD AUTO: 4.52 M/UL (ref 4.2–5.4)
SODIUM SERPL-SCNC: 131 MMOL/L (ref 136–145)
WBC # BLD AUTO: 7.79 K/UL (ref 4.5–11)

## 2024-01-18 PROCEDURE — 25000003 PHARM REV CODE 250: Performed by: STUDENT IN AN ORGANIZED HEALTH CARE EDUCATION/TRAINING PROGRAM

## 2024-01-18 PROCEDURE — 82962 GLUCOSE BLOOD TEST: CPT

## 2024-01-18 PROCEDURE — 83735 ASSAY OF MAGNESIUM: CPT | Performed by: STUDENT IN AN ORGANIZED HEALTH CARE EDUCATION/TRAINING PROGRAM

## 2024-01-18 PROCEDURE — 63600175 PHARM REV CODE 636 W HCPCS: Performed by: STUDENT IN AN ORGANIZED HEALTH CARE EDUCATION/TRAINING PROGRAM

## 2024-01-18 PROCEDURE — 99233 SBSQ HOSP IP/OBS HIGH 50: CPT | Mod: ,,, | Performed by: STUDENT IN AN ORGANIZED HEALTH CARE EDUCATION/TRAINING PROGRAM

## 2024-01-18 PROCEDURE — 94640 AIRWAY INHALATION TREATMENT: CPT

## 2024-01-18 PROCEDURE — 11000001 HC ACUTE MED/SURG PRIVATE ROOM

## 2024-01-18 PROCEDURE — 27000221 HC OXYGEN, UP TO 24 HOURS

## 2024-01-18 PROCEDURE — 85025 COMPLETE CBC W/AUTO DIFF WBC: CPT | Performed by: STUDENT IN AN ORGANIZED HEALTH CARE EDUCATION/TRAINING PROGRAM

## 2024-01-18 PROCEDURE — 94761 N-INVAS EAR/PLS OXIMETRY MLT: CPT

## 2024-01-18 PROCEDURE — 25000003 PHARM REV CODE 250: Performed by: INTERNAL MEDICINE

## 2024-01-18 PROCEDURE — 80048 BASIC METABOLIC PNL TOTAL CA: CPT | Performed by: STUDENT IN AN ORGANIZED HEALTH CARE EDUCATION/TRAINING PROGRAM

## 2024-01-18 PROCEDURE — 25000242 PHARM REV CODE 250 ALT 637 W/ HCPCS: Performed by: STUDENT IN AN ORGANIZED HEALTH CARE EDUCATION/TRAINING PROGRAM

## 2024-01-18 PROCEDURE — 99900035 HC TECH TIME PER 15 MIN (STAT)

## 2024-01-18 PROCEDURE — 63600175 PHARM REV CODE 636 W HCPCS: Performed by: NURSE PRACTITIONER

## 2024-01-18 PROCEDURE — 25000003 PHARM REV CODE 250: Performed by: NURSE PRACTITIONER

## 2024-01-18 RX ADMIN — METOPROLOL SUCCINATE 50 MG: 50 TABLET, EXTENDED RELEASE ORAL at 08:01

## 2024-01-18 RX ADMIN — FUROSEMIDE 40 MG: 10 INJECTION, SOLUTION INTRAVENOUS at 08:01

## 2024-01-18 RX ADMIN — AMIODARONE HYDROCHLORIDE 400 MG: 200 TABLET ORAL at 08:01

## 2024-01-18 RX ADMIN — ENOXAPARIN SODIUM 70 MG: 80 INJECTION SUBCUTANEOUS at 08:01

## 2024-01-18 RX ADMIN — MUPIROCIN: 20 OINTMENT TOPICAL at 08:01

## 2024-01-18 RX ADMIN — ATORVASTATIN CALCIUM 20 MG: 20 TABLET, FILM COATED ORAL at 08:01

## 2024-01-18 RX ADMIN — TRAZODONE HYDROCHLORIDE 25 MG: 50 TABLET ORAL at 08:01

## 2024-01-18 RX ADMIN — ASPIRIN 81 MG: 81 TABLET, COATED ORAL at 08:01

## 2024-01-18 RX ADMIN — IPRATROPIUM BROMIDE AND ALBUTEROL SULFATE 3 ML: .5; 3 SOLUTION RESPIRATORY (INHALATION) at 10:01

## 2024-01-18 RX ADMIN — LOSARTAN POTASSIUM 50 MG: 50 TABLET, FILM COATED ORAL at 08:01

## 2024-01-18 NOTE — ASSESSMENT & PLAN NOTE
- Patient seen and evaluated by Dr. Escobar  - Echo with EF 25%, severe MR & TR, pulmonary hypertension, PASP 51mmHg; ECHO suggestive of restrictive cardiomyopathy with severe systolic dysfunction and severe mitral regurgitation  - Patient declines any invasive cardiac procedures at this time  - Continue medical management  - Start Entresto 24-26 BID: (changed to losartan due to soft BP)  - Lasix 40 IV x 1 dose today  - Continue monitoring    1/17/2024:  - Patient seen and evaluated by Dr. Escobar  - Continue IV lasix 40 daily  - BP 130s/70s this afternoon; Increase Losartan to 50mg daily and Toprol XL to 50mg daily  - Discussed discharging home on Hospice with patient, given severity of heart failure with severe MR & TR and no family support; She is considering her options.  - Will continue to optimize medical therapy    1/18/2024:  - Patient seen and evaluated by Dr. Escobar  - Tolerating Losartan 50 daily and Toprol 50 daily

## 2024-01-18 NOTE — ASSESSMENT & PLAN NOTE
- Patient seen and evaluated by Dr. Escobar  - Severe MR & TR by echo  - Dr. Escobar had long discussion with patient at bedside and even though she is a very functional 89 y/o, at this time pt is not interested in any invasive procedures and wishes to medically manage  - Starting Entresto today for afterload reduction    1/17/2024:  - Entresto changed to Losartan yesterday due to soft BP  - BP stable today, increased Losartan to 50mg daily    1/18/2024:  - Tolerating Losartan 50

## 2024-01-18 NOTE — ASSESSMENT & PLAN NOTE
- Patient seen and evaluated by Dr. Escobar  - Rate better controlled on IV amiodarone, rate 90s-low 100s  - Started Toprol XL 25 daily  - Start lovenox 1mg/kg BID (plan to discharge home on Eliquis BID if no contraindications)  - Continue monitoring    1/17/2024:  - Patient seen and evaluated by Dr. Escobar  - Rate controlled at rest, 90s (not as well controlled with activity)  - Discussed CRISTÓBAL cardioversion, pt would like to continue medical management for now, but may consider procedure if unable to control heart rate  - Transition to PO amiodarone 400mg BID x 1 week, then 200mg daily  - BP stable, increase Toprol XL to 50mg daily  - Consider Eliquis 5mg BID prior to discharge, continue Lovenox for now    1/18/2024:  - Patient seen and evaluated by Dr. Escobar  - Recommended CRISTÓBAL with cardioversion again today, patient declined  - Continue PO amiodarone and Toprol 50  - Consider Eliquis 5mg BID prior to discharge

## 2024-01-18 NOTE — PROGRESS NOTES
[facility]  Cardiology  Progress Note    Patient Name: Windy Swann  MRN: [id number]  Admission Date: 1/14/2024  Hospital Length of Stay: 4 days  Code Status: DNR   Attending Physician: Lester Escobar MD   Primary Care Physician: Alyssa, Primary Doctor  Expected Discharge Date:   Principal Problem:Acute congestive heart failure    Subjective:     Hospital Course:   No notes on file    Interval History: Patient seen and evaluated by Dr. Escobar, HR better controlled at rest, still afib rvr with activity.     Review of Systems   Constitutional: Positive for malaise/fatigue. Negative for chills and fever.   HENT: Negative.     Eyes: Negative.    Cardiovascular:  Positive for dyspnea on exertion, leg swelling, orthopnea and palpitations. Negative for chest pain.   Respiratory:  Positive for shortness of breath and sleep disturbances due to breathing.    Gastrointestinal:  Negative for abdominal pain, nausea and vomiting.   Genitourinary: Negative.    Neurological:  Positive for weakness.     Objective:     Vital Signs (Most Recent):  Temp: 98.1 °F (36.7 °C) (01/18/24 1215)  Pulse: 101 (01/18/24 1245)  Resp: (!) 33 (01/18/24 1245)  BP: 106/62 (01/18/24 1230)  SpO2: 96 % (01/18/24 1245) Vital Signs (24h Range):  Temp:  [98 °F (36.7 °C)-98.5 °F (36.9 °C)] 98.1 °F (36.7 °C)  Pulse:  [] 101  Resp:  [17-42] 33  SpO2:  [81 %-99 %] 96 %  BP: ()/(50-97) 106/62     Weight: 75 kg (165 lb 6.4 oz)  Body mass index is 26.7 kg/m².     SpO2: 96 %         Intake/Output Summary (Last 24 hours) at 1/18/2024 1614  Last data filed at 1/18/2024 1504  Gross per 24 hour   Intake --   Output 1600 ml   Net -1600 ml       Lines/Drains/Airways       Drain  Duration             Female External Urinary Catheter w/ Suction 01/17/24 1715 <1 day              Peripheral Intravenous Line  Duration                  Peripheral IV - Single Lumen 01/14/24 1800 20 G Anterior;Left Forearm 3 days         Peripheral IV - Single Lumen  "01/16/24 1857 20 G Anterior;Distal;Right Upper Arm 1 day                       Physical Exam  Vitals reviewed.   Constitutional:       General: She is not in acute distress.  Cardiovascular:      Rate and Rhythm: Normal rate. Rhythm irregular.      Heart sounds: Murmur heard.   Pulmonary:      Effort: Pulmonary effort is normal.      Breath sounds: Rales present. No wheezing or rhonchi.   Abdominal:      General: Bowel sounds are normal.      Palpations: Abdomen is soft.   Musculoskeletal:      Right lower leg: Edema present.      Left lower leg: Edema present.   Skin:     General: Skin is cool and dry.   Neurological:      Mental Status: She is alert and oriented to person, place, and time.            Significant Labs: ABG: No results for input(s): "PH", "PCO2", "HCO3", "POCSATURATED", "BE" in the last 48 hours., Blood Culture: No results for input(s): "LABBLOO" in the last 48 hours., BMP:   Recent Labs   Lab 01/17/24  0535 01/18/24  0503   * 104   * 131*   K 4.4 4.3   CL 97* 99   CO2 33* 30   BUN 22* 20*   CREATININE 0.84 0.78   CALCIUM 9.3 9.2   MG 2.3 2.4*   , CMP   Recent Labs   Lab 01/17/24  0535 01/18/24  0503   * 131*   K 4.4 4.3   CL 97* 99   CO2 33* 30   * 104   BUN 22* 20*   CREATININE 0.84 0.78   CALCIUM 9.3 9.2   PROT 6.3*  --    ANIONGAP 4* 6*   , CBC   Recent Labs   Lab 01/17/24  0535 01/18/24  0503   WBC 7.25 7.79   HGB 13.0 13.8   HCT 39.5 42.5    211   , INR No results for input(s): "INR", "PROTIME" in the last 48 hours., Lipid Panel No results for input(s): "CHOL", "HDL", "LDLCALC", "TRIG", "CHOLHDL" in the last 48 hours., and Troponin No results for input(s): "TROPONINI" in the last 48 hours.    Significant Imaging: Echocardiogram: Transthoracic echo (TTE) complete (Cupid Only):   Results for orders placed or performed during the hospital encounter of 01/14/24   Echo   Result Value Ref Range    BSA 1.81 m2    LVOT stroke volume 62.42 cm3    LVIDd 4.30 3.5 - 6.0 cm "    LV Systolic Volume 55.08 mL    LV Systolic Volume Index 30.8 mL/m2    LVIDs 3.62 2.1 - 4.0 cm    LV Diastolic Volume 82.92 mL    LV Diastolic Volume Index 46.32 mL/m2    IVS 1.48 (A) 0.6 - 1.1 cm    LVOT diameter 1.96 cm    LVOT area 3.0 cm2    FS 16 (A) 28 - 44 %    Left Ventricle Relative Wall Thickness 0.38 cm    Posterior Wall 0.82 0.6 - 1.1 cm    LV mass 173.65 g    LV Mass Index 97 g/m2    TR Max Florentino 2.99 m/s    LVOT peak florentino 0.98 m/s    Left Ventricular Outflow Tract Mean Velocity 0.58 cm/s    Left Ventricular Outflow Tract Mean Gradient 1.65 mmHg    Right ventricular length in diastole (apical 4-chamber view) 6.45 cm    RV mid diameter 2.27 cm    TAPSE 1.49 cm    LA size 4.8 cm    RA area 22.0 cm2    AV mean gradient 5 mmHg    AV peak gradient 8 mmHg    Ao peak florentino 1.44 m/s    Ao VTI 29.60 cm    LVOT peak VTI 20.70 cm    AV valve area 2.11 cm²    AV Velocity Ratio 0.68     AV index (prosthetic) 0.70     PRIYANK by Velocity Ratio 2.05 cm²    Triscuspid Valve Regurgitation Peak Gradient 36 mmHg    PV PEAK VELOCITY 0.82 m/s    PV peak gradient 3 mmHg    Ao root annulus 2.60 cm    IVC diameter 2.11 cm    ZLVIDS 1.33     ZLVIDD -1.40     RVDD 3.50 cm    TV resting pulmonary artery pressure 51 mmHg    RV TB RVSP 18 mmHg    Est. RA pres 15 mmHg    Guerrero's Biplane MOD Ejection Fraction 24 %    Narrative      Left Ventricle: The left ventricle is normal in size. Normal wall   thickness. There is eccentric hypertrophy. Regional wall motion   abnormalities present. Anterior and amy-septal wall appears akinetic   There is severely reduced systolic function with a visually estimated   ejection fraction of 20 - 25%. Biplane (2D) method of discs ejection   fraction is 24%. Unable to assess diastolic function due to atrial   fibrillation.    Right Ventricle: Normal right ventricular cavity size. Systolic   function is normal.    Left Atrium: Left atrium is severely dilated.    Right Atrium: Right atrium is severely  dilated.    Aortic Valve: The aortic valve is a trileaflet valve.    Mitral Valve: There is posterior leaflet sclerosis. There is annular   dilation present. There is severe regurgitation.    Tricuspid Valve: There is severe regurgitation.    Pulmonic Valve: There is mild regurgitation.    Pulmonary Artery: The estimated pulmonary artery systolic pressure is   51 mmHg.    IVC/SVC: Elevated venous pressure at 15 mmHg.    ECHO suggestive of restrictive cardiomyopathy with severe systolic   dysfunction and severe mitral regurgitation      and X-Ray: CXR: X-Ray Chest 1 View (CXR):     X-Ray Chest AP Portable  Order: [id number]  Status: Final result       Visible to patient: No (inaccessible in Patient Portal)       Next appt: None    0 Result Notes  Details    Reading Physician Reading Date Result Priority   Edwar Wilks DO  [phone] 1/16/2024 STAT     Narrative & Impression  EXAMINATION:  XR CHEST AP PORTABLE     CLINICAL HISTORY:  sob;     TECHNIQUE:  XR CHEST AP PORTABLE     COMPARISON:  01/15/2024     FINDINGS:  No lines or tubes.     Diffuse interstitial and patchy airspace opacities are similar     Bilateral pleural effusions are similar     Cardiac silhouette is similar to comparison exam.     No obvious acute bone findings.     Impression:     Diffuse interstitial and patchy airspace opacities are similar     Bilateral pleural effusions are similar        Electronically signed by: Edwar Wilks  Date:                                            01/16/2024  Time:                                           11:52     Assessment and Plan:     Brief HPI: 89 y/o female with no significant PMH (last seen by pcp a few years back before he retired) who presented to an outside hospital and was noted to be in acute respiratory distress we will with tachypnea, hypoxia with SpO2 85% and found to be in AFib with RVR rates to 150s with no hypotension. While at outside hospital she was started on non-rebreather  with course notable for progression in her respiratory distress with plan for transfer in-house for higher level of care.  While there, she received management including diltiazem 5 mg (dose interrupted due to concern for heart failure), digoxin 500 mcg, Lasix 20 mg IV, ticagrelor load 180 mg and transferred to our ED. On presentation here, she was noted to be in respiratory distress following CTA chest and was initiated on BiPAP.      Labs are notable for unremarkable CBC, creatinine 0.92, mild transaminitis with AST/ALT 43/73, NT pro BNP 45946, free T4 within normal limits at 1.05 and troponin flat x2 (104, 109).  Imaging with CTA chest notable for compressive atelectasis versus consolidation in right lower lobe and bilateral right greater than left pleural effusion, LA and RA dilation and no PE.      1/16/2024:  Cardiology consulted for atrial fibrillation with RVR, hypertensive urgency and suspected new onset heart failure with systolic dysfunction. Patient seen today, sitting up in chair, AAO x 3. States she lives at home alone in [place] and has no family close by, but does have a stepdaughter who lives in [place]. According to patient, she quit driving in December and has a friend who delivers her groceries. States she has still been cutting her grass with a riding  until it recently got cold. She appears to be a very functional 89 y/o until recently declining over the past month or so.     * Acute congestive heart failure  - Patient seen and evaluated by Dr. Escobar  - Echo with EF 25%, severe MR & TR, pulmonary hypertension, PASP 51mmHg; ECHO suggestive of restrictive cardiomyopathy with severe systolic dysfunction and severe mitral regurgitation  - Patient declines any invasive cardiac procedures at this time  - Continue medical management  - Start Entresto 24-26 BID: (changed to losartan due to soft BP)  - Lasix 40 IV x 1 dose today  - Continue monitoring    1/17/2024:  - Patient seen and  evaluated by Dr. Escobar  - Continue IV lasix 40 daily  - BP 130s/70s this afternoon; Increase Losartan to 50mg daily and Toprol XL to 50mg daily  - Discussed discharging home on Hospice with patient, given severity of heart failure with severe MR & TR and no family support; She is considering her options.  - Will continue to optimize medical therapy    1/18/2024:  - Patient seen and evaluated by Dr. Escobar  - Tolerating Losartan 50 daily and Toprol 50 daily    Severe mitral regurgitation  - Patient seen and evaluated by Dr. Escobar  - Severe MR & TR by echo  - Dr. Escobar had long discussion with patient at bedside and even though she is a very functional 89 y/o, at this time pt is not interested in any invasive procedures and wishes to medically manage  - Starting Entresto today for afterload reduction    1/17/2024:  - Entresto changed to Losartan yesterday due to soft BP  - BP stable today, increased Losartan to 50mg daily    1/18/2024:  - Tolerating Losartan 50    Type 2 MI (myocardial infarction)  - Patient seen and evaluated by Dr. Escobar  - Troponin 104, 109, 65; EKG afib with RVR, IVCD, and PVCs  - Echo with EF 25% and severe MR & TR  - Likely Type 2 MI/demand ischemia in setting of acute heart failure, acute respiratory failure, and afib rvr  - Patient declines any invasive cardiac workup at this time  - Continue medical management; asa, statin, BB, and lovenox 1mg/kg BID (plan to discharge on Eliquis if no contraindications)      Hypertensive urgency  - Better controlled today, continue Losartan for afterload reduction    Acute respiratory failure with hypoxia  - Being followed by primary medical team    Atrial fibrillation with rapid ventricular response  - Patient seen and evaluated by Dr. Escobar  - Rate better controlled on IV amiodarone, rate 90s-low 100s  - Started Toprol XL 25 daily  - Start lovenox 1mg/kg BID (plan to discharge home on Eliquis BID if no contraindications)  - Continue monitoring    1/17/2024:  -  Patient seen and evaluated by Dr. Escobar  - Rate controlled at rest, 90s (not as well controlled with activity)  - Discussed CRISTÓBAL cardioversion, pt would like to continue medical management for now, but may consider procedure if unable to control heart rate  - Transition to PO amiodarone 400mg BID x 1 week, then 200mg daily  - BP stable, increase Toprol XL to 50mg daily  - Consider Eliquis 5mg BID prior to discharge, continue Lovenox for now    1/18/2024:  - Patient seen and evaluated by Dr. Escobar  - Recommended CRISTÓBAL with cardioversion again today, patient declined  - Continue PO amiodarone and Toprol 50  - Consider Eliquis 5mg BID prior to discharge        VTE Risk Mitigation (From admission, onward)           Ordered     enoxaparin injection 70 mg  Every 12 hours         01/16/24 1525     IP VTE HIGH RISK PATIENT  Once         01/14/24 1751     Place sequential compression device  Until discontinued         01/14/24 1751                    ALVARO Diehl  Cardiology  [facility]

## 2024-01-18 NOTE — SUBJECTIVE & OBJECTIVE
Interval History: Patient seen and evaluated by Dr. Escobar, HR better controlled at rest, still afib rvr with activity.     Review of Systems   Constitutional: Positive for malaise/fatigue. Negative for chills and fever.   HENT: Negative.     Eyes: Negative.    Cardiovascular:  Positive for dyspnea on exertion, leg swelling, orthopnea and palpitations. Negative for chest pain.   Respiratory:  Positive for shortness of breath and sleep disturbances due to breathing.    Gastrointestinal:  Negative for abdominal pain, nausea and vomiting.   Genitourinary: Negative.    Neurological:  Positive for weakness.     Objective:     Vital Signs (Most Recent):  Temp: 98.1 °F (36.7 °C) (01/18/24 1215)  Pulse: 101 (01/18/24 1245)  Resp: (!) 33 (01/18/24 1245)  BP: 106/62 (01/18/24 1230)  SpO2: 96 % (01/18/24 1245) Vital Signs (24h Range):  Temp:  [98 °F (36.7 °C)-98.5 °F (36.9 °C)] 98.1 °F (36.7 °C)  Pulse:  [] 101  Resp:  [17-42] 33  SpO2:  [81 %-99 %] 96 %  BP: ()/(50-97) 106/62     Weight: 75 kg (165 lb 6.4 oz)  Body mass index is 26.7 kg/m².     SpO2: 96 %         Intake/Output Summary (Last 24 hours) at 1/18/2024 1614  Last data filed at 1/18/2024 1504  Gross per 24 hour   Intake --   Output 1600 ml   Net -1600 ml       Lines/Drains/Airways       Drain  Duration             Female External Urinary Catheter w/ Suction 01/17/24 1715 <1 day              Peripheral Intravenous Line  Duration                  Peripheral IV - Single Lumen 01/14/24 1800 20 G Anterior;Left Forearm 3 days         Peripheral IV - Single Lumen 01/16/24 1857 20 G Anterior;Distal;Right Upper Arm 1 day                       Physical Exam  Vitals reviewed.   Constitutional:       General: She is not in acute distress.  Cardiovascular:      Rate and Rhythm: Normal rate. Rhythm irregular.      Heart sounds: Murmur heard.   Pulmonary:      Effort: Pulmonary effort is normal.      Breath sounds: Rales present. No wheezing or rhonchi.   Abdominal:       "General: Bowel sounds are normal.      Palpations: Abdomen is soft.   Musculoskeletal:      Right lower leg: Edema present.      Left lower leg: Edema present.   Skin:     General: Skin is cool and dry.   Neurological:      Mental Status: She is alert and oriented to person, place, and time.            Significant Labs: ABG: No results for input(s): "PH", "PCO2", "HCO3", "POCSATURATED", "BE" in the last 48 hours., Blood Culture: No results for input(s): "LABBLOO" in the last 48 hours., BMP:   Recent Labs   Lab 01/17/24  0535 01/18/24  0503   * 104   * 131*   K 4.4 4.3   CL 97* 99   CO2 33* 30   BUN 22* 20*   CREATININE 0.84 0.78   CALCIUM 9.3 9.2   MG 2.3 2.4*   , CMP   Recent Labs   Lab 01/17/24  0535 01/18/24  0503   * 131*   K 4.4 4.3   CL 97* 99   CO2 33* 30   * 104   BUN 22* 20*   CREATININE 0.84 0.78   CALCIUM 9.3 9.2   PROT 6.3*  --    ANIONGAP 4* 6*   , CBC   Recent Labs   Lab 01/17/24  0535 01/18/24  0503   WBC 7.25 7.79   HGB 13.0 13.8   HCT 39.5 42.5    211   , INR No results for input(s): "INR", "PROTIME" in the last 48 hours., Lipid Panel No results for input(s): "CHOL", "HDL", "LDLCALC", "TRIG", "CHOLHDL" in the last 48 hours., and Troponin No results for input(s): "TROPONINI" in the last 48 hours.    Significant Imaging: Echocardiogram: Transthoracic echo (TTE) complete (Cupid Only):   Results for orders placed or performed during the hospital encounter of 01/14/24   Echo   Result Value Ref Range    BSA 1.81 m2    LVOT stroke volume 62.42 cm3    LVIDd 4.30 3.5 - 6.0 cm    LV Systolic Volume 55.08 mL    LV Systolic Volume Index 30.8 mL/m2    LVIDs 3.62 2.1 - 4.0 cm    LV Diastolic Volume 82.92 mL    LV Diastolic Volume Index 46.32 mL/m2    IVS 1.48 (A) 0.6 - 1.1 cm    LVOT diameter 1.96 cm    LVOT area 3.0 cm2    FS 16 (A) 28 - 44 %    Left Ventricle Relative Wall Thickness 0.38 cm    Posterior Wall 0.82 0.6 - 1.1 cm    LV mass 173.65 g    LV Mass Index 97 g/m2    TR " Max Florentino 2.99 m/s    LVOT peak florentino 0.98 m/s    Left Ventricular Outflow Tract Mean Velocity 0.58 cm/s    Left Ventricular Outflow Tract Mean Gradient 1.65 mmHg    Right ventricular length in diastole (apical 4-chamber view) 6.45 cm    RV mid diameter 2.27 cm    TAPSE 1.49 cm    LA size 4.8 cm    RA area 22.0 cm2    AV mean gradient 5 mmHg    AV peak gradient 8 mmHg    Ao peak florentino 1.44 m/s    Ao VTI 29.60 cm    LVOT peak VTI 20.70 cm    AV valve area 2.11 cm²    AV Velocity Ratio 0.68     AV index (prosthetic) 0.70     PRIYANK by Velocity Ratio 2.05 cm²    Triscuspid Valve Regurgitation Peak Gradient 36 mmHg    PV PEAK VELOCITY 0.82 m/s    PV peak gradient 3 mmHg    Ao root annulus 2.60 cm    IVC diameter 2.11 cm    ZLVIDS 1.33     ZLVIDD -1.40     RVDD 3.50 cm    TV resting pulmonary artery pressure 51 mmHg    RV TB RVSP 18 mmHg    Est. RA pres 15 mmHg    Guerrero's Biplane MOD Ejection Fraction 24 %    Narrative      Left Ventricle: The left ventricle is normal in size. Normal wall   thickness. There is eccentric hypertrophy. Regional wall motion   abnormalities present. Anterior and amy-septal wall appears akinetic   There is severely reduced systolic function with a visually estimated   ejection fraction of 20 - 25%. Biplane (2D) method of discs ejection   fraction is 24%. Unable to assess diastolic function due to atrial   fibrillation.    Right Ventricle: Normal right ventricular cavity size. Systolic   function is normal.    Left Atrium: Left atrium is severely dilated.    Right Atrium: Right atrium is severely dilated.    Aortic Valve: The aortic valve is a trileaflet valve.    Mitral Valve: There is posterior leaflet sclerosis. There is annular   dilation present. There is severe regurgitation.    Tricuspid Valve: There is severe regurgitation.    Pulmonic Valve: There is mild regurgitation.    Pulmonary Artery: The estimated pulmonary artery systolic pressure is   51 mmHg.    IVC/SVC: Elevated venous pressure  at 15 mmHg.    ECHO suggestive of restrictive cardiomyopathy with severe systolic   dysfunction and severe mitral regurgitation      and X-Ray: CXR: X-Ray Chest 1 View (CXR):     X-Ray Chest AP Portable  Order: [id number]  Status: Final result       Visible to patient: No (inaccessible in Patient Portal)       Next appt: None    0 Result Notes  Details    Reading Physician Reading Date Result Priority   Edwar Wilks DO  [phone] 1/16/2024 STAT     Narrative & Impression  EXAMINATION:  XR CHEST AP PORTABLE     CLINICAL HISTORY:  sob;     TECHNIQUE:  XR CHEST AP PORTABLE     COMPARISON:  01/15/2024     FINDINGS:  No lines or tubes.     Diffuse interstitial and patchy airspace opacities are similar     Bilateral pleural effusions are similar     Cardiac silhouette is similar to comparison exam.     No obvious acute bone findings.     Impression:     Diffuse interstitial and patchy airspace opacities are similar     Bilateral pleural effusions are similar        Electronically signed by: Edwar Wilks  Date:                                            01/16/2024  Time:                                           11:52

## 2024-01-18 NOTE — PLAN OF CARE
[facility] ICU  Initial Discharge Assessment       Primary Care Provider: No, Primary Doctor    Admission Diagnosis: Malignant hypertension [I10]  Pleural effusion [J90]  Atrial fibrillation with rapid ventricular response [I48.91]  Acute respiratory failure with hypoxia [J96.01]  Atrial fibrillation with RVR [I48.91]  Pneumonia of right lung due to infectious organism, unspecified part of lung [J18.9]  Acute congestive heart failure, unspecified heart failure type [I50.9]    Admission Date: 1/14/2024  Expected Discharge Date:     Transition of Care Barriers: None    Payor: MEDICARE / Plan: MEDICARE PART A & B / Product Type: Government /     Extended Emergency Contact Information  Primary Emergency Contact: Marilee King  Mobile Phone: [phone]  Relation: Friend  Preferred language: English   needed? No    Discharge Plan A: Hospice/home  Discharge Plan B: Hospice/home    No Pharmacies Listed    Initial Assessment (most recent)       Adult Discharge Assessment - 01/18/24 1000          Discharge Assessment    Assessment Type Discharge Planning Assessment     Confirmed/corrected address, phone number and insurance Yes     Confirmed Demographics Correct on Facesheet     Source of Information patient     Communicated SELWYN with patient/caregiver Date not available/Unable to determine     People in Home alone     Do you expect to return to your current living situation? Yes     Do you have help at home or someone to help you manage your care at home? No     Prior to hospitilization cognitive status: Unable to Assess     Current cognitive status: Alert/Oriented     Equipment Currently Used at Home none     Readmission within 30 days? No     Patient currently being followed by outpatient case management? No     Do you take prescription medications? Yes     Do you have prescription coverage? Yes     Do you have any problems affording any of your prescribed medications? No     Is the patient  taking medications as prescribed? yes     How do you get to doctors appointments? health plan transportation;family or friend will provide     Are you on dialysis? No     Do you take coumadin? No     Discharge Plan A Hospice/home     Discharge Plan B Hospice/home     DME Needed Upon Discharge  none     Discharge Plan discussed with: Patient     Transition of Care Barriers None                   Spoke with pt in room. Pt lives home alone,  plans to return at DC. Per IDT meeting pt may plan to dc home with hospice, pt denies DME or HH, will follow consults and DC needs as arise..

## 2024-01-19 LAB
ANION GAP SERPL CALCULATED.3IONS-SCNC: 5 MMOL/L (ref 7–16)
BASOPHILS # BLD AUTO: 0.11 K/UL (ref 0–0.2)
BASOPHILS NFR BLD AUTO: 1.4 % (ref 0–1)
BUN SERPL-MCNC: 19 MG/DL (ref 7–18)
BUN/CREAT SERPL: 21 (ref 6–20)
CALCIUM SERPL-MCNC: 9.2 MG/DL (ref 8.5–10.1)
CHLORIDE SERPL-SCNC: 100 MMOL/L (ref 98–107)
CO2 SERPL-SCNC: 35 MMOL/L (ref 21–32)
CREAT SERPL-MCNC: 0.91 MG/DL (ref 0.55–1.02)
DIFFERENTIAL METHOD BLD: ABNORMAL
EGFR (NO RACE VARIABLE) (RUSH/TITUS): 60 ML/MIN/1.73M2
EOSINOPHIL # BLD AUTO: 0.19 K/UL (ref 0–0.5)
EOSINOPHIL NFR BLD AUTO: 2.5 % (ref 1–4)
ERYTHROCYTE [DISTWIDTH] IN BLOOD BY AUTOMATED COUNT: 12.6 % (ref 11.5–14.5)
GLUCOSE SERPL-MCNC: 100 MG/DL (ref 74–106)
GLUCOSE SERPL-MCNC: 114 MG/DL (ref 70–105)
GLUCOSE SERPL-MCNC: 155 MG/DL (ref 70–105)
GLUCOSE SERPL-MCNC: 192 MG/DL (ref 70–105)
GLUCOSE SERPL-MCNC: 343 MG/DL (ref 70–105)
HCT VFR BLD AUTO: 40.6 % (ref 38–47)
HGB BLD-MCNC: 13.1 G/DL (ref 12–16)
IMM GRANULOCYTES # BLD AUTO: 0.04 K/UL (ref 0–0.04)
IMM GRANULOCYTES NFR BLD: 0.5 % (ref 0–0.4)
LYMPHOCYTES # BLD AUTO: 1.51 K/UL (ref 1–4.8)
LYMPHOCYTES NFR BLD AUTO: 19.8 % (ref 27–41)
MAGNESIUM SERPL-MCNC: 2.7 MG/DL (ref 1.7–2.3)
MCH RBC QN AUTO: 30.4 PG (ref 27–31)
MCHC RBC AUTO-ENTMCNC: 32.3 G/DL (ref 32–36)
MCV RBC AUTO: 94.2 FL (ref 80–96)
MONOCYTES # BLD AUTO: 0.62 K/UL (ref 0–0.8)
MONOCYTES NFR BLD AUTO: 8.1 % (ref 2–6)
MPC BLD CALC-MCNC: 10.6 FL (ref 9.4–12.4)
NEUTROPHILS # BLD AUTO: 5.14 K/UL (ref 1.8–7.7)
NEUTROPHILS NFR BLD AUTO: 67.7 % (ref 53–65)
NRBC # BLD AUTO: 0 X10E3/UL
NRBC, AUTO (.00): 0 %
PLATELET # BLD AUTO: 256 K/UL (ref 150–400)
POTASSIUM SERPL-SCNC: 4.2 MMOL/L (ref 3.5–5.1)
RBC # BLD AUTO: 4.31 M/UL (ref 4.2–5.4)
SODIUM SERPL-SCNC: 136 MMOL/L (ref 136–145)
WBC # BLD AUTO: 7.61 K/UL (ref 4.5–11)

## 2024-01-19 PROCEDURE — 63600175 PHARM REV CODE 636 W HCPCS: Performed by: STUDENT IN AN ORGANIZED HEALTH CARE EDUCATION/TRAINING PROGRAM

## 2024-01-19 PROCEDURE — 27000221 HC OXYGEN, UP TO 24 HOURS

## 2024-01-19 PROCEDURE — 82962 GLUCOSE BLOOD TEST: CPT

## 2024-01-19 PROCEDURE — 25000003 PHARM REV CODE 250: Performed by: INTERNAL MEDICINE

## 2024-01-19 PROCEDURE — 80048 BASIC METABOLIC PNL TOTAL CA: CPT | Performed by: STUDENT IN AN ORGANIZED HEALTH CARE EDUCATION/TRAINING PROGRAM

## 2024-01-19 PROCEDURE — 63600175 PHARM REV CODE 636 W HCPCS: Performed by: NURSE PRACTITIONER

## 2024-01-19 PROCEDURE — 85025 COMPLETE CBC W/AUTO DIFF WBC: CPT | Performed by: STUDENT IN AN ORGANIZED HEALTH CARE EDUCATION/TRAINING PROGRAM

## 2024-01-19 PROCEDURE — 99291 CRITICAL CARE FIRST HOUR: CPT | Mod: ,,, | Performed by: NURSE PRACTITIONER

## 2024-01-19 PROCEDURE — 99900035 HC TECH TIME PER 15 MIN (STAT)

## 2024-01-19 PROCEDURE — 11000001 HC ACUTE MED/SURG PRIVATE ROOM

## 2024-01-19 PROCEDURE — 94761 N-INVAS EAR/PLS OXIMETRY MLT: CPT

## 2024-01-19 PROCEDURE — 25000003 PHARM REV CODE 250: Performed by: NURSE PRACTITIONER

## 2024-01-19 PROCEDURE — 83735 ASSAY OF MAGNESIUM: CPT | Performed by: STUDENT IN AN ORGANIZED HEALTH CARE EDUCATION/TRAINING PROGRAM

## 2024-01-19 RX ORDER — OXYMETAZOLINE HCL 0.05 %
2 SPRAY, NON-AEROSOL (ML) NASAL 2 TIMES DAILY
Status: COMPLETED | OUTPATIENT
Start: 2024-01-19 | End: 2024-01-22

## 2024-01-19 RX ADMIN — ENOXAPARIN SODIUM 70 MG: 80 INJECTION SUBCUTANEOUS at 08:01

## 2024-01-19 RX ADMIN — AMIODARONE HYDROCHLORIDE 400 MG: 200 TABLET ORAL at 09:01

## 2024-01-19 RX ADMIN — LOSARTAN POTASSIUM 50 MG: 50 TABLET, FILM COATED ORAL at 09:01

## 2024-01-19 RX ADMIN — METOPROLOL SUCCINATE 50 MG: 50 TABLET, EXTENDED RELEASE ORAL at 09:01

## 2024-01-19 RX ADMIN — MUPIROCIN: 20 OINTMENT TOPICAL at 09:01

## 2024-01-19 RX ADMIN — AMIODARONE HYDROCHLORIDE 400 MG: 200 TABLET ORAL at 08:01

## 2024-01-19 RX ADMIN — TRAZODONE HYDROCHLORIDE 25 MG: 50 TABLET ORAL at 08:01

## 2024-01-19 RX ADMIN — ASPIRIN 81 MG: 81 TABLET, COATED ORAL at 09:01

## 2024-01-19 RX ADMIN — MUPIROCIN: 20 OINTMENT TOPICAL at 08:01

## 2024-01-19 RX ADMIN — OXYMETAZOLINE HYDROCHLORIDE 2 SPRAY: 0.05 SPRAY NASAL at 11:01

## 2024-01-19 RX ADMIN — ENOXAPARIN SODIUM 70 MG: 80 INJECTION SUBCUTANEOUS at 09:01

## 2024-01-19 RX ADMIN — ATORVASTATIN CALCIUM 20 MG: 20 TABLET, FILM COATED ORAL at 08:01

## 2024-01-19 RX ADMIN — FUROSEMIDE 40 MG: 10 INJECTION, SOLUTION INTRAVENOUS at 09:01

## 2024-01-19 RX ADMIN — INSULIN ASPART 4 UNITS: 100 INJECTION, SOLUTION INTRAVENOUS; SUBCUTANEOUS at 11:01

## 2024-01-19 NOTE — PROGRESS NOTES
[facility]  Critical Care Medicine  Progress Note    Patient Name: Windy Swann  MRN: [id number]  Admission Date: 1/14/2024  Hospital Length of Stay: 4 days  Code Status: DNR  Attending Provider: Lester Escobar MD  Primary Care Provider: Alyssa, Primary Doctor   Principal Problem: Acute congestive heart failure    Subjective:     HPI:  89 yo F with no significant medical history, albeit she has not established with any care, presented via OSH with complaints of 3 day history of progressive shortness of breath associated with chest discomfort and found to be in AFib with RVR with transfer in-house and thereafter admission to the ICU for management of severe respiratory distress and AFib with RVR.      On presentation to outside hospital she was noted to be in acute respiratory distress we will with tachypnea, hypoxia with SpO2 85% and found to be in AFib with RVR rates to 150s with no hypotension. While at outside hospital she was started on non-rebreather with course notable for progression in her respiratory distress with plan for transfer in-house for higher level of care.  While there, she received management including diltiazem 5 mg (dose interrupted due to concern for heart failure), digoxin 500 mcg, Lasix 20 mg IV, ticagrelor load 180 mg with transfer thereafter to our ED. on presentation here, she was noted to be in respiratory distress following CTA chest and was initiated on BiPAP.  At time of my assessment, she reports having improvement in her dyspnea with persistent tachypnea.  She is able to speak in full sentences and prefers to communicate by writing down on paper.    At end of my assessment, HR 140s in irregular rhythm, on NIV with SpO2 greater than 95%, BP 150s to 160s over 90s to 110s.  Labs are notable for unremarkable CBC, creatinine 0.92, mild transaminitis with AST/ALT 43/73, NT pro BNP 45868, free T4 within normal limits at 1.05 and troponin flat x2 (104, 109).  ABG reviewed  with the P/F of 142.  Imaging with CTA chest notable for compressive atelectasis versus consolidation in right lower lobe and bilateral right greater than left pleural effusion, LA and RA dilation and no PE.  Cardiology was consulted during ED course with initiation of a nitroglycerin drip for management of hypertensive urgency.    Hospital/ICU Course:    1/15/24-came off BiPAP therapy and tolerated well on nasal cannula.  1/16/24-off nitro drip overnight, doing well on amiodarone.  Echocardiogram with reduced ejection fraction and severe mitral regurgitation  1/17/24-continued on diuresis, stable on nasal cannula, did not require positive airway pressure during the day today.  1/18/24- stable in terms of atrial fibrillation to    Interval History/Significant Events:  no acute events    Review of Systems  Objective:     Vital Signs (Most Recent):  Temp: 98 °F (36.7 °C) (01/18/24 1909)  Pulse: 90 (01/18/24 1730)  Resp: (!) 32 (01/18/24 1730)  BP: 113/65 (01/18/24 1730)  SpO2: (!) 94 % (01/18/24 1730) Vital Signs (24h Range):  Temp:  [97.7 °F (36.5 °C)-98.5 °F (36.9 °C)] 98 °F (36.7 °C)  Pulse:  [] 90  Resp:  [17-38] 32  SpO2:  [81 %-99 %] 94 %  BP: ()/(48-96) 113/65   Weight: 75 kg (165 lb 6.4 oz)  Body mass index is 26.7 kg/m².      Intake/Output Summary (Last 24 hours) at 1/18/2024 2007  Last data filed at 1/18/2024 1829  Gross per 24 hour   Intake --   Output 1550 ml   Net -1550 ml          Physical Exam  Constitutional:       Appearance: Normal appearance.      Comments: Seated upright with bed at 90°   HENT:      Head: Normocephalic and atraumatic.   Eyes:      General: No scleral icterus.  Cardiovascular:      Rate and Rhythm: Tachycardia present. Rhythm irregular.      Heart sounds: Murmur (Grade 2/6 systolic) heard.   Pulmonary:      Breath sounds: Rales present. No rhonchi.   Abdominal:      Palpations: Abdomen is soft.      Tenderness: There is no abdominal tenderness. There is no guarding.    Musculoskeletal:      Cervical back: Normal range of motion.      Right lower leg: No edema.      Left lower leg: No edema.   Skin:     General: Skin is warm.      Capillary Refill: Capillary refill takes less than 2 seconds.      Coloration: Skin is not jaundiced.   Neurological:      General: No focal deficit present.      Mental Status: She is alert. Mental status is at baseline.            Vents:  Oxygen Concentration (%): 36 (01/17/24 2000)  Lines/Drains/Airways       Drain  Duration             Female External Urinary Catheter w/ Suction 01/17/24 1715 1 day              Peripheral Intravenous Line  Duration                  Peripheral IV - Single Lumen 01/14/24 1800 20 G Anterior;Left Forearm 4 days         Peripheral IV - Single Lumen 01/16/24 1857 20 G Anterior;Distal;Right Upper Arm 2 days                  Significant Labs:    CBC/Anemia Profile:  Recent Labs   Lab 01/17/24  0535 01/18/24  0503   WBC 7.25 7.79   HGB 13.0 13.8   HCT 39.5 42.5    211   MCV 91.6 94.0   RDW 12.7 12.9        Chemistries:  Recent Labs   Lab 01/17/24  0535 01/18/24  0503   * 131*   K 4.4 4.3   CL 97* 99   CO2 33* 30   BUN 22* 20*   CREATININE 0.84 0.78   CALCIUM 9.3 9.2   PROT 6.3*  --    MG 2.3 2.4*       All pertinent labs within the past 24 hours have been reviewed.    Significant Imaging:  I have reviewed all pertinent imaging results/findings within the past 24 hours.    ABG  Recent Labs   Lab 01/14/24  1444   PH 7.38   PO2 57*   PCO2 46   HCO3 27.2     Assessment/Plan:     Pulmonary  Acute respiratory failure with hypoxia  Presenting in acute respiratory distress recovering upon initiation of NIV in this patient with acute heart failure exacerbation with flash pulmonary edema.  ABG reviewed.  Improved work of breathing while on NIV continued improvement in nasal cannula today.      .    -   Now nasal cannula, saturating well  - titrate FiO2 for goal SpO2 >92%  1/18/24-continues to require nasal cannula, may  require oxygen evaluation for home oxygen prior to discharge      Cardiac/Vascular  * Acute congestive heart failure  No prior known cardiac history presenting with evidence of decompensated heart failure with bilateral pleural effusion and respiratory distress. CT imaging with dilated RA and LA and no evidence of VTE including no clot in transit.  Initial management has included Lasix IV.  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  Medication optimization as per Cardiology recommendations.    Type 2 MI (myocardial infarction)   Troponin down trended after being relatively flat, patient with no chest pain.  Did not require systemic anticoagulation with heparin drip.  Stated that she has had no chest pain.    Hypertensive urgency  RESOLVED    Presenting with hypertensive urgency characterize with respiratory distress and arrhythmia.  CTA chest with no evidence of acute aortic pathology.  No renal dysfunction on BNP.  No active chest pain following initiation of nitroglycerin drip in ED  .  Off nitro drip.    Medication management as documented, can remain off nitroglycerin drip.    Atrial fibrillation with rapid ventricular response  AFib with RVR on presentation.  Management thus far has included digoxin and amiodarone push.      Continuing  amiodarone drip, initiated on anticoagulation by Cardiology and we will continue at this time with close monitoring of her renal function.  Plan to discharge on oral anticoagulation.         Lester Escobar MD  Critical Care Medicine  [facility] ICU

## 2024-01-19 NOTE — ASSESSMENT & PLAN NOTE
AFib with RVR on presentation.  Management thus far has included digoxin and amiodarone push.      Continuing amiodarone drip, initiated on anticoagulation by Cardiology and we will continue at this time with close monitoring of her renal function.  Plan to discharge on oral anticoagulation.    - has been transitioned to PO amoidarone, HR controlled today

## 2024-01-19 NOTE — SUBJECTIVE & OBJECTIVE
Interval History/Significant Events: Pt resting in bed. NAEO. Denies any needs or complaints at present.     Review of Systems   All other systems reviewed and are negative.    Objective:     Vital Signs (Most Recent):  Temp: 97.9 °F (36.6 °C) (01/19/24 1546)  Pulse: 109 (01/19/24 1415)  Resp: (!) 35 (01/19/24 1415)  BP: 108/64 (01/19/24 1400)  SpO2: (!) 91 % (01/19/24 1415) Vital Signs (24h Range):  Temp:  [97.6 °F (36.4 °C)-98.2 °F (36.8 °C)] 97.9 °F (36.6 °C)  Pulse:  [] 109  Resp:  [16-39] 35  SpO2:  [91 %-98 %] 91 %  BP: ()/(42-93) 108/64   Weight: 74.6 kg (164 lb 7.4 oz)  Body mass index is 26.55 kg/m².      Intake/Output Summary (Last 24 hours) at 1/19/2024 1702  Last data filed at 1/19/2024 1141  Gross per 24 hour   Intake --   Output 1300 ml   Net -1300 ml          Physical Exam  Vitals and nursing note reviewed.   Constitutional:       General: She is not in acute distress.     Appearance: Normal appearance. She is not ill-appearing.   HENT:      Head: Normocephalic and atraumatic.   Eyes:      General: No scleral icterus.  Cardiovascular:      Rate and Rhythm: Tachycardia present. Rhythm irregular.      Heart sounds: Murmur (Grade 2/6 systolic) heard.   Pulmonary:      Breath sounds: Rales present. No rhonchi.   Abdominal:      Palpations: Abdomen is soft.      Tenderness: There is no abdominal tenderness. There is no guarding.   Musculoskeletal:      Cervical back: Normal range of motion.      Right lower leg: No edema.      Left lower leg: No edema.   Skin:     General: Skin is warm.      Capillary Refill: Capillary refill takes less than 2 seconds.      Coloration: Skin is not jaundiced.   Neurological:      General: No focal deficit present.      Mental Status: She is alert. Mental status is at baseline.            Vents:  Oxygen Concentration (%): 32 (01/19/24 0745)  Lines/Drains/Airways       Drain  Duration             Female External Urinary Catheter w/ Suction 01/17/24 1715 1 day               Peripheral Intravenous Line  Duration                  Peripheral IV - Single Lumen 01/14/24 1800 20 G Anterior;Left Forearm 4 days         Peripheral IV - Single Lumen 01/16/24 1857 20 G Anterior;Distal;Right Upper Arm 2 days                  Significant Labs:    CBC/Anemia Profile:  Recent Labs   Lab 01/18/24  0503 01/19/24  0454   WBC 7.79 7.61   HGB 13.8 13.1   HCT 42.5 40.6    256   MCV 94.0 94.2   RDW 12.9 12.6        Chemistries:  Recent Labs   Lab 01/18/24  0503 01/19/24  0453   * 136   K 4.3 4.2   CL 99 100   CO2 30 35*   BUN 20* 19*   CREATININE 0.78 0.91   CALCIUM 9.2 9.2   MG 2.4* 2.7*       All pertinent labs within the past 24 hours have been reviewed.    Significant Imaging:  I have reviewed all pertinent imaging results/findings within the past 24 hours.  Imaging Results              CTA Chest Non-Coronary (PE Studies) (Final result)  Result time 01/14/24 16:27:26      Final result by Robert Waller MD (01/14/24 16:27:26)                   Impression:      No evidence of acute pulmonary embolic disease    Pulmonary edema with or without superimposed right basilar pneumonia    Moderate layering pleural effusion bilaterally.      Electronically signed by: Robert Waller  Date:    01/14/2024  Time:    16:27               Narrative:    EXAMINATION:  CTA CHEST NON CORONARY (PE STUDIES)    CLINICAL HISTORY:  Pulmonary embolism (PE) suspected, positive D-dimer;.    COMPARISON:  No previous chest CT    TECHNIQUE:  Thin spiral CT sections were obtained through the neck during the dynamic IV administration of 100 ml of Isovue 370.  In addition to multiplanar reconstruction images, 3-D images were also generated, archived, and analyzed.    The CT examination was performed using one or more of the following dose reduction techniques: Automated exposure control, adjustment of the mA and kV according to patient's size, use of acute or iterative reconstruction  techniques.    FINDINGS:  There is no discrete filling defect within the pulmonary arterial tree to specifically suggest acute pulmonary embolic disease.    There is no thoracic aorta aneurysm or dissection.  There is a moderate atherosclerotic irregularity and calcification of the aortic arch.    There is no mediastinal mass or mediastinal lymphadenopathy.    There is moderate layering pleural effusion bilaterally.    There is interstitial edema in either lung.  There is some asymmetric patchy airspace disease in the right lower lobe and asymmetric patchy and strandy opacity right middle lobe.  There is passive atelectasis in the lower lobes bilaterally.    There is mild cardiomegaly.    There is no definite acute process in the partially visualized upper abdomen.    There is scattered mild degenerative disc disease of the thoracic spine.

## 2024-01-19 NOTE — SUBJECTIVE & OBJECTIVE
Interval History/Significant Events:  no acute events    Review of Systems  Objective:     Vital Signs (Most Recent):  Temp: 98 °F (36.7 °C) (01/18/24 1909)  Pulse: 90 (01/18/24 1730)  Resp: (!) 32 (01/18/24 1730)  BP: 113/65 (01/18/24 1730)  SpO2: (!) 94 % (01/18/24 1730) Vital Signs (24h Range):  Temp:  [97.7 °F (36.5 °C)-98.5 °F (36.9 °C)] 98 °F (36.7 °C)  Pulse:  [] 90  Resp:  [17-38] 32  SpO2:  [81 %-99 %] 94 %  BP: ()/(48-96) 113/65   Weight: 75 kg (165 lb 6.4 oz)  Body mass index is 26.7 kg/m².      Intake/Output Summary (Last 24 hours) at 1/18/2024 2007  Last data filed at 1/18/2024 1829  Gross per 24 hour   Intake --   Output 1550 ml   Net -1550 ml          Physical Exam  Constitutional:       Appearance: Normal appearance.      Comments: Seated upright with bed at 90°   HENT:      Head: Normocephalic and atraumatic.   Eyes:      General: No scleral icterus.  Cardiovascular:      Rate and Rhythm: Tachycardia present. Rhythm irregular.      Heart sounds: Murmur (Grade 2/6 systolic) heard.   Pulmonary:      Breath sounds: Rales present. No rhonchi.   Abdominal:      Palpations: Abdomen is soft.      Tenderness: There is no abdominal tenderness. There is no guarding.   Musculoskeletal:      Cervical back: Normal range of motion.      Right lower leg: No edema.      Left lower leg: No edema.   Skin:     General: Skin is warm.      Capillary Refill: Capillary refill takes less than 2 seconds.      Coloration: Skin is not jaundiced.   Neurological:      General: No focal deficit present.      Mental Status: She is alert. Mental status is at baseline.            Vents:  Oxygen Concentration (%): 36 (01/17/24 2000)  Lines/Drains/Airways       Drain  Duration             Female External Urinary Catheter w/ Suction 01/17/24 1715 1 day              Peripheral Intravenous Line  Duration                  Peripheral IV - Single Lumen 01/14/24 1800 20 G Anterior;Left Forearm 4 days         Peripheral IV -  Single Lumen 01/16/24 1857 20 G Anterior;Distal;Right Upper Arm 2 days                  Significant Labs:    CBC/Anemia Profile:  Recent Labs   Lab 01/17/24  0535 01/18/24  0503   WBC 7.25 7.79   HGB 13.0 13.8   HCT 39.5 42.5    211   MCV 91.6 94.0   RDW 12.7 12.9        Chemistries:  Recent Labs   Lab 01/17/24  0535 01/18/24  0503   * 131*   K 4.4 4.3   CL 97* 99   CO2 33* 30   BUN 22* 20*   CREATININE 0.84 0.78   CALCIUM 9.3 9.2   PROT 6.3*  --    MG 2.3 2.4*       All pertinent labs within the past 24 hours have been reviewed.    Significant Imaging:  I have reviewed all pertinent imaging results/findings within the past 24 hours.

## 2024-01-19 NOTE — ASSESSMENT & PLAN NOTE
Presenting in acute respiratory distress recovering upon initiation of NIV in this patient with acute heart failure exacerbation with flash pulmonary edema.  ABG reviewed.  Improved work of breathing while on NIV continued improvement in nasal cannula today.      .    -   Now nasal cannula, saturating well  - titrate FiO2 for goal SpO2 >92%    - remains stable with NC

## 2024-01-19 NOTE — PROGRESS NOTES
[facility] ICU  Critical Care Medicine  Progress Note    Patient Name: Windy Swann  MRN: [id number]  Admission Date: 1/14/2024  Hospital Length of Stay: 5 days  Code Status: DNR  Attending Provider: Lester Escobar MD  Primary Care Provider: Alyssa, Primary Doctor   Principal Problem: Acute congestive heart failure    Subjective:     HPI:  89 yo F with no significant medical history, albeit she has not established with any care, presented via OSH with complaints of 3 day history of progressive shortness of breath associated with chest discomfort and found to be in AFib with RVR with transfer in-house and thereafter admission to the ICU for management of severe respiratory distress and AFib with RVR.      On presentation to outside hospital she was noted to be in acute respiratory distress we will with tachypnea, hypoxia with SpO2 85% and found to be in AFib with RVR rates to 150s with no hypotension. While at outside hospital she was started on non-rebreather with course notable for progression in her respiratory distress with plan for transfer in-house for higher level of care.  While there, she received management including diltiazem 5 mg (dose interrupted due to concern for heart failure), digoxin 500 mcg, Lasix 20 mg IV, ticagrelor load 180 mg with transfer thereafter to our ED. on presentation here, she was noted to be in respiratory distress following CTA chest and was initiated on BiPAP.  At time of my assessment, she reports having improvement in her dyspnea with persistent tachypnea.  She is able to speak in full sentences and prefers to communicate by writing down on paper.    At end of my assessment, HR 140s in irregular rhythm, on NIV with SpO2 greater than 95%, BP 150s to 160s over 90s to 110s.  Labs are notable for unremarkable CBC, creatinine 0.92, mild transaminitis with AST/ALT 43/73, NT pro BNP 88059, free T4 within normal limits at 1.05 and troponin flat x2 (104, 109).  ABG reviewed  with the P/F of 142.  Imaging with CTA chest notable for compressive atelectasis versus consolidation in right lower lobe and bilateral right greater than left pleural effusion, LA and RA dilation and no PE.  Cardiology was consulted during ED course with initiation of a nitroglycerin drip for management of hypertensive urgency.    Hospital/ICU Course:    1/15/24-came off BiPAP therapy and tolerated well on nasal cannula.  1/16/24-off nitro drip overnight, doing well on amiodarone.  Echocardiogram with reduced ejection fraction and severe mitral regurgitation  1/17/24-continued on diuresis, stable on nasal cannula, did not require positive airway pressure during the day today.  1/18/24- stable in terms of atrial fibrillation  1/19- oxygenating well; HR controlled with current regimen; BP has been stable; will transfer to floor when bed available.     Interval History/Significant Events: Pt resting in bed. NAEO. Denies any needs or complaints at present.     Review of Systems   All other systems reviewed and are negative.    Objective:     Vital Signs (Most Recent):  Temp: 97.9 °F (36.6 °C) (01/19/24 1546)  Pulse: 109 (01/19/24 1415)  Resp: (!) 35 (01/19/24 1415)  BP: 108/64 (01/19/24 1400)  SpO2: (!) 91 % (01/19/24 1415) Vital Signs (24h Range):  Temp:  [97.6 °F (36.4 °C)-98.2 °F (36.8 °C)] 97.9 °F (36.6 °C)  Pulse:  [] 109  Resp:  [16-39] 35  SpO2:  [91 %-98 %] 91 %  BP: ()/(42-93) 108/64   Weight: 74.6 kg (164 lb 7.4 oz)  Body mass index is 26.55 kg/m².      Intake/Output Summary (Last 24 hours) at 1/19/2024 1702  Last data filed at 1/19/2024 1141  Gross per 24 hour   Intake --   Output 1300 ml   Net -1300 ml          Physical Exam  Vitals and nursing note reviewed.   Constitutional:       General: She is not in acute distress.     Appearance: Normal appearance. She is not ill-appearing.   HENT:      Head: Normocephalic and atraumatic.   Eyes:      General: No scleral icterus.  Cardiovascular:       Rate and Rhythm: Tachycardia present. Rhythm irregular.      Heart sounds: Murmur (Grade 2/6 systolic) heard.   Pulmonary:      Breath sounds: Rales present. No rhonchi.   Abdominal:      Palpations: Abdomen is soft.      Tenderness: There is no abdominal tenderness. There is no guarding.   Musculoskeletal:      Cervical back: Normal range of motion.      Right lower leg: No edema.      Left lower leg: No edema.   Skin:     General: Skin is warm.      Capillary Refill: Capillary refill takes less than 2 seconds.      Coloration: Skin is not jaundiced.   Neurological:      General: No focal deficit present.      Mental Status: She is alert. Mental status is at baseline.            Vents:  Oxygen Concentration (%): 32 (01/19/24 0745)  Lines/Drains/Airways       Drain  Duration             Female External Urinary Catheter w/ Suction 01/17/24 1715 1 day              Peripheral Intravenous Line  Duration                  Peripheral IV - Single Lumen 01/14/24 1800 20 G Anterior;Left Forearm 4 days         Peripheral IV - Single Lumen 01/16/24 1857 20 G Anterior;Distal;Right Upper Arm 2 days                  Significant Labs:    CBC/Anemia Profile:  Recent Labs   Lab 01/18/24  0503 01/19/24  0454   WBC 7.79 7.61   HGB 13.8 13.1   HCT 42.5 40.6    256   MCV 94.0 94.2   RDW 12.9 12.6        Chemistries:  Recent Labs   Lab 01/18/24  0503 01/19/24  0453   * 136   K 4.3 4.2   CL 99 100   CO2 30 35*   BUN 20* 19*   CREATININE 0.78 0.91   CALCIUM 9.2 9.2   MG 2.4* 2.7*       All pertinent labs within the past 24 hours have been reviewed.    Significant Imaging:  I have reviewed all pertinent imaging results/findings within the past 24 hours.  Imaging Results              CTA Chest Non-Coronary (PE Studies) (Final result)  Result time 01/14/24 16:27:26      Final result by Robert Waller MD (01/14/24 16:27:26)                   Impression:      No evidence of acute pulmonary embolic disease    Pulmonary edema  with or without superimposed right basilar pneumonia    Moderate layering pleural effusion bilaterally.      Electronically signed by: Robert Waller  Date:    01/14/2024  Time:    16:27               Narrative:    EXAMINATION:  CTA CHEST NON CORONARY (PE STUDIES)    CLINICAL HISTORY:  Pulmonary embolism (PE) suspected, positive D-dimer;.    COMPARISON:  No previous chest CT    TECHNIQUE:  Thin spiral CT sections were obtained through the neck during the dynamic IV administration of 100 ml of Isovue 370.  In addition to multiplanar reconstruction images, 3-D images were also generated, archived, and analyzed.    The CT examination was performed using one or more of the following dose reduction techniques: Automated exposure control, adjustment of the mA and kV according to patient's size, use of acute or iterative reconstruction techniques.    FINDINGS:  There is no discrete filling defect within the pulmonary arterial tree to specifically suggest acute pulmonary embolic disease.    There is no thoracic aorta aneurysm or dissection.  There is a moderate atherosclerotic irregularity and calcification of the aortic arch.    There is no mediastinal mass or mediastinal lymphadenopathy.    There is moderate layering pleural effusion bilaterally.    There is interstitial edema in either lung.  There is some asymmetric patchy airspace disease in the right lower lobe and asymmetric patchy and strandy opacity right middle lobe.  There is passive atelectasis in the lower lobes bilaterally.    There is mild cardiomegaly.    There is no definite acute process in the partially visualized upper abdomen.    There is scattered mild degenerative disc disease of the thoracic spine.                                        ABG  Recent Labs   Lab 01/14/24  1444   PH 7.38   PO2 57*   PCO2 46   HCO3 27.2     Assessment/Plan:     Pulmonary  Acute respiratory failure with hypoxia  Presenting in acute respiratory distress recovering upon  initiation of NIV in this patient with acute heart failure exacerbation with flash pulmonary edema.  ABG reviewed.  Improved work of breathing while on NIV continued improvement in nasal cannula today.      .    -   Now nasal cannula, saturating well  - titrate FiO2 for goal SpO2 >92%    - remains stable with NC       Cardiac/Vascular  * Acute congestive heart failure  No prior known cardiac history presenting with evidence of decompensated heart failure with bilateral pleural effusion and respiratory distress. CT imaging with dilated RA and LA and no evidence of VTE including no clot in transit.  Initial management has included Lasix IV.  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  Medication optimization as per Cardiology recommendations.    Type 2 MI (myocardial infarction)   Troponin down trended after being relatively flat, patient with no chest pain.  Did not require systemic anticoagulation with heparin drip.  Stated that she has had no chest pain.    Hypertensive urgency  RESOLVED    Presenting with hypertensive urgency characterize with respiratory distress and arrhythmia.  CTA chest with no evidence of acute aortic pathology.  No renal dysfunction on BNP.  No active chest pain following initiation of nitroglycerin drip in ED  .  Off  nitro drip.    Medication management as documented, can remain off nitroglycerin drip.    Atrial fibrillation with rapid ventricular response  AFib with RVR on presentation.  Management thus far has included digoxin and amiodarone push.      Continuing amiodarone drip, initiated on anticoagulation by Cardiology and we will continue at this time with close monitoring of her renal function.  Plan to discharge on oral anticoagulation.    - has been transitioned to PO amoidarone, HR controlled today         Critical Care Time: 35 minutes  Critical secondary to Patient has a condition that poses threat to life and bodily function: hypertensive urgency, afib RVR, and acute respiratory failure       Critical care was time spent personally by me on the following activities: development of treatment plan with patient or surrogate and bedside caregivers, discussions with consultants, evaluation of patient's response to treatment, examination of patient, ordering and performing treatments and interventions, ordering and review of laboratory studies, ordering and review of radiographic studies, pulse oximetry, re-evaluation of patient's condition. This critical care time did not overlap with that of any other provider or involve time for any procedures.     [facility]  Critical Care Medicine  [facility]

## 2024-01-20 PROBLEM — I21.A1 TYPE 2 MI (MYOCARDIAL INFARCTION): Status: RESOLVED | Noted: 2024-01-14 | Resolved: 2024-01-20

## 2024-01-20 PROBLEM — I50.21 ACUTE SYSTOLIC (CONGESTIVE) HEART FAILURE: Status: ACTIVE | Noted: 2024-01-20

## 2024-01-20 PROBLEM — I50.9 ACUTE CONGESTIVE HEART FAILURE: Status: RESOLVED | Noted: 2024-01-14 | Resolved: 2024-01-20

## 2024-01-20 PROBLEM — E03.9 HYPOTHYROIDISM: Status: ACTIVE | Noted: 2024-01-20

## 2024-01-20 PROBLEM — R82.71 BACTERIURIA: Status: ACTIVE | Noted: 2024-01-20

## 2024-01-20 LAB
ANION GAP SERPL CALCULATED.3IONS-SCNC: 6 MMOL/L (ref 7–16)
BACTERIA BLD CULT: NORMAL
BACTERIA BLD CULT: NORMAL
BASOPHILS # BLD AUTO: 0.11 K/UL (ref 0–0.2)
BASOPHILS NFR BLD AUTO: 1.4 % (ref 0–1)
BUN SERPL-MCNC: 21 MG/DL (ref 7–18)
BUN/CREAT SERPL: 24 (ref 6–20)
CALCIUM SERPL-MCNC: 9.6 MG/DL (ref 8.5–10.1)
CHLORIDE SERPL-SCNC: 101 MMOL/L (ref 98–107)
CO2 SERPL-SCNC: 32 MMOL/L (ref 21–32)
CREAT SERPL-MCNC: 0.89 MG/DL (ref 0.55–1.02)
DIFFERENTIAL METHOD BLD: ABNORMAL
EGFR (NO RACE VARIABLE) (RUSH/TITUS): 62 ML/MIN/1.73M2
EOSINOPHIL # BLD AUTO: 0.17 K/UL (ref 0–0.5)
EOSINOPHIL NFR BLD AUTO: 2.2 % (ref 1–4)
ERYTHROCYTE [DISTWIDTH] IN BLOOD BY AUTOMATED COUNT: 12.7 % (ref 11.5–14.5)
GLUCOSE SERPL-MCNC: 102 MG/DL (ref 74–106)
GLUCOSE SERPL-MCNC: 126 MG/DL (ref 70–105)
GLUCOSE SERPL-MCNC: 129 MG/DL (ref 70–105)
GLUCOSE SERPL-MCNC: 185 MG/DL (ref 70–105)
HCT VFR BLD AUTO: 41.3 % (ref 38–47)
HGB BLD-MCNC: 13.1 G/DL (ref 12–16)
IMM GRANULOCYTES # BLD AUTO: 0.03 K/UL (ref 0–0.04)
IMM GRANULOCYTES NFR BLD: 0.4 % (ref 0–0.4)
LYMPHOCYTES # BLD AUTO: 1.79 K/UL (ref 1–4.8)
LYMPHOCYTES NFR BLD AUTO: 22.8 % (ref 27–41)
MAGNESIUM SERPL-MCNC: 2.7 MG/DL (ref 1.7–2.3)
MCH RBC QN AUTO: 30.4 PG (ref 27–31)
MCHC RBC AUTO-ENTMCNC: 31.7 G/DL (ref 32–36)
MCV RBC AUTO: 95.8 FL (ref 80–96)
MONOCYTES # BLD AUTO: 0.51 K/UL (ref 0–0.8)
MONOCYTES NFR BLD AUTO: 6.5 % (ref 2–6)
MPC BLD CALC-MCNC: 10.4 FL (ref 9.4–12.4)
NEUTROPHILS # BLD AUTO: 5.25 K/UL (ref 1.8–7.7)
NEUTROPHILS NFR BLD AUTO: 66.7 % (ref 53–65)
NRBC # BLD AUTO: 0 X10E3/UL
NRBC, AUTO (.00): 0 %
PLATELET # BLD AUTO: 282 K/UL (ref 150–400)
POTASSIUM SERPL-SCNC: 4.1 MMOL/L (ref 3.5–5.1)
RBC # BLD AUTO: 4.31 M/UL (ref 4.2–5.4)
SODIUM SERPL-SCNC: 135 MMOL/L (ref 136–145)
T4 FREE SERPL-MCNC: 0.99 NG/DL (ref 0.76–1.46)
TSH SERPL DL<=0.005 MIU/L-ACNC: 7.66 UIU/ML (ref 0.36–3.74)
WBC # BLD AUTO: 7.86 K/UL (ref 4.5–11)

## 2024-01-20 PROCEDURE — 63600175 PHARM REV CODE 636 W HCPCS: Performed by: STUDENT IN AN ORGANIZED HEALTH CARE EDUCATION/TRAINING PROGRAM

## 2024-01-20 PROCEDURE — 83735 ASSAY OF MAGNESIUM: CPT | Performed by: STUDENT IN AN ORGANIZED HEALTH CARE EDUCATION/TRAINING PROGRAM

## 2024-01-20 PROCEDURE — 25000003 PHARM REV CODE 250: Performed by: NURSE PRACTITIONER

## 2024-01-20 PROCEDURE — 99232 SBSQ HOSP IP/OBS MODERATE 35: CPT | Mod: ,,, | Performed by: HOSPITALIST

## 2024-01-20 PROCEDURE — 63600175 PHARM REV CODE 636 W HCPCS: Performed by: NURSE PRACTITIONER

## 2024-01-20 PROCEDURE — 11000001 HC ACUTE MED/SURG PRIVATE ROOM

## 2024-01-20 PROCEDURE — 82962 GLUCOSE BLOOD TEST: CPT

## 2024-01-20 PROCEDURE — 85025 COMPLETE CBC W/AUTO DIFF WBC: CPT | Performed by: STUDENT IN AN ORGANIZED HEALTH CARE EDUCATION/TRAINING PROGRAM

## 2024-01-20 PROCEDURE — 84439 ASSAY OF FREE THYROXINE: CPT | Performed by: HOSPITALIST

## 2024-01-20 PROCEDURE — 27000221 HC OXYGEN, UP TO 24 HOURS

## 2024-01-20 PROCEDURE — 80048 BASIC METABOLIC PNL TOTAL CA: CPT | Performed by: STUDENT IN AN ORGANIZED HEALTH CARE EDUCATION/TRAINING PROGRAM

## 2024-01-20 PROCEDURE — 99900035 HC TECH TIME PER 15 MIN (STAT)

## 2024-01-20 PROCEDURE — 84443 ASSAY THYROID STIM HORMONE: CPT | Performed by: HOSPITALIST

## 2024-01-20 RX ORDER — METOPROLOL SUCCINATE 100 MG/1
100 TABLET, EXTENDED RELEASE ORAL DAILY
Status: DISCONTINUED | OUTPATIENT
Start: 2024-01-21 | End: 2024-01-23 | Stop reason: HOSPADM

## 2024-01-20 RX ADMIN — OXYMETAZOLINE HYDROCHLORIDE 2 SPRAY: 0.05 SPRAY NASAL at 08:01

## 2024-01-20 RX ADMIN — AMIODARONE HYDROCHLORIDE 400 MG: 200 TABLET ORAL at 09:01

## 2024-01-20 RX ADMIN — AMIODARONE HYDROCHLORIDE 400 MG: 200 TABLET ORAL at 08:01

## 2024-01-20 RX ADMIN — ASPIRIN 81 MG: 81 TABLET, COATED ORAL at 09:01

## 2024-01-20 RX ADMIN — METOPROLOL SUCCINATE 50 MG: 50 TABLET, EXTENDED RELEASE ORAL at 09:01

## 2024-01-20 RX ADMIN — ATORVASTATIN CALCIUM 20 MG: 20 TABLET, FILM COATED ORAL at 08:01

## 2024-01-20 RX ADMIN — OXYMETAZOLINE HYDROCHLORIDE 2 SPRAY: 0.05 SPRAY NASAL at 10:01

## 2024-01-20 RX ADMIN — FUROSEMIDE 40 MG: 10 INJECTION, SOLUTION INTRAVENOUS at 09:01

## 2024-01-20 RX ADMIN — ENOXAPARIN SODIUM 70 MG: 80 INJECTION SUBCUTANEOUS at 08:01

## 2024-01-20 RX ADMIN — LOSARTAN POTASSIUM 50 MG: 50 TABLET, FILM COATED ORAL at 09:01

## 2024-01-20 RX ADMIN — ENOXAPARIN SODIUM 70 MG: 80 INJECTION SUBCUTANEOUS at 09:01

## 2024-01-20 NOTE — NURSING
Pt transferred to 5N via wheelchair with belongings. Report given to DARLENE Avendano over the phone.Pt transferred on 4L NC, VSS, no request or questions.

## 2024-01-20 NOTE — ASSESSMENT & PLAN NOTE
New onset; seen by Cardiology on admission; patient is refusing invasive testing for her condition; continue meds per Cardiology; on Lasix for diuresis  ECHO suggestive of restrictive cardiomyopathy with severe systolic dysfunction and severe mitral regurgitation; EF around 20-25%

## 2024-01-20 NOTE — ASSESSMENT & PLAN NOTE
Urine culture cell count not significant for infection and she is not complaining of UTI symptoms

## 2024-01-20 NOTE — PROGRESS NOTES
[facility] Tele[facility] Hospital Medicine  Progress Note    Patient Name: Windy Swann  MRN: [id number]  Patient Class: IP- Inpatient   Admission Date: 1/14/2024  Length of Stay: 6 days  Attending Physician: Sarah Stanford DO  Primary Care Provider: Alyssa, Primary Doctor        Subjective:     Principal Problem:Acute systolic (congestive) heart failure        HPI:  No notes on file    Overview/Hospital Course:  89 year old female with an extensive PMH presents to the hospital with shortness of breath and chest pain.  She was found to be in atrial fibrillation on admission and placed on an Amiodarone drip. She was placed in the ICU on a NTG drip for her HTN.  She was also placed on Bipap.  She was diuresed.  The patient told Cardiology she did not want invasive/extensive testing for her medical conditions.   She has new onset afib, systolic CHF, and severe MR.        Vitals:    01/20/24 0649 01/20/24 0900 01/20/24 1010 01/20/24 1300   BP: 130/84  106/67    BP Location: Left arm      Patient Position: Sitting      Pulse: 83  97    Resp: (!) 21  20    Temp: 98 °F (36.7 °C)  97 °F (36.1 °C)    TempSrc: Oral      SpO2: 96% 95% 97% 97%   Weight:       Height:             PHYSICAL EXAM:    GEN: NAD; alert and oriented x 3  CVS: irregular rate and rhythm with II/VI systolic ejection murmur  RESP: clear to auscultation bilaterally; no rhonchi, rales, or wheezes noted  GI: soft, non-tender, non-distended; + bowel sounds  EXTR: no clubbing, cyanosis, or edema      Assessment/Plan:      * Acute systolic (congestive) heart failure  New onset; seen by Cardiology on admission; patient is refusing invasive testing for her condition; continue meds per Cardiology; on Lasix for diuresis  ECHO suggestive of restrictive cardiomyopathy with severe systolic dysfunction and severe mitral regurgitation; EF around 20-25%    Atrial fibrillation with rapid ventricular response  New onset; continue Amiodarone,  Metoprolol and Lovenox; followed by Cardiology    Acute respiratory failure with hypoxia  Has systolic CHF and fluid overload; on Lasix IV; possible pneumonia on CT chest--she was given abx earlier in her hospital stay but the abx were discontinued in the ICU due to the suspicion that the infiltrates were fluid from CHF and not infection; on 4L O2    Severe mitral regurgitation  Refusing invasive testing; followed by Cardiology; continue current meds      Bacteriuria  Urine culture cell count not significant for infection and she is not complaining of UTI symptoms      Hypothyroidism  TSH elevated previously but T4 normal; will recheck thyroid labs      Hypertensive urgency  Resolved; on BP meds; will monitor      May need placement    Sarah Stanford DO  Department of Hospital Medicine   [facility] - [address] [place] [address]

## 2024-01-20 NOTE — HOSPITAL COURSE
89 year old female with an extensive PMH presents to the hospital with shortness of breath and chest pain.  She was found to be in atrial fibrillation on admission and placed on an Amiodarone drip. She was placed in the ICU on a NTG drip for her HTN.  She was also placed on Bipap.  She was diuresed.  The patient told Cardiology she did not want invasive/extensive testing for her medical conditions.   She has new onset afib, systolic CHF, and severe MR.  She has been optimized medically.. Would benefit from swing bed services to maximize her function.  She lives alone in [place].  She is being transferred to [place] for swing. At the end of that stay would likely benefit from transition to home hospice.  Other option would be home health.

## 2024-01-20 NOTE — ASSESSMENT & PLAN NOTE
Has systolic CHF and fluid overload; on Lasix IV; possible pneumonia on CT chest--she was given abx earlier in her hospital stay but the abx were discontinued in the ICU due to the suspicion that the infiltrates were fluid from CHF and not infection; on 4L O2

## 2024-01-21 PROBLEM — R82.71 BACTERIURIA: Status: RESOLVED | Noted: 2024-01-20 | Resolved: 2024-01-21

## 2024-01-21 LAB
ANION GAP SERPL CALCULATED.3IONS-SCNC: 5 MMOL/L (ref 7–16)
BASOPHILS # BLD AUTO: 0.11 K/UL (ref 0–0.2)
BASOPHILS NFR BLD AUTO: 1.5 % (ref 0–1)
BUN SERPL-MCNC: 23 MG/DL (ref 7–18)
BUN/CREAT SERPL: 27 (ref 6–20)
CALCIUM SERPL-MCNC: 9.5 MG/DL (ref 8.5–10.1)
CHLORIDE SERPL-SCNC: 101 MMOL/L (ref 98–107)
CO2 SERPL-SCNC: 34 MMOL/L (ref 21–32)
CREAT SERPL-MCNC: 0.84 MG/DL (ref 0.55–1.02)
DIFFERENTIAL METHOD BLD: ABNORMAL
EGFR (NO RACE VARIABLE) (RUSH/TITUS): 66 ML/MIN/1.73M2
EOSINOPHIL # BLD AUTO: 0.15 K/UL (ref 0–0.5)
EOSINOPHIL NFR BLD AUTO: 2 % (ref 1–4)
ERYTHROCYTE [DISTWIDTH] IN BLOOD BY AUTOMATED COUNT: 12.8 % (ref 11.5–14.5)
GLUCOSE SERPL-MCNC: 103 MG/DL (ref 70–105)
GLUCOSE SERPL-MCNC: 112 MG/DL (ref 74–106)
GLUCOSE SERPL-MCNC: 114 MG/DL (ref 70–105)
GLUCOSE SERPL-MCNC: 114 MG/DL (ref 70–105)
GLUCOSE SERPL-MCNC: 121 MG/DL (ref 70–105)
HCT VFR BLD AUTO: 39.4 % (ref 38–47)
HGB BLD-MCNC: 12.9 G/DL (ref 12–16)
IMM GRANULOCYTES # BLD AUTO: 0.04 K/UL (ref 0–0.04)
IMM GRANULOCYTES NFR BLD: 0.5 % (ref 0–0.4)
LYMPHOCYTES # BLD AUTO: 1.33 K/UL (ref 1–4.8)
LYMPHOCYTES NFR BLD AUTO: 17.7 % (ref 27–41)
MAGNESIUM SERPL-MCNC: 2.6 MG/DL (ref 1.7–2.3)
MCH RBC QN AUTO: 30.8 PG (ref 27–31)
MCHC RBC AUTO-ENTMCNC: 32.7 G/DL (ref 32–36)
MCV RBC AUTO: 94 FL (ref 80–96)
MONOCYTES # BLD AUTO: 0.49 K/UL (ref 0–0.8)
MONOCYTES NFR BLD AUTO: 6.5 % (ref 2–6)
MPC BLD CALC-MCNC: 10 FL (ref 9.4–12.4)
NEUTROPHILS # BLD AUTO: 5.4 K/UL (ref 1.8–7.7)
NEUTROPHILS NFR BLD AUTO: 71.8 % (ref 53–65)
NRBC # BLD AUTO: 0 X10E3/UL
NRBC, AUTO (.00): 0 %
PLATELET # BLD AUTO: 275 K/UL (ref 150–400)
POTASSIUM SERPL-SCNC: 4 MMOL/L (ref 3.5–5.1)
RBC # BLD AUTO: 4.19 M/UL (ref 4.2–5.4)
SODIUM SERPL-SCNC: 136 MMOL/L (ref 136–145)
WBC # BLD AUTO: 7.52 K/UL (ref 4.5–11)

## 2024-01-21 PROCEDURE — 99232 SBSQ HOSP IP/OBS MODERATE 35: CPT | Mod: ,,, | Performed by: FAMILY MEDICINE

## 2024-01-21 PROCEDURE — 11000001 HC ACUTE MED/SURG PRIVATE ROOM

## 2024-01-21 PROCEDURE — 99233 SBSQ HOSP IP/OBS HIGH 50: CPT | Mod: ,,, | Performed by: STUDENT IN AN ORGANIZED HEALTH CARE EDUCATION/TRAINING PROGRAM

## 2024-01-21 PROCEDURE — 25000003 PHARM REV CODE 250: Performed by: NURSE PRACTITIONER

## 2024-01-21 PROCEDURE — 63600175 PHARM REV CODE 636 W HCPCS: Performed by: STUDENT IN AN ORGANIZED HEALTH CARE EDUCATION/TRAINING PROGRAM

## 2024-01-21 PROCEDURE — 82962 GLUCOSE BLOOD TEST: CPT

## 2024-01-21 PROCEDURE — 27000221 HC OXYGEN, UP TO 24 HOURS

## 2024-01-21 PROCEDURE — 25000003 PHARM REV CODE 250: Performed by: INTERNAL MEDICINE

## 2024-01-21 PROCEDURE — 85025 COMPLETE CBC W/AUTO DIFF WBC: CPT | Performed by: STUDENT IN AN ORGANIZED HEALTH CARE EDUCATION/TRAINING PROGRAM

## 2024-01-21 PROCEDURE — 99900035 HC TECH TIME PER 15 MIN (STAT)

## 2024-01-21 PROCEDURE — 83735 ASSAY OF MAGNESIUM: CPT | Performed by: STUDENT IN AN ORGANIZED HEALTH CARE EDUCATION/TRAINING PROGRAM

## 2024-01-21 PROCEDURE — 25000003 PHARM REV CODE 250: Performed by: STUDENT IN AN ORGANIZED HEALTH CARE EDUCATION/TRAINING PROGRAM

## 2024-01-21 PROCEDURE — 63600175 PHARM REV CODE 636 W HCPCS: Performed by: NURSE PRACTITIONER

## 2024-01-21 PROCEDURE — 80048 BASIC METABOLIC PNL TOTAL CA: CPT | Performed by: STUDENT IN AN ORGANIZED HEALTH CARE EDUCATION/TRAINING PROGRAM

## 2024-01-21 PROCEDURE — 94761 N-INVAS EAR/PLS OXIMETRY MLT: CPT

## 2024-01-21 RX ORDER — LEVOTHYROXINE SODIUM 25 UG/1
25 TABLET ORAL
Status: DISCONTINUED | OUTPATIENT
Start: 2024-01-22 | End: 2024-01-23 | Stop reason: HOSPADM

## 2024-01-21 RX ADMIN — AMIODARONE HYDROCHLORIDE 400 MG: 200 TABLET ORAL at 09:01

## 2024-01-21 RX ADMIN — ENOXAPARIN SODIUM 70 MG: 80 INJECTION SUBCUTANEOUS at 09:01

## 2024-01-21 RX ADMIN — LOSARTAN POTASSIUM 50 MG: 50 TABLET, FILM COATED ORAL at 09:01

## 2024-01-21 RX ADMIN — TRAZODONE HYDROCHLORIDE 25 MG: 50 TABLET ORAL at 09:01

## 2024-01-21 RX ADMIN — FUROSEMIDE 40 MG: 10 INJECTION, SOLUTION INTRAVENOUS at 09:01

## 2024-01-21 RX ADMIN — APIXABAN 5 MG: 5 TABLET, FILM COATED ORAL at 09:01

## 2024-01-21 RX ADMIN — OXYMETAZOLINE HYDROCHLORIDE 2 SPRAY: 0.05 SPRAY NASAL at 09:01

## 2024-01-21 RX ADMIN — ASPIRIN 81 MG: 81 TABLET, COATED ORAL at 09:01

## 2024-01-21 RX ADMIN — ATORVASTATIN CALCIUM 20 MG: 20 TABLET, FILM COATED ORAL at 09:01

## 2024-01-21 RX ADMIN — METOPROLOL SUCCINATE 100 MG: 100 TABLET, EXTENDED RELEASE ORAL at 09:01

## 2024-01-21 NOTE — ASSESSMENT & PLAN NOTE
TSH elevated previously but T4 normal; will recheck thyroid labs.     1/21 - TSH increased. FT4 low nl.  There may be some benefit to treating.  Will start synthroid 25 in am.

## 2024-01-21 NOTE — SUBJECTIVE & OBJECTIVE
Interval History: Feels ok. Quite loquacious, a bit tangential.      Review of Systems   Respiratory:  Negative for shortness of breath.    Cardiovascular:  Negative for chest pain.     Objective:     Vital Signs (Most Recent):  Temp: 98.5 °F (36.9 °C) (01/21/24 1430)  Pulse: 100 (01/21/24 1430)  Resp: 20 (01/21/24 1430)  BP: 121/73 (01/21/24 1430)  SpO2: 98 % (01/21/24 1430) Vital Signs (24h Range):  Temp:  [97.5 °F (36.4 °C)-98.5 °F (36.9 °C)] 98.5 °F (36.9 °C)  Pulse:  [] 100  Resp:  [19-22] 20  SpO2:  [96 %-98 %] 98 %  BP: (108-147)/(53-90) 121/73     Weight: 74.6 kg (164 lb 7.4 oz)  Body mass index is 26.55 kg/m².    Intake/Output Summary (Last 24 hours) at 1/21/2024 1722  Last data filed at 1/21/2024 0512  Gross per 24 hour   Intake --   Output 2 ml   Net -2 ml         Physical Exam  Vitals and nursing note reviewed.   Constitutional:       General: She is not in acute distress.     Appearance: Normal appearance. She is not ill-appearing.   HENT:      Head: Normocephalic and atraumatic.   Eyes:      General: No scleral icterus.  Cardiovascular:      Rate and Rhythm: Tachycardia present. Rhythm irregular.      Heart sounds: Murmur (Grade 2/6 systolic) heard.   Pulmonary:      Breath sounds: Rales present. No rhonchi.   Abdominal:      Palpations: Abdomen is soft.      Tenderness: There is no abdominal tenderness. There is no guarding.   Musculoskeletal:      Cervical back: Normal range of motion.      Right lower leg: No edema.      Left lower leg: No edema.   Skin:     General: Skin is warm.      Capillary Refill: Capillary refill takes less than 2 seconds.      Coloration: Skin is not jaundiced.   Neurological:      General: No focal deficit present.      Mental Status: She is alert. Mental status is at baseline.             Significant Labs: All pertinent labs within the past 24 hours have been reviewed.  BMP:   Recent Labs   Lab 01/21/24  0319   *      K 4.0      CO2 34*   BUN 23*    CREATININE 0.84   CALCIUM 9.5   MG 2.6*     CBC:   Recent Labs   Lab 01/20/24  0557 01/21/24  0319   WBC 7.86 7.52   HGB 13.1 12.9   HCT 41.3 39.4    275       Significant Imaging: I have reviewed all pertinent imaging results/findings within the past 24 hours.

## 2024-01-21 NOTE — ASSESSMENT & PLAN NOTE
New onset; continue Amiodarone, Metoprolol and Lovenox; followed by Cardiology    1/21 - As above.

## 2024-01-21 NOTE — PROGRESS NOTES
[facility] Medicine  Progress Note    Patient Name: Windy Swann  MRN: [id number]  Patient Class: IP- Inpatient   Admission Date: 1/14/2024  Length of Stay: 7 days  Attending Physician: Speedy Andrade Jr., MD  Primary Care Provider: Alyssa, Primary Doctor        Subjective:     Principal Problem:Acute systolic (congestive) heart failure        HPI:  No notes on file    Overview/Hospital Course:  89 year old female with an extensive PMH presents to the hospital with shortness of breath and chest pain.  She was found to be in atrial fibrillation on admission and placed on an Amiodarone drip. She was placed in the ICU on a NTG drip for her HTN.  She was also placed on Bipap.  She was diuresed.  The patient told Cardiology she did not want invasive/extensive testing for her medical conditions.   She has new onset afib, systolic CHF, and severe MR.      Interval History: Feels ok. Quite loquacious, a bit tangential.      Review of Systems   Respiratory:  Negative for shortness of breath.    Cardiovascular:  Negative for chest pain.     Objective:     Vital Signs (Most Recent):  Temp: 98.5 °F (36.9 °C) (01/21/24 1430)  Pulse: 100 (01/21/24 1430)  Resp: 20 (01/21/24 1430)  BP: 121/73 (01/21/24 1430)  SpO2: 98 % (01/21/24 1430) Vital Signs (24h Range):  Temp:  [97.5 °F (36.4 °C)-98.5 °F (36.9 °C)] 98.5 °F (36.9 °C)  Pulse:  [] 100  Resp:  [19-22] 20  SpO2:  [96 %-98 %] 98 %  BP: (108-147)/(53-90) 121/73     Weight: 74.6 kg (164 lb 7.4 oz)  Body mass index is 26.55 kg/m².    Intake/Output Summary (Last 24 hours) at 1/21/2024 1722  Last data filed at 1/21/2024 0512  Gross per 24 hour   Intake --   Output 2 ml   Net -2 ml         Physical Exam  Vitals and nursing note reviewed.   Constitutional:       General: She is not in acute distress.     Appearance: Normal appearance. She is not ill-appearing.   HENT:      Head: Normocephalic and atraumatic.   Eyes:      General: No  scleral icterus.  Cardiovascular:      Rate and Rhythm: Tachycardia present. Rhythm irregular.      Heart sounds: Murmur (Grade 2/6 systolic) heard.   Pulmonary:      Breath sounds: Rales present. No rhonchi.   Abdominal:      Palpations: Abdomen is soft.      Tenderness: There is no abdominal tenderness. There is no guarding.   Musculoskeletal:      Cervical back: Normal range of motion.      Right lower leg: No edema.      Left lower leg: No edema.   Skin:     General: Skin is warm.      Capillary Refill: Capillary refill takes less than 2 seconds.      Coloration: Skin is not jaundiced.   Neurological:      General: No focal deficit present.      Mental Status: She is alert. Mental status is at baseline.             Significant Labs: All pertinent labs within the past 24 hours have been reviewed.  BMP:   Recent Labs   Lab 01/21/24  0319   *      K 4.0      CO2 34*   BUN 23*   CREATININE 0.84   CALCIUM 9.5   MG 2.6*     CBC:   Recent Labs   Lab 01/20/24  0557 01/21/24  0319   WBC 7.86 7.52   HGB 13.1 12.9   HCT 41.3 39.4    275       Significant Imaging: I have reviewed all pertinent imaging results/findings within the past 24 hours.    Assessment/Plan:      * Acute systolic (congestive) heart failure  New onset; seen by Cardiology on admission; patient is refusing invasive testing for her condition; continue meds per Cardiology; on Lasix for diuresis  ECHO suggestive of restrictive cardiomyopathy with severe systolic dysfunction and severe mitral regurgitation; EF around 20-25%    Atrial fibrillation with rapid ventricular response  New onset; continue Amiodarone, Metoprolol and Lovenox; followed by Cardiology    1/21 - As above.     Acute respiratory failure with hypoxia  Has systolic CHF and fluid overload; on Lasix IV; possible pneumonia on CT chest--she was given abx earlier in her hospital stay but the abx were discontinued in the ICU due to the suspicion that the infiltrates were  fluid from CHF and not infection; on 4L O2    1/21 - Not SOB at rest. Mobilize tomorrow.     Severe mitral regurgitation  Refusing invasive testing; followed by Cardiology; continue current meds      Hypothyroidism  TSH elevated previously but T4 normal; will recheck thyroid labs.     1/21 - TSH increased. FT4 low nl.  There may be some benefit to treating.  Will start synthroid 25 in am.       Hypertensive urgency  Resolved; on BP meds; will monitor      VTE Risk Mitigation (From admission, onward)           Ordered     apixaban tablet 5 mg  2 times daily         01/21/24 1202     IP VTE HIGH RISK PATIENT  Once         01/14/24 1751     Place sequential compression device  Until discontinued         01/14/24 1751                    Discharge Planning   SELWYN:      Code Status: DNR   Is the patient medically ready for discharge?:     Reason for patient still in hospital (select all that apply): Treatment  Discharge Plan A: Hospice/home                  Speedy Andrade Jr, MD  Department of Hospital Medicine   [facility] - [address] [place] [address]

## 2024-01-21 NOTE — ASSESSMENT & PLAN NOTE
Has systolic CHF and fluid overload; on Lasix IV; possible pneumonia on CT chest--she was given abx earlier in her hospital stay but the abx were discontinued in the ICU due to the suspicion that the infiltrates were fluid from CHF and not infection; on 4L O2    1/21 - Not SOB at rest. Mobilize tomorrow.

## 2024-01-22 LAB
ANION GAP SERPL CALCULATED.3IONS-SCNC: 8 MMOL/L (ref 7–16)
BASOPHILS # BLD AUTO: 0.11 K/UL (ref 0–0.2)
BASOPHILS NFR BLD AUTO: 1.2 % (ref 0–1)
BUN SERPL-MCNC: 21 MG/DL (ref 7–18)
BUN/CREAT SERPL: 24 (ref 6–20)
CALCIUM SERPL-MCNC: 9.9 MG/DL (ref 8.5–10.1)
CHLORIDE SERPL-SCNC: 99 MMOL/L (ref 98–107)
CO2 SERPL-SCNC: 36 MMOL/L (ref 21–32)
CREAT SERPL-MCNC: 0.89 MG/DL (ref 0.55–1.02)
DIFFERENTIAL METHOD BLD: ABNORMAL
EGFR (NO RACE VARIABLE) (RUSH/TITUS): 62 ML/MIN/1.73M2
EOSINOPHIL # BLD AUTO: 0.15 K/UL (ref 0–0.5)
EOSINOPHIL NFR BLD AUTO: 1.7 % (ref 1–4)
ERYTHROCYTE [DISTWIDTH] IN BLOOD BY AUTOMATED COUNT: 13.1 % (ref 11.5–14.5)
GLUCOSE SERPL-MCNC: 104 MG/DL (ref 74–106)
GLUCOSE SERPL-MCNC: 105 MG/DL (ref 70–105)
GLUCOSE SERPL-MCNC: 112 MG/DL (ref 70–105)
GLUCOSE SERPL-MCNC: 136 MG/DL (ref 70–105)
GLUCOSE SERPL-MCNC: 158 MG/DL (ref 70–105)
HCT VFR BLD AUTO: 43.8 % (ref 38–47)
HGB BLD-MCNC: 13.9 G/DL (ref 12–16)
IMM GRANULOCYTES # BLD AUTO: 0.05 K/UL (ref 0–0.04)
IMM GRANULOCYTES NFR BLD: 0.6 % (ref 0–0.4)
LYMPHOCYTES # BLD AUTO: 1.54 K/UL (ref 1–4.8)
LYMPHOCYTES NFR BLD AUTO: 17.4 % (ref 27–41)
MAGNESIUM SERPL-MCNC: 2.6 MG/DL (ref 1.7–2.3)
MCH RBC QN AUTO: 30.5 PG (ref 27–31)
MCHC RBC AUTO-ENTMCNC: 31.7 G/DL (ref 32–36)
MCV RBC AUTO: 96.1 FL (ref 80–96)
MONOCYTES # BLD AUTO: 0.55 K/UL (ref 0–0.8)
MONOCYTES NFR BLD AUTO: 6.2 % (ref 2–6)
MPC BLD CALC-MCNC: 10.1 FL (ref 9.4–12.4)
NEUTROPHILS # BLD AUTO: 6.47 K/UL (ref 1.8–7.7)
NEUTROPHILS NFR BLD AUTO: 72.9 % (ref 53–65)
NRBC # BLD AUTO: 0 X10E3/UL
NRBC, AUTO (.00): 0 %
PLATELET # BLD AUTO: 294 K/UL (ref 150–400)
POTASSIUM SERPL-SCNC: 4.6 MMOL/L (ref 3.5–5.1)
RBC # BLD AUTO: 4.56 M/UL (ref 4.2–5.4)
SODIUM SERPL-SCNC: 138 MMOL/L (ref 136–145)
WBC # BLD AUTO: 8.87 K/UL (ref 4.5–11)

## 2024-01-22 PROCEDURE — 63600175 PHARM REV CODE 636 W HCPCS: Performed by: STUDENT IN AN ORGANIZED HEALTH CARE EDUCATION/TRAINING PROGRAM

## 2024-01-22 PROCEDURE — 83735 ASSAY OF MAGNESIUM: CPT | Performed by: STUDENT IN AN ORGANIZED HEALTH CARE EDUCATION/TRAINING PROGRAM

## 2024-01-22 PROCEDURE — 25000003 PHARM REV CODE 250: Performed by: NURSE PRACTITIONER

## 2024-01-22 PROCEDURE — 97162 PT EVAL MOD COMPLEX 30 MIN: CPT

## 2024-01-22 PROCEDURE — 25000003 PHARM REV CODE 250: Performed by: STUDENT IN AN ORGANIZED HEALTH CARE EDUCATION/TRAINING PROGRAM

## 2024-01-22 PROCEDURE — 99900035 HC TECH TIME PER 15 MIN (STAT)

## 2024-01-22 PROCEDURE — 97166 OT EVAL MOD COMPLEX 45 MIN: CPT

## 2024-01-22 PROCEDURE — 25000003 PHARM REV CODE 250: Performed by: INTERNAL MEDICINE

## 2024-01-22 PROCEDURE — 99232 SBSQ HOSP IP/OBS MODERATE 35: CPT | Mod: ,,, | Performed by: FAMILY MEDICINE

## 2024-01-22 PROCEDURE — 27000221 HC OXYGEN, UP TO 24 HOURS

## 2024-01-22 PROCEDURE — 80048 BASIC METABOLIC PNL TOTAL CA: CPT | Performed by: STUDENT IN AN ORGANIZED HEALTH CARE EDUCATION/TRAINING PROGRAM

## 2024-01-22 PROCEDURE — 25000003 PHARM REV CODE 250: Performed by: FAMILY MEDICINE

## 2024-01-22 PROCEDURE — 11000001 HC ACUTE MED/SURG PRIVATE ROOM

## 2024-01-22 PROCEDURE — 94660 CPAP INITIATION&MGMT: CPT

## 2024-01-22 PROCEDURE — 94761 N-INVAS EAR/PLS OXIMETRY MLT: CPT

## 2024-01-22 PROCEDURE — 85025 COMPLETE CBC W/AUTO DIFF WBC: CPT | Performed by: STUDENT IN AN ORGANIZED HEALTH CARE EDUCATION/TRAINING PROGRAM

## 2024-01-22 PROCEDURE — 82962 GLUCOSE BLOOD TEST: CPT

## 2024-01-22 RX ORDER — FUROSEMIDE 40 MG/1
40 TABLET ORAL DAILY
Status: DISCONTINUED | OUTPATIENT
Start: 2024-01-23 | End: 2024-01-23 | Stop reason: HOSPADM

## 2024-01-22 RX ADMIN — OXYMETAZOLINE HYDROCHLORIDE 2 SPRAY: 0.05 SPRAY NASAL at 09:01

## 2024-01-22 RX ADMIN — APIXABAN 5 MG: 5 TABLET, FILM COATED ORAL at 09:01

## 2024-01-22 RX ADMIN — FUROSEMIDE 40 MG: 10 INJECTION, SOLUTION INTRAVENOUS at 09:01

## 2024-01-22 RX ADMIN — ASPIRIN 81 MG: 81 TABLET, COATED ORAL at 09:01

## 2024-01-22 RX ADMIN — AMIODARONE HYDROCHLORIDE 400 MG: 200 TABLET ORAL at 10:01

## 2024-01-22 RX ADMIN — LOSARTAN POTASSIUM 50 MG: 50 TABLET, FILM COATED ORAL at 09:01

## 2024-01-22 RX ADMIN — LEVOTHYROXINE SODIUM 25 MCG: 0.03 TABLET ORAL at 05:01

## 2024-01-22 RX ADMIN — TRAZODONE HYDROCHLORIDE 25 MG: 50 TABLET ORAL at 10:01

## 2024-01-22 RX ADMIN — APIXABAN 5 MG: 5 TABLET, FILM COATED ORAL at 10:01

## 2024-01-22 RX ADMIN — AMIODARONE HYDROCHLORIDE 400 MG: 200 TABLET ORAL at 09:01

## 2024-01-22 RX ADMIN — METOPROLOL SUCCINATE 100 MG: 100 TABLET, EXTENDED RELEASE ORAL at 09:01

## 2024-01-22 RX ADMIN — ATORVASTATIN CALCIUM 20 MG: 20 TABLET, FILM COATED ORAL at 10:01

## 2024-01-22 NOTE — PLAN OF CARE
[facility]  Discharge Reassessment    Primary Care Provider: No, Primary Doctor    Expected Discharge Date:     Reassessment (most recent)       Discharge Reassessment - 01/22/24 1118          Discharge Reassessment    Assessment Type Discharge Planning Reassessment     Did the patient's condition or plan change since previous assessment? Yes     Discharge Plan discussed with: Patient;Adult children     Communicated SELWYN with patient/caregiver Date not available/Unable to determine     Discharge Plan A Skilled Nursing Facility     Discharge Plan B Home     DME Needed Upon Discharge  none     Why the patient remains in the hospital Requires continued medical care        Post-Acute Status    Post-Acute Authorization Placement     Post-Acute Placement Status Referrals Sent     Patient choice form signed by patient/caregiver List with quality metrics by geographic area provided;List from CMS Compare;List from System Post-Acute Care     Discharge Delays None known at this time                 Ss spoke with pt and pt's step dgtr and pt agreeable to [facility]. Choice obtained for [facility]. Referral made. Pt/ot ordered. Dr mcfadden. Ss following.

## 2024-01-22 NOTE — PROGRESS NOTES
[facility]  Adult Nutrition  First Assessment Note         Reason for Assessment  Reason For Assessment: length of stay admitted 1/14/24  Nutrition Risk Screen: no indicators present    Assessment and Plan    Patient admitted 1/14/2024 with dx of acute systolic CHF, A Fib, and respiratory failure. Patient is ordered a regular diet. No documented chewing or swallowing issues noted. No issues with appetite prior to admission per MST screen.     Current weight is 167 pounds with a BMI of 27.11 which is WNL per geriatric standards. Last BM today. Limited po intake documented on flowsheets. Noted 75% intake documented by nursing at one meal.     Recommend to continue diet as tolerated. Encourage po intakes. RD Following.       Learning Needs/Social Determinants of Health  Learning Assessment       01/14/2024 2000 [facility] (1/14/2024 - Present)   Created by Kenyatta Syed, RN - RN (Nurse) Status: Complete                 PRIMARY LEARNER     Primary Learner Name:  hernesto ayala  - 01/14/2024 2000    Relationship:  Patient CR - 01/14/2024 2000    Does the primary learner have any barriers to learning?:  No Barriers  - 01/14/2024 2000    What is the preferred language of the primary learner?:  English  - 01/14/2024 2000    Is an  required?:  No  - 01/14/2024 2000    How does the primary learner prefer to learn new concepts?:  Listening  - 01/14/2024 2000    How often do you need to have someone help you read instructions, pamphlets, or written material from your doctor or pharmacy?:  Never  - 01/14/2024 2000        CO-LEARNER #1     No question answered        CO-LEARNER #2     No question answered        SPECIAL TOPICS     No question answered        ANSWERED BY:     No question answered        Edit History       Kenyatta Syed, RN - RN (Nurse)   01/14/2024 2000                           Social Determinants of Health      Tobacco Use: Not on file   Alcohol Use: Not on file   Financial Resource Strain: Low Risk  (1/16/2024)    Overall Financial Resource Strain (CARDIA)     Difficulty of Paying Living Expenses: Not hard at all   Food Insecurity: Patient Declined (1/16/2024)    Hunger Vital Sign     Worried About Running Out of Food in the Last Year: Patient declined     Ran Out of Food in the Last Year: Patient declined   Transportation Needs: No Transportation Needs (1/16/2024)    PRAPARE - Transportation     Lack of Transportation (Medical): No     Lack of Transportation (Non-Medical): No   Physical Activity: Not on file   Stress: Patient Declined (1/16/2024)    [employer] [place] [employer] - Occupational Stress Questionnaire     Feeling of Stress : Patient declined   Social Connections: Not on file   Housing Stability: Low Risk  (1/16/2024)    Housing Stability Vital Sign     Unable to Pay for Housing in the Last Year: No     Number of Places Lived in the Last Year: 1     Unstable Housing in the Last Year: No   Depression: Not on file            Malnutrition  Is Patient Malnourished: No    Skin Integrity  Bear Risk Assessment  Sensory Perception: 4-->no impairment  Moisture: 3-->occasionally moist  Activity: 3-->walks occasionally  Mobility: 3-->slightly limited  Nutrition: 3-->adequate  Friction and Shear: 3-->no apparent problem  Bear Score: 19  Comments on skin integrity: no breakdown noted    Nutrition Diagnosis  No Nutritional Diagnosis at this time   Nutrition Risk         Recent Labs   Lab 01/22/24  0448 01/22/24  0718 01/22/24  1119     --   --    POCGLU  --    < > 158*    < > = values in this interval not displayed.     Comments on Glucose: no pmh DM noted    Nutrition Prescription / Recommendations  Recommendation/Intervention: continue diet as tolerated; encourage po intake  Goals: po intake 50-75% of meals; weight maintenance during remainder of admission  Nutrition Goal Status: new  Current  "Diet Order: regular  Chewing or Swallowing Difficulty?: No Chewing or swallowing difficulty  Recommended Diet: Regular  Recommended Oral Supplement: No Oral Supplements  Is Nutrition Support Recommended: [facility] Support: No  Is Nutrition Education Recommended: No    Monitor and Evaluation  % current Intake: Unknown/ No P.O. intake documented  % intake to meet estimated needs: 50 - 75 %  Food and Nutrient Intake: energy intake, food and beverage intake  Food and Nutrient Adminstration: diet order  Anthropometric Measurements: height/length, weight, weight change, body mass index  Biochemical Data, Medical Tests and Procedures: glucose/endocrine profile, gastrointestinal profile, electrolyte and renal panel, inflammatory profile       Current Medical Diagnosis and Past Medical History     Past Medical History:   Diagnosis Date    Bacteriuria 01/20/2024       Nutrition/Diet History  Food Allergies: NKFA    Lab/Procedures/Meds  Recent Labs   Lab 01/22/24  0448      K 4.6   BUN 21*   CREATININE 0.89   CALCIUM 9.9   CL 99     Last A1c: No results found for: "HGBA1C"  Lab Results   Component Value Date    RBC 4.56 01/22/2024    HGB 13.9 01/22/2024    HCT 43.8 01/22/2024    MCV 96.1 (H) 01/22/2024    MCH 30.5 01/22/2024    MCHC 31.7 (L) 01/22/2024     Pertinent Labs Reviewed: reviewed  Pertinent Medications Reviewed: reviewed  Scheduled Meds:   [START ON 1/24/2024] amiodarone  200 mg Oral Daily    amiodarone  400 mg Oral BID    apixaban  5 mg Oral BID    aspirin  81 mg Oral Daily    atorvastatin  20 mg Oral QHS    [START ON 1/23/2024] furosemide  40 mg Oral Daily    levothyroxine  25 mcg Oral Before breakfast    losartan  50 mg Oral Daily    metoprolol succinate  100 mg Oral Daily     Continuous Infusions:  PRN Meds:.albuterol-ipratropium, dextrose 10%, dextrose 10%, docusate sodium, glucagon (human recombinant), insulin aspart U-100, ondansetron, polyethylene glycol, sodium chloride 0.9%, " "traZODone      Anthropometrics  Temp: 97.9 °F (36.6 °C)  Height Method: Stated  Height: 5' 6" (167.6 cm)  Height (inches): 66 in  Weight Method: Bed Scale  Weight: 76.2 kg (167 lb 15.9 oz)  Weight (lb): 167.99 lb  Ideal Body Weight (IBW), Female: 130 lb  % Ideal Body Weight, Female (lb): 119.23 %  BMI (Calculated): 27.1  BMI Grade: 25 - 29.9 - overweight (wnl per geriatric standards)       Estimated/Assessed Needs      Temp: 97.9 °F (36.6 °C)Oral  Weight Used For Calorie Calculations: 75.8 kg (167 lb)   Energy Need Method: Kcal/kg Energy Calorie Requirements (kcal): 2932-2107  Weight Used For Protein Calculations: 75.8 kg (167 lb)  Protein Requirements: 76-91  Estimated Fluid Requirement Method: RDA Method    RDA Method (mL): 1515       Nutrition by Nursing  Diet/Nutrition Received: consistent carb/diabetic diet  Intake (%): 75%  Diet/Feeding Assistance: tray set-up     Last Bowel Movement: 01/21/24                Nutrition Follow-Up  RD Follow-up?: Yes      Nutrition Discharge Planning: noted plans for SWB: continue diet as tolerated            Available via Secure Chat  "

## 2024-01-22 NOTE — PLAN OF CARE
Problem: Adult Inpatient Plan of Care  Goal: Plan of Care Review  Outcome: Ongoing, Progressing  Goal: Patient-Specific Goal (Individualized)  Outcome: Ongoing, Progressing  Goal: Absence of Hospital-Acquired Illness or Injury  Outcome: Ongoing, Progressing  Goal: Optimal Comfort and Wellbeing  Outcome: Ongoing, Progressing  Goal: Readiness for Transition of Care  Outcome: Ongoing, Progressing     Problem: Infection  Goal: Absence of Infection Signs and Symptoms  Outcome: Ongoing, Progressing     Problem: Noninvasive Ventilation Acute  Goal: Effective Unassisted Ventilation and Oxygenation  Outcome: Ongoing, Progressing     Problem: Infection  Goal: Absence of Infection Signs and Symptoms  Outcome: Ongoing, Progressing     Problem: Noninvasive Ventilation Acute  Goal: Effective Unassisted Ventilation and Oxygenation  Outcome: Ongoing, Progressing     Problem: Fall Injury Risk  Goal: Absence of Fall and Fall-Related Injury  Outcome: Ongoing, Progressing     Problem: Gas Exchange Impaired  Goal: Optimal Gas Exchange  Outcome: Ongoing, Progressing     Problem: Skin Injury Risk Increased  Goal: Skin Health and Integrity  Outcome: Ongoing, Progressing

## 2024-01-22 NOTE — PROGRESS NOTES
[facility] - [facility] Telemetry  Cardiology  Progress Note    Patient Name: Windy Swann  MRN: [id number]  Admission Date: 1/14/2024  Hospital Length of Stay: 8 days  Code Status: DNR   Attending Physician: Speedy Andrade Jr., MD   Primary Care Physician: Alyssa, Primary Doctor  Expected Discharge Date:   Principal Problem:Acute systolic (congestive) heart failure    Subjective:       Interval History:   1/21- NAEO. Afib rate-control has improved averaging <100 bpm. Overall feels significantly better compared to when she came. Still on supplemental O2 4 L. No significant dyspnea at rest.     On losartan 50 mg, Toprol 100 mg daily and oral amiodarone   On IV lasix 40 mg daily. Changed Lovenox to eliquis 5 mg BID       Review of Systems   Constitutional: Negative for chills, fever, weight gain and weight loss.   Cardiovascular:  Positive for dyspnea on exertion, irregular heartbeat and orthopnea. Negative for chest pain, leg swelling, near-syncope, palpitations, paroxysmal nocturnal dyspnea and syncope.   Respiratory:  Negative for cough, shortness of breath and wheezing.    Gastrointestinal:  Negative for abdominal pain and diarrhea.     Objective:     Vital Signs (Most Recent):  Temp: 97.6 °F (01/22/24 1901)  Pulse: 93 (01/21/24 1901)  Resp: 24 (01/21/24 1901)  BP: 135/78 (01/21/24 1451)  SpO2: 98 % (01/21/24 1451)     Weight: 76.2 kg (167 lb 15.9 oz)  Body mass index is 27.11 kg/m².     SpO2: 95 %         Intake/Output Summary (Last 24 hours) at 1/22/2024 1723  Last data filed at 1/22/2024 0522  Gross per 24 hour   Intake 480 ml   Output 400 ml   Net 80 ml         Lines/Drains/Airways       Peripheral Intravenous Line  Duration                  Peripheral IV - Single Lumen 01/14/24 1800 20 G Anterior;Left Forearm 7 days         Peripheral IV - Single Lumen 01/16/24 1857 20 G Anterior;Distal;Right Upper Arm 5 days                       Physical Exam  Vitals reviewed.   Constitutional:       General:  "She is not in acute distress.  Cardiovascular:      Rate and Rhythm: Normal rate. Rhythm irregular.      Heart sounds: Murmur heard.   Pulmonary:      Effort: Pulmonary effort is normal.      Breath sounds: Rales present. No wheezing or rhonchi.   Abdominal:      General: Bowel sounds are normal.      Palpations: Abdomen is soft.   Musculoskeletal:      Right lower leg: No edema.      Left lower leg: No edema.   Skin:     General: Skin is warm and dry.   Neurological:      Mental Status: She is alert and oriented to person, place, and time.            Significant Labs: ABG: No results for input(s): "PH", "PCO2", "HCO3", "POCSATURATED", "BE" in the last 48 hours., Blood Culture: No results for input(s): "LABBLOO" in the last 48 hours., BMP:   Recent Labs   Lab 01/21/24 0319 01/22/24  0448   * 104    138   K 4.0 4.6    99   CO2 34* 36*   BUN 23* 21*   CREATININE 0.84 0.89   CALCIUM 9.5 9.9   MG 2.6* 2.6*     , CMP   Recent Labs   Lab 01/21/24  0319 01/22/24  0448    138   K 4.0 4.6    99   CO2 34* 36*   * 104   BUN 23* 21*   CREATININE 0.84 0.89   CALCIUM 9.5 9.9   ANIONGAP 5* 8     , CBC   Recent Labs   Lab 01/21/24 0319 01/22/24  0448   WBC 7.52 8.87   HGB 12.9 13.9   HCT 39.4 43.8    294     , INR No results for input(s): "INR", "PROTIME" in the last 48 hours., Lipid Panel No results for input(s): "CHOL", "HDL", "LDLCALC", "TRIG", "CHOLHDL" in the last 48 hours., and Troponin No results for input(s): "TROPONINI" in the last 48 hours.    Significant Imaging: Echocardiogram: Transthoracic echo (TTE) complete (Cupid Only):   Results for orders placed or performed during the hospital encounter of 01/14/24   Echo   Result Value Ref Range    BSA 1.81 m2    LVOT stroke volume 62.42 cm3    LVIDd 4.30 3.5 - 6.0 cm    LV Systolic Volume 55.08 mL    LV Systolic Volume Index 30.8 mL/m2    LVIDs 3.62 2.1 - 4.0 cm    LV Diastolic Volume 82.92 mL    LV Diastolic Volume Index 46.32 " mL/m2    IVS 1.48 (A) 0.6 - 1.1 cm    LVOT diameter 1.96 cm    LVOT area 3.0 cm2    FS 16 (A) 28 - 44 %    Left Ventricle Relative Wall Thickness 0.38 cm    Posterior Wall 0.82 0.6 - 1.1 cm    LV mass 173.65 g    LV Mass Index 97 g/m2    TR Max Florentino 2.99 m/s    LVOT peak florentino 0.98 m/s    Left Ventricular Outflow Tract Mean Velocity 0.58 cm/s    Left Ventricular Outflow Tract Mean Gradient 1.65 mmHg    Right ventricular length in diastole (apical 4-chamber view) 6.45 cm    RV mid diameter 2.27 cm    TAPSE 1.49 cm    LA size 4.8 cm    RA area 22.0 cm2    AV mean gradient 5 mmHg    AV peak gradient 8 mmHg    Ao peak florentino 1.44 m/s    Ao VTI 29.60 cm    LVOT peak VTI 20.70 cm    AV valve area 2.11 cm²    AV Velocity Ratio 0.68     AV index (prosthetic) 0.70     PRIYANK by Velocity Ratio 2.05 cm²    Triscuspid Valve Regurgitation Peak Gradient 36 mmHg    PV PEAK VELOCITY 0.82 m/s    PV peak gradient 3 mmHg    Ao root annulus 2.60 cm    IVC diameter 2.11 cm    ZLVIDS 1.33     ZLVIDD -1.40     RVDD 3.50 cm    TV resting pulmonary artery pressure 51 mmHg    RV TB RVSP 18 mmHg    Est. RA pres 15 mmHg    Guerrero's Biplane MOD Ejection Fraction 24 %    Narrative      Left Ventricle: The left ventricle is normal in size. Normal wall   thickness. There is eccentric hypertrophy. Regional wall motion   abnormalities present. Anterior and amy-septal wall appears akinetic   There is severely reduced systolic function with a visually estimated   ejection fraction of 20 - 25%. Biplane (2D) method of discs ejection   fraction is 24%. Unable to assess diastolic function due to atrial   fibrillation.    Right Ventricle: Normal right ventricular cavity size. Systolic   function is normal.    Left Atrium: Left atrium is severely dilated.    Right Atrium: Right atrium is severely dilated.    Aortic Valve: The aortic valve is a trileaflet valve.    Mitral Valve: There is posterior leaflet sclerosis. There is annular   dilation present. There is  severe regurgitation.    Tricuspid Valve: There is severe regurgitation.    Pulmonic Valve: There is mild regurgitation.    Pulmonary Artery: The estimated pulmonary artery systolic pressure is   51 mmHg.    IVC/SVC: Elevated venous pressure at 15 mmHg.    ECHO suggestive of restrictive cardiomyopathy with severe systolic   dysfunction and severe mitral regurgitation             Assessment and Plan:     Brief HPI: 89 y/o female with no significant PMH (last seen by pcp a few years back before he retired) who presented to an outside hospital and was noted to be in acute respiratory distress we will with tachypnea, hypoxia with SpO2 85% and found to be in AFib with RVR rates to 150s with no hypotension. While at outside hospital she was started on non-rebreather with course notable for progression in her respiratory distress with plan for transfer in-house for higher level of care.  While there, she received management including diltiazem 5 mg (dose interrupted due to concern for heart failure), digoxin 500 mcg, Lasix 20 mg IV, ticagrelor load 180 mg and transferred to our ED. On presentation here, she was noted to be in respiratory distress following CTA chest and was initiated on BiPAP.      Labs are notable for unremarkable CBC, creatinine 0.92, mild transaminitis with AST/ALT 43/73, NT pro BNP 74218, free T4 within normal limits at 1.05 and troponin flat x2 (104, 109).  Imaging with CTA chest notable for compressive atelectasis versus consolidation in right lower lobe and bilateral right greater than left pleural effusion, LA and RA dilation and no PE.      1/16/2024:  Cardiology consulted for atrial fibrillation with RVR, hypertensive urgency and suspected new onset heart failure with systolic dysfunction. Patient seen today, sitting up in chair, AAO x 3. States she lives at home alone in [place] and has no family close by, but does have a stepdaughter who lives in [place]. According to patient, she quit driving  in December and has a friend who delivers her groceries. States she has still been cutting her grass with a riding  until it recently got cold. She appears to be a very functional 89 y/o until recently declining over the past month or so.        New-onset systolic heart failure (LVEF 20-25%)  New-onset atrial fibrillation  Severe mitral regurgitation      1/16/24: Echo personally reviewed: Findings discussed with patient in detail. Patient was living independently and was functional up until almost 4-6 weeks back. I had a detailed discussion with her regarding her wishes pertaining to invasive cardiac procedures (including cardiac catheterization, CRISTÓBAL etc). She does not wish to proceed with invasive procedures and also noted that she is ' ready to go'. She does not have family who live close or who are involved in her care but she demonstrates complete understanding and medical decision making capacity. Plan for medical management of her cardiomyopathy and mitral regurgitation.     1/17/24 : Afib with RVR with minimal activity. Discussed attempt at CRISTÓBAL w/ cardioversion, though low likelihood of staying in sinus rhythm due to severe MR and severe LA enlargement. Pt would like to continue medical management for now, but may consider procedure if unable to control heart rate. Had a long detailed discussion regarding goals of care and patient's expectations, including home hospice as option.      1/18/24: Improved rate-control at rest. No plan for CRISTÓBAL/CV. Pt would like to hold off. Invasive procedures are not aligned with her goals of care.     1/21/24: Transferred out of ICU to floor 1/20. Doing better, overall. Afib rate-control has improved averaging <100 bpm on tele. Overall feels significantly better compared to when she came. However, still on supplemental O2 4 L. No significant dyspnea at rest.     On losartan 50 mg, Toprol 100 mg daily.   If BP allows, recommend increasing losartan to 100 mg daily for  after load reduction   On oral amiodarone.   On IV lasix 40 mg daily-> changed to PO lasix 40 mg daily from tomorrow   Changed Lovenox to eliquis 5 mg BID     Patient discussed today not wanting to go through this again. 'I don't want to be brought to the hospital and go through this again'. She wants to be at home. I discussed with her that given her wishes and given the severity of her underlying cardiac disease, home hospice would be a good fit for her. She talks about home health vs hospice and needed more clarity. I have discussed differences between the two with her. Recommend continued discussion re: goals of care. She is a DNR and does not wish to undergo heroic measures or invasive procedures.     Cardiology will sign off. Please feel free to contact with any questions or concerns.       VTE Risk Mitigation (From admission, onward)           Ordered     apixaban tablet 5 mg  2 times daily         01/21/24 1202     IP VTE HIGH RISK PATIENT  Once         01/14/24 1751     Place sequential compression device  Until discontinued         01/14/24 1751                    KENTRELL COOPER MD  Cardiology  [facility] - [address] [place] [address]

## 2024-01-22 NOTE — SUBJECTIVE & OBJECTIVE
Interval History:   1/21- NAEO. Afib rate-control has improved averaging <100 bpm. Overall feels significantly better compared to when she came. Still on supplemental O2 4 L. No significant dyspnea at rest.     On losartan 50 mg, Toprol 50 mg daily and oral amiodarone   On IV lasix 40 mg daily. Changed Lovenox to eliquis 5 mg BID           Review of Systems   Constitutional: Negative for chills, fever, weight gain and weight loss.   Cardiovascular:  Positive for dyspnea on exertion, irregular heartbeat and orthopnea. Negative for chest pain, leg swelling, near-syncope, palpitations, paroxysmal nocturnal dyspnea and syncope.   Respiratory:  Negative for cough, shortness of breath and wheezing.    Gastrointestinal:  Negative for abdominal pain and diarrhea.     Objective:     Vital Signs (Most Recent):  Temp: 97.6 °F (01/22/24 1901)  Pulse: 93 (01/21/24 1901)  Resp: 24 (01/21/24 1901)  BP: 135/78 (01/21/24 1451)  SpO2: 98 % (01/21/24 1451)     Weight: 76.2 kg (167 lb 15.9 oz)  Body mass index is 27.11 kg/m².     SpO2: 95 %         Intake/Output Summary (Last 24 hours) at 1/22/2024 1723  Last data filed at 1/22/2024 0522  Gross per 24 hour   Intake 480 ml   Output 400 ml   Net 80 ml         Lines/Drains/Airways       Peripheral Intravenous Line  Duration                  Peripheral IV - Single Lumen 01/14/24 1800 20 G Anterior;Left Forearm 7 days         Peripheral IV - Single Lumen 01/16/24 1857 20 G Anterior;Distal;Right Upper Arm 5 days                       Physical Exam  Vitals reviewed.   Constitutional:       General: She is not in acute distress.  Cardiovascular:      Rate and Rhythm: Normal rate. Rhythm irregular.      Heart sounds: Murmur heard.   Pulmonary:      Effort: Pulmonary effort is normal.      Breath sounds: Rales present. No wheezing or rhonchi.   Abdominal:      General: Bowel sounds are normal.      Palpations: Abdomen is soft.   Musculoskeletal:      Right lower leg: No edema.      Left lower  "leg: No edema.   Skin:     General: Skin is warm and dry.   Neurological:      Mental Status: She is alert and oriented to person, place, and time.            Significant Labs: ABG: No results for input(s): "PH", "PCO2", "HCO3", "POCSATURATED", "BE" in the last 48 hours., Blood Culture: No results for input(s): "LABBLOO" in the last 48 hours., BMP:   Recent Labs   Lab 01/21/24 0319 01/22/24  0448   * 104    138   K 4.0 4.6    99   CO2 34* 36*   BUN 23* 21*   CREATININE 0.84 0.89   CALCIUM 9.5 9.9   MG 2.6* 2.6*     , CMP   Recent Labs   Lab 01/21/24 0319 01/22/24  0448    138   K 4.0 4.6    99   CO2 34* 36*   * 104   BUN 23* 21*   CREATININE 0.84 0.89   CALCIUM 9.5 9.9   ANIONGAP 5* 8     , CBC   Recent Labs   Lab 01/21/24 0319 01/22/24 0448   WBC 7.52 8.87   HGB 12.9 13.9   HCT 39.4 43.8    294     , INR No results for input(s): "INR", "PROTIME" in the last 48 hours., Lipid Panel No results for input(s): "CHOL", "HDL", "LDLCALC", "TRIG", "CHOLHDL" in the last 48 hours., and Troponin No results for input(s): "TROPONINI" in the last 48 hours.    Significant Imaging: Echocardiogram: Transthoracic echo (TTE) complete (Cupid Only):   Results for orders placed or performed during the hospital encounter of 01/14/24   Echo   Result Value Ref Range    BSA 1.81 m2    LVOT stroke volume 62.42 cm3    LVIDd 4.30 3.5 - 6.0 cm    LV Systolic Volume 55.08 mL    LV Systolic Volume Index 30.8 mL/m2    LVIDs 3.62 2.1 - 4.0 cm    LV Diastolic Volume 82.92 mL    LV Diastolic Volume Index 46.32 mL/m2    IVS 1.48 (A) 0.6 - 1.1 cm    LVOT diameter 1.96 cm    LVOT area 3.0 cm2    FS 16 (A) 28 - 44 %    Left Ventricle Relative Wall Thickness 0.38 cm    Posterior Wall 0.82 0.6 - 1.1 cm    LV mass 173.65 g    LV Mass Index 97 g/m2    TR Max Florentino 2.99 m/s    LVOT peak florentino 0.98 m/s    Left Ventricular Outflow Tract Mean Velocity 0.58 cm/s    Left Ventricular Outflow Tract Mean Gradient 1.65 mmHg "    Right ventricular length in diastole (apical 4-chamber view) 6.45 cm    RV mid diameter 2.27 cm    TAPSE 1.49 cm    LA size 4.8 cm    RA area 22.0 cm2    AV mean gradient 5 mmHg    AV peak gradient 8 mmHg    Ao peak hood 1.44 m/s    Ao VTI 29.60 cm    LVOT peak VTI 20.70 cm    AV valve area 2.11 cm²    AV Velocity Ratio 0.68     AV index (prosthetic) 0.70     PRIYANK by Velocity Ratio 2.05 cm²    Triscuspid Valve Regurgitation Peak Gradient 36 mmHg    PV PEAK VELOCITY 0.82 m/s    PV peak gradient 3 mmHg    Ao root annulus 2.60 cm    IVC diameter 2.11 cm    ZLVIDS 1.33     ZLVIDD -1.40     RVDD 3.50 cm    TV resting pulmonary artery pressure 51 mmHg    RV TB RVSP 18 mmHg    Est. RA pres 15 mmHg    Guerrero's Biplane MOD Ejection Fraction 24 %    Narrative      Left Ventricle: The left ventricle is normal in size. Normal wall   thickness. There is eccentric hypertrophy. Regional wall motion   abnormalities present. Anterior and amy-septal wall appears akinetic   There is severely reduced systolic function with a visually estimated   ejection fraction of 20 - 25%. Biplane (2D) method of discs ejection   fraction is 24%. Unable to assess diastolic function due to atrial   fibrillation.    Right Ventricle: Normal right ventricular cavity size. Systolic   function is normal.    Left Atrium: Left atrium is severely dilated.    Right Atrium: Right atrium is severely dilated.    Aortic Valve: The aortic valve is a trileaflet valve.    Mitral Valve: There is posterior leaflet sclerosis. There is annular   dilation present. There is severe regurgitation.    Tricuspid Valve: There is severe regurgitation.    Pulmonic Valve: There is mild regurgitation.    Pulmonary Artery: The estimated pulmonary artery systolic pressure is   51 mmHg.    IVC/SVC: Elevated venous pressure at 15 mmHg.    ECHO suggestive of restrictive cardiomyopathy with severe systolic   dysfunction and severe mitral regurgitation

## 2024-01-23 ENCOUNTER — HOSPITAL ENCOUNTER (INPATIENT)
Facility: HOSPITAL | Age: 89
LOS: 10 days | Discharge: NURSING, REHAB OR LONG-TERM CARE | DRG: 948 | End: 2024-02-02
Attending: FAMILY MEDICINE | Admitting: FAMILY MEDICINE
Payer: MEDICARE

## 2024-01-23 VITALS
TEMPERATURE: 98 F | SYSTOLIC BLOOD PRESSURE: 106 MMHG | WEIGHT: 167.13 LBS | OXYGEN SATURATION: 97 % | DIASTOLIC BLOOD PRESSURE: 59 MMHG | HEIGHT: 66 IN | HEART RATE: 89 BPM | BODY MASS INDEX: 26.86 KG/M2 | RESPIRATION RATE: 20 BRPM

## 2024-01-23 DIAGNOSIS — E03.9 HYPOTHYROIDISM, UNSPECIFIED TYPE: ICD-10-CM

## 2024-01-23 DIAGNOSIS — I50.22 CHRONIC SYSTOLIC CONGESTIVE HEART FAILURE: ICD-10-CM

## 2024-01-23 DIAGNOSIS — I34.0 SEVERE MITRAL REGURGITATION: ICD-10-CM

## 2024-01-23 DIAGNOSIS — R53.1 GENERALIZED WEAKNESS: ICD-10-CM

## 2024-01-23 DIAGNOSIS — I48.91 ATRIAL FIBRILLATION WITH RAPID VENTRICULAR RESPONSE: Primary | ICD-10-CM

## 2024-01-23 PROBLEM — I50.21 ACUTE SYSTOLIC (CONGESTIVE) HEART FAILURE: Status: RESOLVED | Noted: 2024-01-20 | Resolved: 2024-01-23

## 2024-01-23 PROBLEM — I50.20 SYSTOLIC CONGESTIVE HEART FAILURE: Status: ACTIVE | Noted: 2024-01-23

## 2024-01-23 LAB
ANION GAP SERPL CALCULATED.3IONS-SCNC: 7 MMOL/L (ref 7–16)
BASOPHILS # BLD AUTO: 0.1 K/UL (ref 0–0.2)
BASOPHILS NFR BLD AUTO: 1.1 % (ref 0–1)
BUN SERPL-MCNC: 23 MG/DL (ref 7–18)
BUN/CREAT SERPL: 27 (ref 6–20)
CALCIUM SERPL-MCNC: 9.4 MG/DL (ref 8.5–10.1)
CHLORIDE SERPL-SCNC: 100 MMOL/L (ref 98–107)
CO2 SERPL-SCNC: 36 MMOL/L (ref 21–32)
CREAT SERPL-MCNC: 0.86 MG/DL (ref 0.55–1.02)
DIFFERENTIAL METHOD BLD: ABNORMAL
EGFR (NO RACE VARIABLE) (RUSH/TITUS): 64 ML/MIN/1.73M2
EOSINOPHIL # BLD AUTO: 0.18 K/UL (ref 0–0.5)
EOSINOPHIL NFR BLD AUTO: 2 % (ref 1–4)
ERYTHROCYTE [DISTWIDTH] IN BLOOD BY AUTOMATED COUNT: 12.8 % (ref 11.5–14.5)
GLUCOSE SERPL-MCNC: 110 MG/DL (ref 70–105)
GLUCOSE SERPL-MCNC: 139 MG/DL (ref 70–105)
GLUCOSE SERPL-MCNC: 79 MG/DL (ref 74–106)
GLUCOSE SERPL-MCNC: 99 MG/DL (ref 70–105)
HCT VFR BLD AUTO: 39.9 % (ref 38–47)
HGB BLD-MCNC: 12.9 G/DL (ref 12–16)
IMM GRANULOCYTES # BLD AUTO: 0.05 K/UL (ref 0–0.04)
IMM GRANULOCYTES NFR BLD: 0.6 % (ref 0–0.4)
LYMPHOCYTES # BLD AUTO: 2.03 K/UL (ref 1–4.8)
LYMPHOCYTES NFR BLD AUTO: 23.1 % (ref 27–41)
MAGNESIUM SERPL-MCNC: 2.6 MG/DL (ref 1.7–2.3)
MCH RBC QN AUTO: 30.7 PG (ref 27–31)
MCHC RBC AUTO-ENTMCNC: 32.3 G/DL (ref 32–36)
MCV RBC AUTO: 95 FL (ref 80–96)
MONOCYTES # BLD AUTO: 0.61 K/UL (ref 0–0.8)
MONOCYTES NFR BLD AUTO: 6.9 % (ref 2–6)
MPC BLD CALC-MCNC: 10.1 FL (ref 9.4–12.4)
NEUTROPHILS # BLD AUTO: 5.83 K/UL (ref 1.8–7.7)
NEUTROPHILS NFR BLD AUTO: 66.3 % (ref 53–65)
NRBC # BLD AUTO: 0 X10E3/UL
NRBC, AUTO (.00): 0 %
PLATELET # BLD AUTO: 273 K/UL (ref 150–400)
POTASSIUM SERPL-SCNC: 3.9 MMOL/L (ref 3.5–5.1)
RBC # BLD AUTO: 4.2 M/UL (ref 4.2–5.4)
SODIUM SERPL-SCNC: 139 MMOL/L (ref 136–145)
WBC # BLD AUTO: 8.8 K/UL (ref 4.5–11)

## 2024-01-23 PROCEDURE — 11000004 HC SNF PRIVATE

## 2024-01-23 PROCEDURE — 27000221 HC OXYGEN, UP TO 24 HOURS

## 2024-01-23 PROCEDURE — 27000944

## 2024-01-23 PROCEDURE — 94761 N-INVAS EAR/PLS OXIMETRY MLT: CPT

## 2024-01-23 PROCEDURE — 83735 ASSAY OF MAGNESIUM: CPT | Performed by: STUDENT IN AN ORGANIZED HEALTH CARE EDUCATION/TRAINING PROGRAM

## 2024-01-23 PROCEDURE — 99239 HOSP IP/OBS DSCHRG MGMT >30: CPT | Mod: ,,, | Performed by: FAMILY MEDICINE

## 2024-01-23 PROCEDURE — 82962 GLUCOSE BLOOD TEST: CPT

## 2024-01-23 PROCEDURE — 25000003 PHARM REV CODE 250: Performed by: NURSE PRACTITIONER

## 2024-01-23 PROCEDURE — 80048 BASIC METABOLIC PNL TOTAL CA: CPT | Performed by: STUDENT IN AN ORGANIZED HEALTH CARE EDUCATION/TRAINING PROGRAM

## 2024-01-23 PROCEDURE — 27000982 HC MATTRESS, MATRIX LAL RENTAL

## 2024-01-23 PROCEDURE — 85025 COMPLETE CBC W/AUTO DIFF WBC: CPT | Performed by: STUDENT IN AN ORGANIZED HEALTH CARE EDUCATION/TRAINING PROGRAM

## 2024-01-23 PROCEDURE — 25000003 PHARM REV CODE 250: Performed by: STUDENT IN AN ORGANIZED HEALTH CARE EDUCATION/TRAINING PROGRAM

## 2024-01-23 PROCEDURE — 25000003 PHARM REV CODE 250: Performed by: FAMILY MEDICINE

## 2024-01-23 RX ORDER — TRAZODONE HYDROCHLORIDE 50 MG/1
25 TABLET ORAL NIGHTLY PRN
Start: 2024-01-23 | End: 2025-01-22

## 2024-01-23 RX ORDER — DOCUSATE SODIUM 100 MG/1
100 CAPSULE, LIQUID FILLED ORAL DAILY PRN
Refills: 0
Start: 2024-01-23

## 2024-01-23 RX ORDER — FUROSEMIDE 40 MG/1
40 TABLET ORAL DAILY
Qty: 30 TABLET | Refills: 11
Start: 2024-01-24 | End: 2025-01-23

## 2024-01-23 RX ORDER — METOPROLOL SUCCINATE 50 MG/1
100 TABLET, EXTENDED RELEASE ORAL DAILY
Status: DISCONTINUED | OUTPATIENT
Start: 2024-01-25 | End: 2024-02-02 | Stop reason: HOSPADM

## 2024-01-23 RX ORDER — ACETAMINOPHEN 325 MG/1
650 TABLET ORAL EVERY 6 HOURS PRN
Status: DISCONTINUED | OUTPATIENT
Start: 2024-01-23 | End: 2024-02-02 | Stop reason: HOSPADM

## 2024-01-23 RX ORDER — ASPIRIN 81 MG/1
81 TABLET ORAL DAILY
Refills: 0
Start: 2024-01-24 | End: 2025-01-23

## 2024-01-23 RX ORDER — AMIODARONE HYDROCHLORIDE 400 MG/1
400 TABLET ORAL 2 TIMES DAILY
Qty: 60 TABLET | Refills: 11 | Status: ON HOLD
Start: 2024-01-23 | End: 2024-02-02 | Stop reason: HOSPADM

## 2024-01-23 RX ORDER — ATORVASTATIN CALCIUM 20 MG/1
20 TABLET, FILM COATED ORAL NIGHTLY
Qty: 90 TABLET | Refills: 3
Start: 2024-01-23 | End: 2025-01-22

## 2024-01-23 RX ORDER — LOSARTAN POTASSIUM 50 MG/1
50 TABLET ORAL DAILY
Qty: 90 TABLET | Refills: 3
Start: 2024-01-24 | End: 2025-01-23

## 2024-01-23 RX ORDER — FUROSEMIDE 40 MG/1
40 TABLET ORAL DAILY
Status: DISCONTINUED | OUTPATIENT
Start: 2024-01-25 | End: 2024-02-02 | Stop reason: HOSPADM

## 2024-01-23 RX ORDER — IPRATROPIUM BROMIDE AND ALBUTEROL SULFATE 2.5; .5 MG/3ML; MG/3ML
3 SOLUTION RESPIRATORY (INHALATION) EVERY 6 HOURS PRN
Status: DISCONTINUED | OUTPATIENT
Start: 2024-01-23 | End: 2024-02-02 | Stop reason: HOSPADM

## 2024-01-23 RX ORDER — METOPROLOL SUCCINATE 100 MG/1
100 TABLET, EXTENDED RELEASE ORAL DAILY
Qty: 30 TABLET | Refills: 11
Start: 2024-01-24 | End: 2025-01-23

## 2024-01-23 RX ORDER — LEVOTHYROXINE SODIUM 25 UG/1
25 TABLET ORAL
Status: DISCONTINUED | OUTPATIENT
Start: 2024-01-25 | End: 2024-02-02 | Stop reason: HOSPADM

## 2024-01-23 RX ORDER — IPRATROPIUM BROMIDE AND ALBUTEROL SULFATE 2.5; .5 MG/3ML; MG/3ML
3 SOLUTION RESPIRATORY (INHALATION) EVERY 6 HOURS PRN
Qty: 75 ML | Refills: 0
Start: 2024-01-23 | End: 2025-01-22

## 2024-01-23 RX ORDER — CALCIUM CARBONATE 200(500)MG
500 TABLET,CHEWABLE ORAL 2 TIMES DAILY PRN
Status: DISCONTINUED | OUTPATIENT
Start: 2024-01-23 | End: 2024-02-02 | Stop reason: HOSPADM

## 2024-01-23 RX ORDER — TALC
6 POWDER (GRAM) TOPICAL NIGHTLY PRN
Status: DISCONTINUED | OUTPATIENT
Start: 2024-01-23 | End: 2024-02-02 | Stop reason: HOSPADM

## 2024-01-23 RX ORDER — DOCUSATE SODIUM 100 MG/1
100 CAPSULE, LIQUID FILLED ORAL DAILY PRN
Status: DISCONTINUED | OUTPATIENT
Start: 2024-01-23 | End: 2024-02-02 | Stop reason: HOSPADM

## 2024-01-23 RX ORDER — AMIODARONE HYDROCHLORIDE 200 MG/1
200 TABLET ORAL DAILY
Qty: 30 TABLET | Refills: 11
Start: 2024-01-24 | End: 2025-01-23

## 2024-01-23 RX ORDER — LOSARTAN POTASSIUM 50 MG/1
50 TABLET ORAL DAILY
Status: DISCONTINUED | OUTPATIENT
Start: 2024-01-25 | End: 2024-02-02 | Stop reason: HOSPADM

## 2024-01-23 RX ORDER — ATORVASTATIN CALCIUM 10 MG/1
20 TABLET, FILM COATED ORAL NIGHTLY
Status: DISCONTINUED | OUTPATIENT
Start: 2024-01-23 | End: 2024-02-02 | Stop reason: HOSPADM

## 2024-01-23 RX ORDER — AMOXICILLIN 250 MG
1 CAPSULE ORAL 2 TIMES DAILY
Status: DISCONTINUED | OUTPATIENT
Start: 2024-01-23 | End: 2024-02-02 | Stop reason: HOSPADM

## 2024-01-23 RX ORDER — LEVOTHYROXINE SODIUM 25 UG/1
25 TABLET ORAL
Qty: 30 TABLET | Refills: 11
Start: 2024-01-24 | End: 2024-03-02 | Stop reason: DRUGHIGH

## 2024-01-23 RX ORDER — AMIODARONE HYDROCHLORIDE 200 MG/1
200 TABLET ORAL DAILY
Status: DISCONTINUED | OUTPATIENT
Start: 2024-01-24 | End: 2024-02-02 | Stop reason: HOSPADM

## 2024-01-23 RX ORDER — ASPIRIN 81 MG/1
81 TABLET ORAL DAILY
Status: DISCONTINUED | OUTPATIENT
Start: 2024-01-25 | End: 2024-02-02 | Stop reason: HOSPADM

## 2024-01-23 RX ADMIN — LEVOTHYROXINE SODIUM 25 MCG: 0.03 TABLET ORAL at 05:01

## 2024-01-23 RX ADMIN — SENNOSIDES AND DOCUSATE SODIUM 1 TABLET: 8.6; 5 TABLET ORAL at 09:01

## 2024-01-23 RX ADMIN — METOPROLOL SUCCINATE 100 MG: 100 TABLET, EXTENDED RELEASE ORAL at 08:01

## 2024-01-23 RX ADMIN — AMIODARONE HYDROCHLORIDE 400 MG: 200 TABLET ORAL at 08:01

## 2024-01-23 RX ADMIN — FUROSEMIDE 40 MG: 40 TABLET ORAL at 08:01

## 2024-01-23 RX ADMIN — LOSARTAN POTASSIUM 50 MG: 50 TABLET, FILM COATED ORAL at 08:01

## 2024-01-23 RX ADMIN — APIXABAN 5 MG: 2.5 TABLET, FILM COATED ORAL at 09:01

## 2024-01-23 RX ADMIN — ATORVASTATIN CALCIUM 20 MG: 10 TABLET, FILM COATED ORAL at 09:01

## 2024-01-23 RX ADMIN — APIXABAN 5 MG: 5 TABLET, FILM COATED ORAL at 08:01

## 2024-01-23 RX ADMIN — ASPIRIN 81 MG: 81 TABLET, COATED ORAL at 08:01

## 2024-01-23 NOTE — PT/OT/SLP EVAL
Physical Therapy Evaluation     Patient Name: Windy Swann   MRN: [id number]  Recent Surgery: * No surgery found *      Recommendations:     Discharge Recommendations: Moderate Intensity Therapy   Discharge Equipment Recommendations: to be determined by next level of care   Barriers to discharge: Increased level of assist and Decreased caregiver support    Assessment:     Windy Swann is a 89 y.o. female admitted with a medical diagnosis of Acute systolic (congestive) heart failure. She presents with the following impairments/functional limitations: weakness, impaired endurance, impaired functional mobility, gait instability, impaired balance, decreased lower extremity function, impaired cardiopulmonary response to activity.     Rehab Prognosis: Good; patient would benefit from acute PT services to address these deficits and reach maximum level of function.    Plan:     During this hospitalization, patient to be seen   to address the above listed problems via gait training, therapeutic activities, therapeutic exercises    Plan of Care Expires: 02/22/24    Subjective     Chief Complaint: acute systolic heart failure  Patient Comments/Goals: agreeable to PT  Pain/Comfort:  Pain Rating 1: 0/10    Social History:  Living Environment: Patient lives alone in a single story home with number of outside stair(s): 3 with rail and number of inside stair(s): 1  Prior Level of Function: Prior to admission, patient was modified independent with ADLs using rolling walker for mobility  Equipment Used at Home: bedside commode, walker, rolling walk in tub  DME owned (not currently used):  none  Assistance Upon Discharge: facility staff    Objective:     Communicated with RN prior to session. Patient found HOB elevated with peripheral IV, telemetry, oxygen, PureWick upon PT entry to room.    General Precautions: Standard, fall   Orthopedic Precautions: N/A   Braces: N/A    Respiratory Status: Nasal cannula, flow 4  L/min    Exams:  Cognition: Patient is oriented to Person, Place, Time, Situation  RLE ROM: WFL  RLE Strength: Deficits: 3+/5  LLE ROM: WFL  LLE Strength: Deficits: 3+/5  Sensation:    -       Intact    Functional Mobility:  Gait belt applied - Yes  Bed Mobility  Supine to Sit: minimum assistance for trunk management  Transfers  Sit to Stand: minimum assistance with hand-held assist and with cues for hand placement and foot placement  Bed to Chair: minimum assistance with hand-held assist and with cues for hand placement and foot placement using Step Transfer  Gait  Patient ambulated 3 steps to recliner with hand-held assist and minimum assistance. Patient demonstrates unsteady gait, wide base of support, flexed posture, and decreased asya. . All lines remained intact throughout ambulation trail and portable Supplemental O2 4L utilized.  Balance  Sitting: stand by assistance  Standing: minimum assistance      Therapeutic Activities and Exercises:   Patient educated on role of acute care PT and PT POC, safety while in hospital including calling nurse for mobility, and call light usage  Patient educated about importance of OOB mobility and remaining up in chair most of the day.  Patient educated about pursed lip breathing technique and cued for use with mobility.      AM-PAC 6 CLICK MOBILITY  Total Score:17    Patient left up in chair with all lines intact, call button in reach, and OTR present.    GOALS:   Multidisciplinary Problems       Physical Therapy Goals          Problem: Physical Therapy    Goal Priority Disciplines Outcome Goal Variances Interventions   Physical Therapy Goal     PT, PT/OT Ongoing, Progressing     Description: Short Term Goals to be met by: 24    Patient will increase functional independence with mobility by performin. Supine to sit with Stand by assist  2. Sit to stand transfer with Stand by assist using Rolling walker  3. Bed to chair transfer with Stand by assist using  Rolling walker  4. Gait  x 100 feet with Stand by assist using Rolling walker  5. Lower extremity exercise program x30 reps per handout, with assistance as needed  6. Pt to negotiate 4 steps with rail with CGA    Long Term Goals to be met by: 2/22/24    Pt will regain full independent functional mobility with Rolling walker to return to home situation and prior activities of daily living.                        History:     Past Medical History:   Diagnosis Date    Bacteriuria 01/20/2024       No past surgical history on file.    Time Tracking:     PT Received On: 01/22/24  PT Start Time: 1510  PT Stop Time: 1525  PT Total Time (min): 15 min     Billable Minutes: Evaluation moderate complexity    1/22/2024

## 2024-01-23 NOTE — PLAN OF CARE
Problem: Occupational Therapy  Goal: Occupational Therapy Goal  Description: STG:  Pt will be (I) with grooming  Pt will bathe with setup  Pt will perform UE dressing with (I)  Pt will perform LE dressing with I/Mod I  Pt will sit EOB x 10 min (I)  Pt will transfer bed/chair/bsc with CGA with RW  Pt will perform standing task x 2 min with CGA  Pt will tolerate 15 minutes of tx without fatigue      LT.Restore to max I with self care and mobility.     Outcome: Ongoing, Progressing

## 2024-01-23 NOTE — ASSESSMENT & PLAN NOTE
New onset; continue Amiodarone, Metoprolol and Lovenox; followed by Cardiology    1/21 - As above.   1/22 - Rate controlled.

## 2024-01-23 NOTE — ASSESSMENT & PLAN NOTE
New onset; seen by Cardiology on admission; patient is refusing invasive testing for her condition; continue meds per Cardiology; on Lasix for diuresis  ECHO suggestive of restrictive cardiomyopathy with severe systolic dysfunction and severe mitral regurgitation; EF around 20-25%    1/22 - Discussed with cardiology.  Close to optimized. Hospice appropriate.  Would benefit from swing bed first. Lives alone currently.

## 2024-01-23 NOTE — DISCHARGE SUMMARY
[facility] - [address] [place] [address] Medicine  Discharge Summary      Patient Name: Windy Swann  MRN: [id number]  NATALY: [id number]  Patient Class: IP- Inpatient  Admission Date: 1/14/2024  Hospital Length of Stay: 9 days  Discharge Date and Time: No discharge date for patient encounter.  Attending Physician: Speedy Andrade Jr., MD   Discharging Provider: Speedy Andrade Jr, MD  Primary Care Provider: Alyssa, Primary Doctor    Primary Care Team: Networked reference to record PCT     HPI:   No notes on file    * No surgery found *      Hospital Course:   89 year old female with an extensive PMH presents to the hospital with shortness of breath and chest pain.  She was found to be in atrial fibrillation on admission and placed on an Amiodarone drip. She was placed in the ICU on a NTG drip for her HTN.  She was also placed on Bipap.  She was diuresed.  The patient told Cardiology she did not want invasive/extensive testing for her medical conditions.   She has new onset afib, systolic CHF, and severe MR.  She has been optimized medically.. Would benefit from swing bed services to maximize her function.  She lives alone in [place].  She is being transferred to [place] for swing. At the end of that stay would likely benefit from transition to home hospice.  Other option would be home health.       Goals of Care Treatment Preferences:  Code Status: DNR      Consults:   Consults (From admission, onward)          Status Ordering Provider     Inpatient consult to Cardiology  Once        Provider:  Jean-Pierre Ulloa MD    Completed ABDULLAHI COOPER H            Pulmonary  Acute respiratory failure with hypoxia  Has systolic CHF and fluid overload; on Lasix IV; possible pneumonia on CT chest--she was given abx earlier in her hospital stay but the abx were discontinued in the ICU due to the suspicion that the infiltrates were fluid from CHF and not infection; on 4L O2    1/21 - Not SOB at rest. Mobilize  tomorrow.     1/22 - ARF resolved.     Cardiac/Vascular  * Acute systolic (congestive) heart failure  New onset; seen by Cardiology on admission; patient is refusing invasive testing for her condition; continue meds per Cardiology; on Lasix for diuresis  ECHO suggestive of restrictive cardiomyopathy with severe systolic dysfunction and severe mitral regurgitation; EF around 20-25%    1/22 - Discussed with cardiology.  Close to optimized. Hospice appropriate.  Would benefit from swing bed first. Lives alone currently.     1/23 - Optimized.    Severe mitral regurgitation  Refusing invasive testing; followed by Cardiology; continue current meds.  Hospice appropriate.       Atrial fibrillation with rapid ventricular response  New onset; continue Amiodarone, Metoprolol and Lovenox; followed by Cardiology    1/21 - As above.   1/22 - Rate controlled.   1/23 - Rate controlled with amio and BB    Endocrine  Hypothyroidism  TSH elevated previously but T4 normal; will recheck thyroid labs.     1/21 - TSH increased. FT4 low nl.  There may be some benefit to treating.  Will start synthroid 25 in am.   1/23 - Started synthroid this admission.  Ideally would like to get TSH in 6 weeks depending on her clinical course.         Final Active Diagnoses:    Diagnosis Date Noted POA    PRINCIPAL PROBLEM:  Acute systolic (congestive) heart failure [I50.21] 01/20/2024 Yes    Atrial fibrillation with rapid ventricular response [I48.91] 01/14/2024 Yes    Acute respiratory failure with hypoxia [J96.01] 01/14/2024 Yes    Severe mitral regurgitation [I34.0] 01/16/2024 Yes    Hypothyroidism [E03.9] 01/20/2024 Yes    Hypertensive urgency [I16.0] 01/14/2024 Yes      Problems Resolved During this Admission:    Diagnosis Date Noted Date Resolved POA    Bacteriuria [R82.71] 01/20/2024 01/21/2024 Yes    Acute congestive heart failure [I50.9] 01/14/2024 01/20/2024 Yes    Type 2 MI (myocardial infarction) [I21.A1] 01/14/2024 01/20/2024 Yes        Discharged Condition: fair    Disposition:     Follow Up:    Patient Instructions:   No discharge procedures on file.    Significant Diagnostic Studies: N/A    Pending Diagnostic Studies:       None           Medications:  Reconciled Home Medications:      Medication List        START taking these medications      albuterol-ipratropium 2.5 mg-0.5 mg/3 mL nebulizer solution  Commonly known as: DUO-NEB  Take 3 mLs by nebulization every 6 (six) hours as needed for Wheezing. Rescue     * amiodarone 400 MG tablet  Commonly known as: PACERONE  Take 1 tablet (400 mg total) by mouth 2 (two) times daily.     * amiodarone 200 MG Tab  Commonly known as: PACERONE  Take 1 tablet (200 mg total) by mouth once daily.  Start taking on: January 24, 2024     apixaban 5 mg Tab  Commonly known as: ELIQUIS  Take 1 tablet (5 mg total) by mouth 2 (two) times daily.     aspirin 81 MG EC tablet  Commonly known as: ECOTRIN  Take 1 tablet (81 mg total) by mouth once daily.  Start taking on: January 24, 2024     atorvastatin 20 MG tablet  Commonly known as: LIPITOR  Take 1 tablet (20 mg total) by mouth every evening.     docusate sodium 100 MG capsule  Commonly known as: COLACE  Take 1 capsule (100 mg total) by mouth daily as needed for Constipation.     furosemide 40 MG tablet  Commonly known as: LASIX  Take 1 tablet (40 mg total) by mouth once daily.  Start taking on: January 24, 2024     levothyroxine 25 MCG tablet  Commonly known as: SYNTHROID  Take 1 tablet (25 mcg total) by mouth before breakfast.  Start taking on: January 24, 2024     losartan 50 MG tablet  Commonly known as: COZAAR  Take 1 tablet (50 mg total) by mouth once daily.  Start taking on: January 24, 2024     metoprolol succinate 100 MG 24 hr tablet  Commonly known as: TOPROL-XL  Take 1 tablet (100 mg total) by mouth once daily.  Start taking on: January 24, 2024     traZODone 50 MG tablet  Commonly known as: DESYREL  Take 0.5 tablets (25 mg total) by mouth nightly as  needed for Insomnia.           * This list has 2 medication(s) that are the same as other medications prescribed for you. Read the directions carefully, and ask your doctor or other care provider to review them with you.                STOP taking these medications      ALEVE 220 MG tablet  Generic drug: naproxen sodium     WOMEN'S MULTIVITAMIN ORAL              Indwelling Lines/Drains at time of discharge:   Lines/Drains/Airways       None                   Time spent on the discharge of patient: 40 minutes         Speedy Andrade Jr, MD  Department of Hospital Medicine  [facility] - [facility] Telemetry

## 2024-01-23 NOTE — ADDENDUM NOTE
Encounter addended by: Eugenia Kirby on: 1/23/2024 11:50 AM   Actions taken: SmartForm saved, Flowsheet accepted, Charge Capture section accepted

## 2024-01-23 NOTE — ASSESSMENT & PLAN NOTE
New onset; seen by Cardiology on admission; patient is refusing invasive testing for her condition; continue meds per Cardiology; on Lasix for diuresis  ECHO suggestive of restrictive cardiomyopathy with severe systolic dysfunction and severe mitral regurgitation; EF around 20-25%    1/22 - Discussed with cardiology.  Close to optimized. Hospice appropriate.  Would benefit from swing bed first. Lives alone currently.     1/23 - Optimized.

## 2024-01-23 NOTE — ASSESSMENT & PLAN NOTE
Has refused invasive testing, followed by cardiology. Continue current meds. Hospice appropriate.

## 2024-01-23 NOTE — ASSESSMENT & PLAN NOTE
Refusing invasive testing; followed by Cardiology; continue current meds.  Hospice appropriate.

## 2024-01-23 NOTE — ASSESSMENT & PLAN NOTE
New onset; continue Amiodarone, Metoprolol and Lovenox; followed by Cardiology    1/21 - As above.   1/22 - Rate controlled.   1/23 - Rate controlled with amio and BB

## 2024-01-23 NOTE — ASSESSMENT & PLAN NOTE
TSH elevated previously but T4 normal; will recheck thyroid labs.     1/21 - TSH increased. FT4 low nl.  There may be some benefit to treating.  Will start synthroid 25 in am.   1/23 - Started synthroid this admission.  Ideally would like to get TSH in 6 weeks depending on her clinical course.

## 2024-01-23 NOTE — PLAN OF CARE
[facility] - [facility] Telemetry  Discharge Final Note    Primary Care Provider: No, Primary Doctor    Expected Discharge Date: 1/23/2024    Final Discharge Note (most recent)       Final Note - 01/23/24 1008          Final Note    Assessment Type Final Discharge Note     Anticipated Discharge Disposition Skilled Nursing Facility        Post-Acute Status    Post-Acute Authorization Placement     Post-Acute Placement Status Set-up Complete/Auth obtained     Patient choice form signed by patient/caregiver List with quality metrics by geographic area provided;List from CMS Compare;List from System Post-Acute Care     Discharge Delays None known at this time                 Pt accepted at [facility].    Important Message from Medicare  Important Message from Medicare regarding Discharge Appeal Rights: Given to patient/caregiver, Explained to patient/caregiver, Signed/date by patient/caregiver     Date IMM was signed: 01/23/24  Time IMM was signed: 1007    After-discharge care                Destination       [facility] - SWING BED   Service: Skilled Nursing    [address] [place] [address]   Phone: [phone]

## 2024-01-23 NOTE — ASSESSMENT & PLAN NOTE
"Patient is identified as having Systolic (HFrEF) heart failure that is Chronic. CHF is currently controlled. Latest ECHO performed and demonstrates- Results for orders placed during the hospital encounter of 01/14/24    Echo    Interpretation Summary    Left Ventricle: The left ventricle is normal in size. Normal wall thickness. There is eccentric hypertrophy. Regional wall motion abnormalities present. Anterior and amy-septal wall appears akinetic There is severely reduced systolic function with a visually estimated ejection fraction of 20 - 25%. Biplane (2D) method of discs ejection fraction is 24%. Unable to assess diastolic function due to atrial fibrillation.    Right Ventricle: Normal right ventricular cavity size. Systolic function is normal.    Left Atrium: Left atrium is severely dilated.    Right Atrium: Right atrium is severely dilated.    Aortic Valve: The aortic valve is a trileaflet valve.    Mitral Valve: There is posterior leaflet sclerosis. There is annular dilation present. There is severe regurgitation.    Tricuspid Valve: There is severe regurgitation.    Pulmonic Valve: There is mild regurgitation.    Pulmonary Artery: The estimated pulmonary artery systolic pressure is 51 mmHg.    IVC/SVC: Elevated venous pressure at 15 mmHg.    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 Continue Beta Blocker, ACE/ARB, and Furosemide and monitor clinical status closely. Monitor on telemetry. Patient is on CHF pathway.  Monitor strict Is&Os and daily weights. Continue to stress to patient importance of self efficacy and  on diet for CHF. Last BNP reviewed- and noted below No results for input(s): "BNP", "BNPTRIAGEBLO" in the last 168 hours.  "

## 2024-01-23 NOTE — PLAN OF CARE
Problem: Physical Therapy  Goal: Physical Therapy Goal  Description: Short Term Goals to be met by: 24    Patient will increase functional independence with mobility by performin. Supine to sit with Stand by assist  2. Sit to stand transfer with Stand by assist using Rolling walker  3. Bed to chair transfer with Stand by assist using Rolling walker  4. Gait  x 100 feet with Stand by assist using Rolling walker  5. Lower extremity exercise program x30 reps per handout, with assistance as needed  6. Pt to negotiate 4 steps with rail with CGA    Long Term Goals to be met by: 24    Pt will regain full independent functional mobility with Rolling walker to return to home situation and prior activities of daily living.   Outcome: Ongoing, Progressing     PT eval completed. Please see eval note for details.

## 2024-01-23 NOTE — SUBJECTIVE & OBJECTIVE
Interval History: No complaints. Feels well.     Review of Systems  Objective:     Vital Signs (Most Recent):  Temp: 98.5 °F (36.9 °C) (01/22/24 1451)  Pulse: 98 (01/22/24 1451)  Resp: 18 (01/22/24 1451)  BP: 121/67 (01/22/24 1451)  SpO2: 95 % (01/22/24 1451) Vital Signs (24h Range):  Temp:  [97.6 °F (36.4 °C)-98.5 °F (36.9 °C)] 98.5 °F (36.9 °C)  Pulse:  [] 98  Resp:  [18-23] 18  SpO2:  [95 %-98 %] 95 %  BP: ()/(64-69) 121/67     Weight: 76.2 kg (167 lb 15.9 oz)  Body mass index is 27.11 kg/m².    Intake/Output Summary (Last 24 hours) at 1/22/2024 1901  Last data filed at 1/22/2024 1300  Gross per 24 hour   Intake 1190 ml   Output 400 ml   Net 790 ml         Physical Exam  Vitals reviewed.   Constitutional:       General: She is not in acute distress.     Appearance: She is not toxic-appearing.   HENT:      Head: Normocephalic and atraumatic.   Cardiovascular:      Rate and Rhythm: Normal rate and regular rhythm.      Heart sounds: Murmur heard.   Pulmonary:      Effort: Pulmonary effort is normal.   Abdominal:      General: There is no distension.      Tenderness: There is no abdominal tenderness.   Musculoskeletal:      Right lower leg: No edema.      Left lower leg: No edema.   Neurological:      Mental Status: She is alert.             Significant Labs: All pertinent labs within the past 24 hours have been reviewed.  BMP:   Recent Labs   Lab 01/22/24  0448         K 4.6   CL 99   CO2 36*   BUN 21*   CREATININE 0.89   CALCIUM 9.9   MG 2.6*     CBC:   Recent Labs   Lab 01/21/24  0319 01/22/24  0448   WBC 7.52 8.87   HGB 12.9 13.9   HCT 39.4 43.8    294       Significant Imaging: I have reviewed all pertinent imaging results/findings within the past 24 hours.

## 2024-01-23 NOTE — HPI
89 year old female with an extensive PMH presents to the hospital with shortness of breath and chest pain.  She was found to be in atrial fibrillation on admission and placed on an Amiodarone drip. She was placed in the ICU on a NTG drip for her HTN.  She was also placed on Bipap.  She was diuresed.  The patient told Cardiology she did not want invasive/extensive testing for her medical conditions.   She has new onset afib, systolic CHF, and severe MR.  She has been optimized medically.. Would benefit from swing bed services to maximize her function.  She lives alone in [place]. She is being transferred to [place] for swing. At the end of that stay would likely benefit from transition to home hospice. Other option would be home health.          Above info is from [place] summary [facility] 01/14/24-01/24-01/23/24.  Mrs. Swann was admitted to swing bed here at [facility] around 7 pm last night. On my assessment this morning. She is awake and alert, very talkative. Denies pain and SOB. Has o2 per nc at 2 liters. States she lives alone, uses a walker for ambulation. States she plans to dc home alone.

## 2024-01-23 NOTE — ASSESSMENT & PLAN NOTE
Has systolic CHF and fluid overload; on Lasix IV; possible pneumonia on CT chest--she was given abx earlier in her hospital stay but the abx were discontinued in the ICU due to the suspicion that the infiltrates were fluid from CHF and not infection; on 4L O2    1/21 - Not SOB at rest. Mobilize tomorrow.     1/22 - ARF resolved.

## 2024-01-23 NOTE — SUBJECTIVE & OBJECTIVE
Past Medical History:   Diagnosis Date    Bacteriuria 01/20/2024       No past surgical history on file.    Review of patient's allergies indicates:   Allergen Reactions    Aspirin      Patient denies any rash or airway issue with aspirin       Current Facility-Administered Medications on File Prior to Encounter   Medication    albuterol-ipratropium 2.5 mg-0.5 mg/3 mL nebulizer solution 3 mL    [START ON 1/24/2024] amiodarone tablet 200 mg    amiodarone tablet 400 mg    apixaban tablet 5 mg    aspirin EC tablet 81 mg    atorvastatin tablet 20 mg    dextrose 10% bolus 125 mL 125 mL    dextrose 10% bolus 250 mL 250 mL    docusate sodium capsule 100 mg    furosemide tablet 40 mg    glucagon (human recombinant) injection 1 mg    insulin aspart U-100 injection 0-5 Units    levothyroxine tablet 25 mcg    losartan tablet 50 mg    metoprolol succinate (TOPROL-XL) 24 hr tablet 100 mg    ondansetron injection 4 mg    polyethylene glycol packet 17 g    sodium chloride 0.9% flush 10 mL    trazodone split tablet 25 mg     Current Outpatient Medications on File Prior to Encounter   Medication Sig    albuterol-ipratropium (DUO-NEB) 2.5 mg-0.5 mg/3 mL nebulizer solution Take 3 mLs by nebulization every 6 (six) hours as needed for Wheezing. Rescue    [START ON 1/24/2024] amiodarone (PACERONE) 200 MG Tab Take 1 tablet (200 mg total) by mouth once daily.    amiodarone (PACERONE) 400 MG tablet Take 1 tablet (400 mg total) by mouth 2 (two) times daily.    apixaban (ELIQUIS) 5 mg Tab Take 1 tablet (5 mg total) by mouth 2 (two) times daily.    [START ON 1/24/2024] aspirin (ECOTRIN) 81 MG EC tablet Take 1 tablet (81 mg total) by mouth once daily.    atorvastatin (LIPITOR) 20 MG tablet Take 1 tablet (20 mg total) by mouth every evening.    docusate sodium (COLACE) 100 MG capsule Take 1 capsule (100 mg total) by mouth daily as needed for Constipation.    [START ON 1/24/2024] furosemide (LASIX) 40 MG tablet Take 1 tablet (40 mg total) by  mouth once daily.    [START ON 1/24/2024] levothyroxine (SYNTHROID) 25 MCG tablet Take 1 tablet (25 mcg total) by mouth before breakfast.    [START ON 1/24/2024] losartan (COZAAR) 50 MG tablet Take 1 tablet (50 mg total) by mouth once daily.    [START ON 1/24/2024] metoprolol succinate (TOPROL-XL) 100 MG 24 hr tablet Take 1 tablet (100 mg total) by mouth once daily.    traZODone (DESYREL) 50 MG tablet Take 0.5 tablets (25 mg total) by mouth nightly as needed for Insomnia.    [DISCONTINUED] mv-min/iron/folic/calcium/vitK (WOMEN'S MULTIVITAMIN ORAL) Take 1 tablet by mouth once daily.    [DISCONTINUED] naproxen sodium (ALEVE) 220 MG tablet Take 220 mg by mouth 2 (two) times daily with meals.     Family History    None       Tobacco Use    Smoking status: Not on file    Smokeless tobacco: Not on file   Substance and Sexual Activity    Alcohol use: Not on file    Drug use: Not on file    Sexual activity: Not on file     Review of Systems   Constitutional:  Negative for appetite change and fatigue.   Respiratory:  Negative for cough and shortness of breath.    Cardiovascular:  Negative for chest pain, palpitations and leg swelling.   Gastrointestinal:  Negative for abdominal pain, constipation, diarrhea, nausea and vomiting.   Genitourinary:  Negative for difficulty urinating.   Musculoskeletal:  Positive for arthralgias.        States all joints are affected - but states she is not currently in pain   Skin:  Negative for color change and wound.   Neurological:  Positive for weakness. Negative for light-headedness and headaches.     Objective:     Vital Signs (Most Recent):    Vital Signs (24h Range):  Temp:  [97.4 °F (36.3 °C)-98.5 °F (36.9 °C)] 97.6 °F (36.4 °C)  Pulse:  [] 69  Resp:  [18-20] 18  SpO2:  [95 %-97 %] 97 %  BP: (101-138)/(62-78) 101/68        There is no height or weight on file to calculate BMI.     Physical Exam  HENT:      Head: Normocephalic.      Mouth/Throat:      Mouth: Mucous membranes are  moist.   Cardiovascular:      Rate and Rhythm: Normal rate and regular rhythm.      Heart sounds: Murmur heard.   Pulmonary:      Effort: Pulmonary effort is normal.      Breath sounds: Normal breath sounds.      Comments: O2 per nc at 2 liters  Abdominal:      General: Bowel sounds are normal. There is no distension.      Palpations: Abdomen is soft. There is no mass.      Tenderness: There is no abdominal tenderness. There is no guarding or rebound.      Hernia: No hernia is present.   Musculoskeletal:         General: Normal range of motion.      Cervical back: Normal range of motion.   Skin:     General: Skin is warm and dry.      Coloration: Skin is not jaundiced or pale.      Findings: No bruising or erythema.   Neurological:      Mental Status: She is alert and oriented to person, place, and time.      Motor: Weakness present.   Psychiatric:         Mood and Affect: Mood normal.                Significant Labs: All pertinent labs within the past 24 hours have been reviewed.  Recent Lab Results         01/23/24  1630   01/23/24  1106        POC Glucose 110   139               Significant Imaging: I have reviewed all pertinent imaging results/findings within the past 24 hours.

## 2024-01-23 NOTE — PT/OT/SLP EVAL
Occupational Therapy   Evaluation    Name: Windy Swann  MRN: [id number]  Admitting Diagnosis: Acute systolic (congestive) heart failure  Recent Surgery: * No surgery found *      Recommendations:     Discharge Recommendations: Low Intensity Therapy  Discharge Equipment Recommendations:   (to be determined)  Barriers to discharge:  None    Assessment:     Windy Swann is a 89 y.o. female with a medical diagnosis of Acute systolic (congestive) heart failure.  She presents with no complaint.pt agreed to OT evaluation. Performance deficits affecting function: weakness, impaired endurance, impaired functional mobility, impaired self care skills, gait instability, pain.      Rehab Prognosis: Good; patient would benefit from acute skilled OT services to address these deficits and reach maximum level of function.       Plan:     Patient to be seen 5 x/week to address the above listed problems via self-care/home management, therapeutic activities, therapeutic exercises  Plan of Care Expires:  2/26/24  Plan of Care Reviewed with: patient    Subjective     Chief Complaint: Acute systolic CHF  Patient/Family Comments/goals: Swing bed    Occupational Profile:  Living Environment: Pt lives alone in 1 story home with 3 steps with rail and 1 step inside  Previous level of function: Pt reports being (I) with self care prior  Roles and Routines: I with daily activities prior  Equipment Used at Home: walker, rolling, walk in shower  Assistance upon Discharge: swing bed staff    Pain/Comfort:  Pain Rating 1: 0/10  Location - Side 1:  Location 1:   Pain Addressed 1:  Pain Rating Post-Intervention 1: 0/10    Patients cultural, spiritual, Buddhism conflicts given the current situation: no    Objective:     Communicated with: PT Mirella Polanco who consulted with nurse prior to session.  Patient found up in chair with blood pressure cuff, peripheral IV, PureWick, pulse ox (continuous), telemetry upon OT entry to room.    General  Precautions: Standard, fall  Orthopedic Precautions: N/A  Braces: N/A  Respiratory Status: Nasal cannula, flow 3 L/min    Occupational Performance:    Bed Mobility:    Patient completed Supine to Sit with minimum assistance and per PT    Functional Mobility/Transfers:  Patient completed Sit <> Stand Transfer with minimum assistance  with  gait belt to stand with manual assistance   Functional Mobility: NT    Activities of Daily Living:  Upper Body Dressing: moderate assistance donchristophe shelton as a robe    Cognitive/Visual Perceptual:  Cognitive/Psychosocial Skills:     -       Oriented to: Person, Place, and Situation   -       Follows Commands/attention:Follows one-step commands  -       Communication: clear/fluent  Visual/Perceptual:      -wears glasses. Hearing WFL      Physical Exam:  Balance:    -       Min a with standing balance  Skin integrity: Visible skin intact  Sensation:    -       Intact  Motor Planning:    -       wfl  Dominant hand:    -       Right  Upper Extremity Range of Motion:     -       Right Upper Extremity: WFL  -       Left Upper Extremity: WFL  Upper Extremity Strength:    -       Right Upper Extremity: WFL  -       Left Upper Extremity: WFL    AMPAC 6 Click ADL:  AMPAC Total Score: 15    Treatment & Education:  OT evaluation completed. See eval for details. Pt present with decline in status. Tx plan to focus on increasing (I).  Pt educated on OT role/POC.   Importance of OOB activity .  Importance of sitting up in the chair.  Safety during functional t/f and mobility with use of RW  Importance of assisting with self-care activities   All questions/concerns answered within OT scope of practice     Patient left up in chair with all lines intact and call button in reach    GOALS:   Multidisciplinary Problems       Occupational Therapy Goals          Problem: Occupational Therapy    Goal Priority Disciplines Outcome Interventions   Occupational Therapy Goal     OT, PT/OT Ongoing, Progressing     Description: STG:  Pt will be (I) with grooming  Pt will bathe with setup  Pt will perform UE dressing with (I)  Pt will perform LE dressing with I/Mod I  Pt will sit EOB x 10 min (I)  Pt will transfer bed/chair/bsc with SBA with RW  Pt will perform standing task x 2 min with SBA  Pt will tolerate 15 minutes of tx without fatigue      LT.Restore to max I with self care and mobility.                          History:     Past Medical History:   Diagnosis Date    Bacteriuria 2024       No past surgical history on file.    Time Tracking:     OT Date of Treatment: 24  OT Start Time: 1524  OT Stop Time: 1538  OT Total Time (min): 14    Billable Minutes:Evaluation 14    2024

## 2024-01-23 NOTE — PROGRESS NOTES
[facility] - [facility] Telemetry Hospital Medicine  Progress Note    Patient Name: Windy Sawnn  MRN: [id number]  Patient Class: IP- Inpatient   Admission Date: 1/14/2024  Length of Stay: 8 days  Attending Physician: Speedy Andrade Jr., MD  Primary Care Provider: Alyssa, Primary Doctor        Subjective:     Principal Problem:Acute systolic (congestive) heart failure        HPI:  No notes on file    Overview/Hospital Course:  89 year old female with an extensive PMH presents to the hospital with shortness of breath and chest pain.  She was found to be in atrial fibrillation on admission and placed on an Amiodarone drip. She was placed in the ICU on a NTG drip for her HTN.  She was also placed on Bipap.  She was diuresed.  The patient told Cardiology she did not want invasive/extensive testing for her medical conditions.   She has new onset afib, systolic CHF, and severe MR.      Interval History: No complaints. Feels well.     Review of Systems  Objective:     Vital Signs (Most Recent):  Temp: 98.5 °F (36.9 °C) (01/22/24 1451)  Pulse: 98 (01/22/24 1451)  Resp: 18 (01/22/24 1451)  BP: 121/67 (01/22/24 1451)  SpO2: 95 % (01/22/24 1451) Vital Signs (24h Range):  Temp:  [97.6 °F (36.4 °C)-98.5 °F (36.9 °C)] 98.5 °F (36.9 °C)  Pulse:  [] 98  Resp:  [18-23] 18  SpO2:  [95 %-98 %] 95 %  BP: ()/(64-69) 121/67     Weight: 76.2 kg (167 lb 15.9 oz)  Body mass index is 27.11 kg/m².    Intake/Output Summary (Last 24 hours) at 1/22/2024 1901  Last data filed at 1/22/2024 1300  Gross per 24 hour   Intake 1190 ml   Output 400 ml   Net 790 ml         Physical Exam  Vitals reviewed.   Constitutional:       General: She is not in acute distress.     Appearance: She is not toxic-appearing.   HENT:      Head: Normocephalic and atraumatic.   Cardiovascular:      Rate and Rhythm: Normal rate and regular rhythm.      Heart sounds: Murmur heard.   Pulmonary:      Effort: Pulmonary effort is normal.   Abdominal:       General: There is no distension.      Tenderness: There is no abdominal tenderness.   Musculoskeletal:      Right lower leg: No edema.      Left lower leg: No edema.   Neurological:      Mental Status: She is alert.             Significant Labs: All pertinent labs within the past 24 hours have been reviewed.  BMP:   Recent Labs   Lab 01/22/24  0448         K 4.6   CL 99   CO2 36*   BUN 21*   CREATININE 0.89   CALCIUM 9.9   MG 2.6*     CBC:   Recent Labs   Lab 01/21/24  0319 01/22/24  0448   WBC 7.52 8.87   HGB 12.9 13.9   HCT 39.4 43.8    294       Significant Imaging: I have reviewed all pertinent imaging results/findings within the past 24 hours.    Assessment/Plan:      * Acute systolic (congestive) heart failure  New onset; seen by Cardiology on admission; patient is refusing invasive testing for her condition; continue meds per Cardiology; on Lasix for diuresis  ECHO suggestive of restrictive cardiomyopathy with severe systolic dysfunction and severe mitral regurgitation; EF around 20-25%    1/22 - Discussed with cardiology.  Close to optimized. Hospice appropriate.  Would benefit from swing bed first. Lives alone currently.     Atrial fibrillation with rapid ventricular response  New onset; continue Amiodarone, Metoprolol and Lovenox; followed by Cardiology    1/21 - As above.   1/22 - Rate controlled.     Acute respiratory failure with hypoxia  Has systolic CHF and fluid overload; on Lasix IV; possible pneumonia on CT chest--she was given abx earlier in her hospital stay but the abx were discontinued in the ICU due to the suspicion that the infiltrates were fluid from CHF and not infection; on 4L O2    1/21 - Not SOB at rest. Mobilize tomorrow.     1/22 - ARF resolved.     Severe mitral regurgitation  Refusing invasive testing; followed by Cardiology; continue current meds.  Hospice appropriate.       Hypothyroidism  TSH elevated previously but T4 normal; will recheck thyroid labs.      1/21 - TSH increased. FT4 low nl.  There may be some benefit to treating.  Will start synthroid 25 in am.       Hypertensive urgency  Resolved; on BP meds; will monitor      VTE Risk Mitigation (From admission, onward)           Ordered     apixaban tablet 5 mg  2 times daily         01/21/24 1202     IP VTE HIGH RISK PATIENT  Once         01/14/24 1751     Place sequential compression device  Until discontinued         01/14/24 1751                    Discharge Planning   SELWYN:      Code Status: DNR   Is the patient medically ready for discharge?:     Reason for patient still in hospital (select all that apply): Treatment  Discharge Plan A: Skilled Nursing Facility   Discharge Delays: None known at this time              Speedy Andrade Jr, MD  Department of Hospital Medicine   [facility] - [address] [place] [address]     Eyes with no visual disturbances.  Ears clean and dry and no hearing difficulties. Nose with pink mucosa and no drainage.  Mouth mucous membranes moist and pink.  No tenderness or swelling to throat or neck.

## 2024-01-23 NOTE — ASSESSMENT & PLAN NOTE
Patient with Paroxysmal (<7 days) atrial fibrillation which is controlled currently with Beta Blocker and Amiodarone. Patient is currently in sinus rhythm.[id number] Score: 3. Anticoagulation indicated. Anticoagulation done with eliquis  .

## 2024-01-24 LAB
BILIRUB UR QL STRIP: NEGATIVE
CLARITY UR: CLEAR
COLOR UR: YELLOW
GLUCOSE UR STRIP-MCNC: NEGATIVE MG/DL
KETONES UR STRIP-SCNC: NEGATIVE MG/DL
LEUKOCYTE ESTERASE UR QL STRIP: NEGATIVE
NITRITE UR QL STRIP: NEGATIVE
PH UR STRIP: 5.5 PH UNITS
PROT UR QL STRIP: NEGATIVE
RBC # UR STRIP: ABNORMAL /UL
RBC #/AREA URNS HPF: ABNORMAL /HPF
SP GR UR STRIP: 1.02
SQUAMOUS #/AREA URNS LPF: ABNORMAL /LPF
UROBILINOGEN UR STRIP-ACNC: 0.2 MG/DL
WBC #/AREA URNS HPF: ABNORMAL /HPF

## 2024-01-24 PROCEDURE — 99305 1ST NF CARE MODERATE MDM 35: CPT | Mod: AI,,, | Performed by: FAMILY MEDICINE

## 2024-01-24 PROCEDURE — 11000004 HC SNF PRIVATE

## 2024-01-24 PROCEDURE — 25000003 PHARM REV CODE 250: Performed by: NURSE PRACTITIONER

## 2024-01-24 PROCEDURE — 27000944

## 2024-01-24 PROCEDURE — 81003 URINALYSIS AUTO W/O SCOPE: CPT | Performed by: NURSE PRACTITIONER

## 2024-01-24 PROCEDURE — 97166 OT EVAL MOD COMPLEX 45 MIN: CPT

## 2024-01-24 PROCEDURE — 27000982 HC MATTRESS, MATRIX LAL RENTAL

## 2024-01-24 PROCEDURE — 25000242 PHARM REV CODE 250 ALT 637 W/ HCPCS: Performed by: NURSE PRACTITIONER

## 2024-01-24 PROCEDURE — 97116 GAIT TRAINING THERAPY: CPT

## 2024-01-24 PROCEDURE — 94640 AIRWAY INHALATION TREATMENT: CPT

## 2024-01-24 PROCEDURE — 27000221 HC OXYGEN, UP TO 24 HOURS

## 2024-01-24 PROCEDURE — 99900035 HC TECH TIME PER 15 MIN (STAT)

## 2024-01-24 PROCEDURE — 94761 N-INVAS EAR/PLS OXIMETRY MLT: CPT

## 2024-01-24 PROCEDURE — 97161 PT EVAL LOW COMPLEX 20 MIN: CPT

## 2024-01-24 RX ADMIN — APIXABAN 5 MG: 2.5 TABLET, FILM COATED ORAL at 08:01

## 2024-01-24 RX ADMIN — ATORVASTATIN CALCIUM 20 MG: 10 TABLET, FILM COATED ORAL at 08:01

## 2024-01-24 RX ADMIN — AMIODARONE HYDROCHLORIDE 200 MG: 200 TABLET ORAL at 08:01

## 2024-01-24 RX ADMIN — IPRATROPIUM BROMIDE AND ALBUTEROL SULFATE 3 ML: .5; 3 SOLUTION RESPIRATORY (INHALATION) at 07:01

## 2024-01-24 RX ADMIN — SENNOSIDES AND DOCUSATE SODIUM 1 TABLET: 8.6; 5 TABLET ORAL at 08:01

## 2024-01-24 NOTE — PT/OT/SLP EVAL
"Physical Therapy Evaluation    Patient Name:  Windy Swann   MRN:  [id number]    Recommendations:     Discharge Recommendations: Moderate Intensity Therapy   Discharge Equipment Recommendations: bedside commode   Barriers to discharge: Decreased caregiver support    Assessment:     Windy Swann is a 89 y.o. female admitted with a medical diagnosis of Generalized weakness.  She presents with the following impairments/functional limitations: weakness, impaired self care skills, impaired functional mobility, impaired balance, decreased lower extremity function, edema, impaired cardiopulmonary response to activity .  She was admitted to [facility] with dx of CHF, A Fib and mitral regurgitation.  Patient lives alone and is seeking either home health or hospice and possible referral to [facility] following discharge.  Will need a new BSC as hers is older and borrowed and may not be safe.    Rehab Prognosis: Good; patient would benefit from acute skilled PT services to address these deficits and reach maximum level of function.    Recent Surgery: * No surgery found *      Plan:     During this hospitalization, patient to be seen 5 x/week to address the identified rehab impairments via gait training, therapeutic activities, therapeutic exercises, neuromuscular re-education and progress toward the following goals:    Plan of Care Expires:  02/14/24    Subjective     Chief Complaint: "My legs feel weak"  Patient/Family Comments/goals: see above   Pain/Comfort:  Pain Rating 1: 0/10    Patients cultural, spiritual, Yazdanism conflicts given the current situation: no    Living Environment:  Patient lives alone and has a friend with assists with getting groceries, going to [employer].    Prior to admission, patients level of function was modified ind using DMES and was ambulating with RW.  Equipment used at home: bedside commode, cane, straight, shower chair, walker, standard.    Upon discharge, patient will have " assistance from her friend,  and is considering going to [facility].    Objective:     Communicated with Nursing  prior to session.  Patient found with bed in chair position with bed alarm, oxygen  upon PT entry to room.    General Precautions: Standard, fall  Orthopedic Precautions:N/A   Braces: N/A  Respiratory Status: Nasal cannula, flow 2 L/min    Exams:  Gross Motor Coordination:  WFL  Skin Integrity/Edema:      -       Edema: Moderate of BL lower legs  RLE ROM: WNL  RLE Strength: WNL  LLE ROM: WNL  LLE Strength: WFL    Functional Mobility:  Bed Mobility:     Rolling Left:  contact guard assistance  Scooting: stand by assistance  Bridging: supervision  Supine to Sit: contact guard assistance  Sit to Supine: minimum assistance  Transfers:     Sit to Stand:  minimum assistance with rolling walker  Toilet Transfer: minimum assistance with  rolling walker and BSC over toilet   using  Step Transfer  Gait: Patient ambulated using RW for approx 12 ft with Min assist       AM-PAC 6 CLICK MOBILITY  Total Score:17       Treatment & Education:  See above    Patient left with bed in chair position with all lines intact, call button in reach, and bed alarm on.      GOALS      Short-term Goals: 2 weeks  1. Patient will perform supine to/from sit with supervision to improve independence and safety with bed mobility.  2. Patient will perform sit to/from stand with a rolling walker with contact guard assist to improve independence and safety with transfers.  3. Patient will ambulate 50 feet with a rolling walker with contact guard assist to improve independence and safety with gait.  4. Patient will tolerate 15 minutes of physical therapy intervention to improve endurance and activity tolerance.    Long-term goals: 3 weeks  1. Patient will perform supine to/from sit with modified independence to improve independence and safety with bed mobility.  2. Patient will perform sit to/from stand with a rolling walker with standby  assist to improve independence and safety with transfers.  3. Patient will ambulate 150 feet with a rolling walker with standby assist to improve independence and safety with gait.  4. Patient will tolerate 30 minutes of physical therapy intervention to improve endurance and activity tolerance.       History:     Past Medical History:   Diagnosis Date    A-fib     Arthritis     Bacteriuria 01/20/2024    Hyperlipidemia     Hypertension     Mitral regurgitation     Systolic heart failure     Thyroid disease        Past Surgical History:   Procedure Laterality Date    APPENDECTOMY      TONSILLECTOMY         Time Tracking:     PT Received On: 01/24/24  PT Start Time: 0900     PT Stop Time: 0935  PT Total Time (min): 35 min     Billable Minutes: Evaluation 20 and Gait Training 15      01/24/2024

## 2024-01-24 NOTE — CONSULTS
"  [facility] - Medical Surgical Unit  Adult Nutrition  Consult Note    SUMMARY     Recommendations    Recommendation/Intervention: 1. Cardiac diet with chopped meats 2. assistance with meal setup  Goals: Meet EEN >75%  Nutrition Goal Status: new    Assessment and Plan    Potential decrease intake R/T chewing difficulty AEB pt interview. dentures       Malnutrition Assessment  Malnutrition Level:  (Based on asessment this pt is not malnourished.)                                    Reason for Assessment    Reason For Assessment: consult (swing bed pt)  Diagnosis:  (CHF, new onset A fib, resp failure, weakness)  Relevant Medical History: CHF, A fib, resp failure, hypothyroidism, HTN  Interdisciplinary Rounds: did not attend  General Information Comments: RDN visited pt this a.m. This pt wears top dentures and stated pt would benefit chopped meats.  Pt needs assistance setting up her tray.  Meal intake ~%.  Last BM .  This pt lives alone and stated that he intake was fair PTA. Wt hard to assess R/T diuresis.  Nutrition Discharge Planning: Home with optimized intake    Nutrition Risk Screen    Nutrition Risk Screen: no indicators present    Nutrition/Diet History    Patient Reported Diet/Restrictions/Preferences: general  Food Preferences: likes milk  Spiritual, Cultural Beliefs, Caodaism Practices, Values that Affect Care: no  Food Allergies: NKFA  Factors Affecting Nutritional Intake: chewing difficulties/inability to chew food    Anthropometrics    Temp: 96 °F (35.6 °C)  Height: 5' 6" (167.6 cm)  Height (inches): 66 in  Weight Method: Standard Scale  Weight: 68.5 kg (151 lb)  Weight (lb): 151 lb  Ideal Body Weight (IBW), Female: 130 lb  % Ideal Body Weight, Female (lb): 116.15 %  BMI (Calculated): 24.4  BMI Grade: 18.5-24.9 - normal       Lab/Procedures/Meds    Pertinent Labs Reviewed: reviewed  Pertinent Labs Comments: B-185  Pertinent Medications Reviewed: reviewed  Pertinent " Medications Comments: Lipitor, Lasix, Synthroid           Estimated/Assessed Needs    Weight Used For Calorie Calculations: 68.5 kg (151 lb)  Energy Calorie Requirements (kcal): 9774-0869  Energy Need Method: Kcal/kg  Protein Requirements: 69-76  Weight Used For Protein Calculations: 68.5 kg (151 lb)     Estimated Fluid Requirement Method: RDA Method  RDA Method (mL): 1712         Nutrition Prescription Ordered    Current Diet Order: Cardiac  Nutrition Order Comments: Pt would benefit from chopped meats.    Evaluation of Received Nutrient/Fluid Intake    Energy Calories Required: meeting needs  Protein Required: meeting needs  Fluid Required: meeting needs  Tolerance: tolerating  % Intake of Estimated Energy Needs: 75 - 100 %  % Meal Intake: 75 - 100 %    Nutrition Risk    Level of Risk/Frequency of Follow-up: moderate       Monitor and Evaluation    Food and Nutrient Intake: food and beverage intake  Food and Nutrient Adminstration: diet order  Anthropometric Measurements: weight  Biochemical Data, Medical Tests and Procedures: electrolyte and renal panel  Nutrition-Focused Physical Findings: overall appearance       Nutrition Follow-Up    RD Follow-up?: Yes

## 2024-01-24 NOTE — ASSESSMENT & PLAN NOTE
"Patient is identified as having Systolic (HFrEF) heart failure that is Chronic. CHF is currently controlled. Latest ECHO performed and demonstrates- Results for orders placed during the hospital encounter of 01/14/24    Echo    Interpretation Summary    Left Ventricle: The left ventricle is normal in size. Normal wall thickness. There is eccentric hypertrophy. Regional wall motion abnormalities present. Anterior and amy-septal wall appears akinetic There is severely reduced systolic function with a visually estimated ejection fraction of 20 - 25%. Biplane (2D) method of discs ejection fraction is 24%. Unable to assess diastolic function due to atrial fibrillation.    Right Ventricle: Normal right ventricular cavity size. Systolic function is normal.    Left Atrium: Left atrium is severely dilated.    Right Atrium: Right atrium is severely dilated.    Aortic Valve: The aortic valve is a trileaflet valve.    Mitral Valve: There is posterior leaflet sclerosis. There is annular dilation present. There is severe regurgitation.    Tricuspid Valve: There is severe regurgitation.    Pulmonic Valve: There is mild regurgitation.    Pulmonary Artery: The estimated pulmonary artery systolic pressure is 51 mmHg.    IVC/SVC: Elevated venous pressure at 15 mmHg.    ECHO suggestive of restrictive cardiomyopathy with severe systolic dysfunction and severe mitral regurgitation  . Continue Beta Blocker, ACE/ARB, and Furosemide and monitor clinical status closely. Monitor on telemetry. Patient is on CHF pathway.  Monitor strict Is&Os and daily weights. Continue to stress to patient importance of self efficacy and  on diet for CHF. Last BNP reviewed- and noted below No results for input(s): "BNP", "BNPTRIAGEBLO" in the last 168 hours.  "

## 2024-01-24 NOTE — PLAN OF CARE
PT Alert and very talkative. Resp even. O2 at 2 liter per nasal cannula. Last BM stated for this morning. Brian heel protectors intact. Bed low. SR up X 3. CB within reach  Problem: Adult Inpatient Plan of Care  Goal: Plan of Care Review  Outcome: Ongoing, Progressing  Goal: Patient-Specific Goal (Individualized)  Outcome: Ongoing, Progressing  Goal: Absence of Hospital-Acquired Illness or Injury  Outcome: Ongoing, Progressing  Goal: Optimal Comfort and Wellbeing  Outcome: Ongoing, Progressing  Goal: Readiness for Transition of Care  Outcome: Ongoing, Progressing     Problem: Fall Injury Risk  Goal: Absence of Fall and Fall-Related Injury  Outcome: Ongoing, Progressing

## 2024-01-24 NOTE — PT/OT/SLP PROGRESS
----- Message from Cindi Schmitt sent at 7/25/2018  7:15 AM CDT -----  Contact: self/[phone]  Patient called to get a same day appointment and declined getting an appointment with another physician other than their own.    Please call and advise.  She is a Medicaid patient and I could not scheduled it.     Occupational Therapy   Treatment    Name: Windy Swann  MRN: [id number]  Admitting Diagnosis:  Generalized weakness       Recommendations:     Discharge Recommendations: Moderate Intensity Therapy  Discharge Equipment Recommendations:  3-in-1 commode  Barriers to discharge:  None    Assessment:     Windy Swann is a 89 y.o. female with a medical diagnosis of Generalized weakness.  She presents with weakness and decline with ADLs. Performance deficits affecting function are weakness, impaired endurance, impaired self care skills, impaired functional mobility, gait instability, impaired balance, decreased safety awareness, decreased lower extremity function, decreased upper extremity function.     Rehab Prognosis:  Fair; patient would benefit from acute skilled OT services to address these deficits and reach maximum level of function.       Plan:     Patient to be seen 5 x/week to address the above listed problems via self-care/home management, therapeutic activities, therapeutic exercises  Plan of Care Expires: 02/07/24  Plan of Care Reviewed with: patient    Subjective     Chief Complaint: Pain and weakness  Patient/Family Comments/goals: to return to prior level of function  Pain/Comfort:  Pain Rating 1: 0/10  Pain Rating Post-Intervention 1: 0/10    Objective:     Communicated with: Nurse prior to session.  Patient found supine with bed alarm, oxygen upon OT entry to room.    General Precautions: Standard, fall    Orthopedic Precautions:N/A  Braces: N/A  Respiratory Status: Nasal cannula     Occupational Performance:     Bed Mobility:    Patient completed Rolling/Turning to Left with  minimum assistance  Patient completed Rolling/Turning to Right with minimum assistance  Patient completed Scooting/Bridging with minimum assistance  Patient completed Supine to Sit with minimum assistance  Patient completed Sit to Supine with minimum assistance     Functional Mobility/Transfers:  Patient completed Sit <> Stand  Transfer with minimum assistance  with  rolling walker   Patient completed Toilet Transfer Step Transfer technique with minimum assistance with  rolling walker  Functional Mobility: Patient transferred within room with minimal difficulty and no unsafe fatigue    Activities of Daily Living:  Grooming: minimum assistance to perform handwashing at sink  Upper Body Dressing: minimum assistance to ame hospital gown  Lower Body Dressing: moderate assistance to ame pant  Toileting: minimum assistance to perform toilet hygiene      AMPAC 6 Click ADL: 17    Treatment & Education:  Pt educated on OT role/POC.   Importance of OOB activity with staff assistance.  Importance of sitting up in the chair throughout the day as tolerated, especially for meals   Safety during functional t/f and mobility  Importance of assisting with self-care activities      Patient left HOB elevated with all lines intact, call button in reach, and bed alarm on    GOALS:   Multidisciplinary Problems       Occupational Therapy Goals          Problem: Occupational Therapy    Goal Priority Disciplines Outcome Interventions   Occupational Therapy Goal     OT, PT/OT Ongoing, Progressing    Description: Grooming Status:   Short Term Goal: Pt will perform grooming with SBA sitting EOB.   Long Term Goal: Pt will perform grooming/oral hygiene standing at sink with SBA      LE dressing Status:   Short Term Goal: Pt will perform LE dressing with SBA.   Long Term Goal: Pt will perform LE dressing with Mod I.    Toileting Status:   Short Term Goal: Pt will perform toilet hygiene on BSC with Mod I.  Long Term Goal: Pt will perform toilet hygiene on toilet with no AE with Mod I.    Commode Transfer:   Short Term Goal: Pt will perform BSC t/f with Mod I.  Long Term Goal:  Pt will perform toilet t/f in bathroom with Mod I.     Bathing Status:   Long Term Goal: Pt will perform sponge bath with Mod I with no unsafe fatigue.     Strength Status: 5/5 BUEs  Long Term  Goal: Pt to perform BUE strengthening with weights and/or body weight to increase ADL independence and safety    Endurance Status:   Short Term Goal:pt to perform 15 min OT treatment with 5 or greater rest breaks  Long Term Goal: pt to perform 30 min OT treat with 3 or less rest breaks                          Time Tracking:     OT Date of Treatment: 01/24/24  OT Start Time: 1455  OT Stop Time: 1520  OT Total Time (min): 25 min    Billable Minutes:Evaluation Moderate complexity    Lacy Young, OTR/L, CSRS  OT/SOFYA: OT          1/24/2024

## 2024-01-24 NOTE — PLAN OF CARE
GOALS      Short-term Goals: 2 weeks  1. Patient will perform supine to/from sit with supervision to improve independence and safety with bed mobility.  2. Patient will perform sit to/from stand with a rolling walker with contact guard assist to improve independence and safety with transfers.  3. Patient will ambulate 50 feet with a rolling walker with contact guard assist to improve independence and safety with gait.  4. Patient will tolerate 15 minutes of physical therapy intervention to improve endurance and activity tolerance.    Long-term goals: 3 weeks  1. Patient will perform supine to/from sit with modified independence to improve independence and safety with bed mobility.  2. Patient will perform sit to/from stand with a rolling walker with standby assist to improve independence and safety with transfers.  3. Patient will ambulate 150 feet with a rolling walker with standby assist to improve independence and safety with gait.  4. Patient will tolerate 30 minutes of physical therapy intervention to improve endurance and activity tolerance.

## 2024-01-24 NOTE — NURSING
Pt left enroute to [facility] per EMS, all lines dc, all personal belongings taken with pt, NADN, o2 @2L per nasal cannula

## 2024-01-24 NOTE — NURSING
Pt arrived to room via stretcher AAOx3. No c/o pain or discomfort. Resp even and unlabored. No acute distress noted. O2@4L NC. Assisted to bed, oriented pt to room, bed, and call light. Denies any questions or concerns. Call light in reach. Instructed to call for assistance. Verbalized understanding. Bed in lowest position. Side rails up x3.

## 2024-01-24 NOTE — ASSESSMENT & PLAN NOTE
Patient with Paroxysmal (<7 days) atrial fibrillation which is controlled currently with Beta Blocker and Amiodarone. Patient is currently in sinus rhythm.[id number] Score: 3. Anticoagulation indicated. Anticoagulation done with eliquis  .

## 2024-01-24 NOTE — H&P
[facility] - Medical Surgical Unit  Hospital Medicine  History & Physical    Patient Name: Windy Swann  MRN: [id number]  Patient Class: IP- Swing  Admission Date: 1/23/2024  Attending Physician: Ploi Hines IV, DO   Primary Care Provider: [facility]         Patient information was obtained from patient, past medical records, and ER records.     Subjective:     Principal Problem:Generalized weakness    Chief Complaint:   Chief Complaint   Patient presents with    Weakness        HPI: 89 year old female with an extensive PMH presents to the hospital with shortness of breath and chest pain.  She was found to be in atrial fibrillation on admission and placed on an Amiodarone drip. She was placed in the ICU on a NTG drip for her HTN.  She was also placed on Bipap.  She was diuresed.  The patient told Cardiology she did not want invasive/extensive testing for her medical conditions.   She has new onset afib, systolic CHF, and severe MR.  She has been optimized medically.. Would benefit from swing bed services to maximize her function.  She lives alone in [place]. She is being transferred to [place] for swing. At the end of that stay would likely benefit from transition to home hospice. Other option would be home health.          Above info is from MD summary [facility] Stay 01/14/24-01/24-01/23/24.  Mrs. Swann was admitted to swing bed here at [facility] around 7 pm last night. On my assessment this morning. She is awake and alert, very talkative. Denies pain and SOB. Has o2 per nc at 2 liters. States she lives alone, uses a walker for ambulation. States she plans to dc home alone.      Past Medical History:   Diagnosis Date    Bacteriuria 01/20/2024       No past surgical history on file.    Review of patient's allergies indicates:   Allergen Reactions    Aspirin      Patient denies any rash or airway issue with aspirin       Current Facility-Administered Medications on File Prior  to Encounter   Medication    albuterol-ipratropium 2.5 mg-0.5 mg/3 mL nebulizer solution 3 mL    [START ON 1/24/2024] amiodarone tablet 200 mg    amiodarone tablet 400 mg    apixaban tablet 5 mg    aspirin EC tablet 81 mg    atorvastatin tablet 20 mg    dextrose 10% bolus 125 mL 125 mL    dextrose 10% bolus 250 mL 250 mL    docusate sodium capsule 100 mg    furosemide tablet 40 mg    glucagon (human recombinant) injection 1 mg    insulin aspart U-100 injection 0-5 Units    levothyroxine tablet 25 mcg    losartan tablet 50 mg    metoprolol succinate (TOPROL-XL) 24 hr tablet 100 mg    ondansetron injection 4 mg    polyethylene glycol packet 17 g    sodium chloride 0.9% flush 10 mL    trazodone split tablet 25 mg     Current Outpatient Medications on File Prior to Encounter   Medication Sig    albuterol-ipratropium (DUO-NEB) 2.5 mg-0.5 mg/3 mL nebulizer solution Take 3 mLs by nebulization every 6 (six) hours as needed for Wheezing. Rescue    [START ON 1/24/2024] amiodarone (PACERONE) 200 MG Tab Take 1 tablet (200 mg total) by mouth once daily.    amiodarone (PACERONE) 400 MG tablet Take 1 tablet (400 mg total) by mouth 2 (two) times daily.    apixaban (ELIQUIS) 5 mg Tab Take 1 tablet (5 mg total) by mouth 2 (two) times daily.    [START ON 1/24/2024] aspirin (ECOTRIN) 81 MG EC tablet Take 1 tablet (81 mg total) by mouth once daily.    atorvastatin (LIPITOR) 20 MG tablet Take 1 tablet (20 mg total) by mouth every evening.    docusate sodium (COLACE) 100 MG capsule Take 1 capsule (100 mg total) by mouth daily as needed for Constipation.    [START ON 1/24/2024] furosemide (LASIX) 40 MG tablet Take 1 tablet (40 mg total) by mouth once daily.    [START ON 1/24/2024] levothyroxine (SYNTHROID) 25 MCG tablet Take 1 tablet (25 mcg total) by mouth before breakfast.    [START ON 1/24/2024] losartan (COZAAR) 50 MG tablet Take 1 tablet (50 mg total) by mouth once daily.    [START ON 1/24/2024] metoprolol succinate (TOPROL-XL) 100 MG  24 hr tablet Take 1 tablet (100 mg total) by mouth once daily.    traZODone (DESYREL) 50 MG tablet Take 0.5 tablets (25 mg total) by mouth nightly as needed for Insomnia.    [DISCONTINUED] mv-min/iron/folic/calcium/vitK (WOMEN'S MULTIVITAMIN ORAL) Take 1 tablet by mouth once daily.    [DISCONTINUED] naproxen sodium (ALEVE) 220 MG tablet Take 220 mg by mouth 2 (two) times daily with meals.     Family History    None       Tobacco Use    Smoking status: Not on file    Smokeless tobacco: Not on file   Substance and Sexual Activity    Alcohol use: Not on file    Drug use: Not on file    Sexual activity: Not on file     Review of Systems   Constitutional:  Negative for appetite change and fatigue.   Respiratory:  Negative for cough and shortness of breath.    Cardiovascular:  Negative for chest pain, palpitations and leg swelling.   Gastrointestinal:  Negative for abdominal pain, constipation, diarrhea, nausea and vomiting.   Genitourinary:  Negative for difficulty urinating.   Musculoskeletal:  Positive for arthralgias.        States all joints are affected - but states she is not currently in pain   Skin:  Negative for color change and wound.   Neurological:  Positive for weakness. Negative for light-headedness and headaches.     Objective:     Vital Signs (Most Recent):    Vital Signs (24h Range):  Temp:  [97.4 °F (36.3 °C)-98.5 °F (36.9 °C)] 97.6 °F (36.4 °C)  Pulse:  [] 69  Resp:  [18-20] 18  SpO2:  [95 %-97 %] 97 %  BP: (101-138)/(62-78) 101/68        There is no height or weight on file to calculate BMI.     Physical Exam  HENT:      Head: Normocephalic.      Mouth/Throat:      Mouth: Mucous membranes are moist.   Cardiovascular:      Rate and Rhythm: Normal rate and regular rhythm.      Heart sounds: Murmur heard.   Pulmonary:      Effort: Pulmonary effort is normal.      Breath sounds: Normal breath sounds.      Comments: O2 per nc at 2 liters  Abdominal:      General: Bowel sounds are normal. There is no  distension.      Palpations: Abdomen is soft. There is no mass.      Tenderness: There is no abdominal tenderness. There is no guarding or rebound.      Hernia: No hernia is present.   Musculoskeletal:         General: Normal range of motion.      Cervical back: Normal range of motion.   Skin:     General: Skin is warm and dry.      Coloration: Skin is not jaundiced or pale.      Findings: No bruising or erythema.   Neurological:      Mental Status: She is alert and oriented to person, place, and time.      Motor: Weakness present.   Psychiatric:         Mood and Affect: Mood normal.                Significant Labs: All pertinent labs within the past 24 hours have been reviewed.  Recent Lab Results         01/23/24  1630   01/23/24  1106        POC Glucose 110   139               Significant Imaging: I have reviewed all pertinent imaging results/findings within the past 24 hours.  Assessment/Plan:     * Generalized weakness  01/24/24 PT and OT to evaluate and treat       Atrial fibrillation with rapid ventricular response  Patient with Paroxysmal (<7 days) atrial fibrillation which is controlled currently with Beta Blocker and Amiodarone. Patient is currently in sinus rhythm.[id number] Score: 3. Anticoagulation indicated. Anticoagulation done with eliquis  .    Systolic congestive heart failure  Patient is identified as having Systolic (HFrEF) heart failure that is Chronic. CHF is currently controlled. Latest ECHO performed and demonstrates- Results for orders placed during the hospital encounter of 01/14/24    Echo    Interpretation Summary    Left Ventricle: The left ventricle is normal in size. Normal wall thickness. There is eccentric hypertrophy. Regional wall motion abnormalities present. Anterior and amy-septal wall appears akinetic There is severely reduced systolic function with a visually estimated ejection fraction of 20 - 25%. Biplane (2D) method of discs ejection fraction is 24%. Unable to assess  "diastolic function due to atrial fibrillation.    Right Ventricle: Normal right ventricular cavity size. Systolic function is normal.    Left Atrium: Left atrium is severely dilated.    Right Atrium: Right atrium is severely dilated.    Aortic Valve: The aortic valve is a trileaflet valve.    Mitral Valve: There is posterior leaflet sclerosis. There is annular dilation present. There is severe regurgitation.    Tricuspid Valve: There is severe regurgitation.    Pulmonic Valve: There is mild regurgitation.    Pulmonary Artery: The estimated pulmonary artery systolic pressure is 51 mmHg.    IVC/SVC: Elevated venous pressure at 15 mmHg.    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 Continue Beta Blocker, ACE/ARB, and Furosemide and monitor clinical status closely. Monitor on telemetry. Patient is on CHF pathway.  Monitor strict Is&Os and daily weights. Continue to stress to patient importance of self efficacy and  on diet for CHF. Last BNP reviewed- and noted below No results for input(s): "BNP", "BNPTRIAGEBLO" in the last 168 hours.    Severe mitral regurgitation  Has refused invasive testing, followed by cardiology. Continue current meds. Hospice appropriate.      Hypothyroidism  01/24/24 continue levothyroxine 25 mcg po daily        VTE Risk Mitigation (From admission, onward)           Ordered     apixaban tablet 5 mg  2 times daily         01/23/24 2127                                    Poli Hines IV, DO  Department of Hospital Medicine  [facility] - Medical Surgical Unit          "

## 2024-01-25 LAB
ANION GAP SERPL CALCULATED.3IONS-SCNC: 7 MMOL/L (ref 7–16)
BASOPHILS # BLD AUTO: 0.09 K/UL (ref 0–0.2)
BASOPHILS NFR BLD AUTO: 1 % (ref 0–1)
BUN SERPL-MCNC: 20 MG/DL (ref 7–18)
BUN/CREAT SERPL: 23 (ref 6–20)
CALCIUM SERPL-MCNC: 9.3 MG/DL (ref 8.5–10.1)
CHLORIDE SERPL-SCNC: 100 MMOL/L (ref 98–107)
CO2 SERPL-SCNC: 33 MMOL/L (ref 21–32)
CREAT SERPL-MCNC: 0.88 MG/DL (ref 0.55–1.02)
DIFFERENTIAL METHOD BLD: ABNORMAL
EGFR (NO RACE VARIABLE) (RUSH/TITUS): 63 ML/MIN/1.73M2
EOSINOPHIL # BLD AUTO: 0.19 K/UL (ref 0–0.5)
EOSINOPHIL NFR BLD AUTO: 2.1 % (ref 1–4)
ERYTHROCYTE [DISTWIDTH] IN BLOOD BY AUTOMATED COUNT: 13 % (ref 11.5–14.5)
GLUCOSE SERPL-MCNC: 111 MG/DL (ref 74–106)
HCT VFR BLD AUTO: 43.8 % (ref 38–47)
HGB BLD-MCNC: 13.6 G/DL (ref 12–16)
LYMPHOCYTES # BLD AUTO: 1.48 K/UL (ref 1–4.8)
LYMPHOCYTES NFR BLD AUTO: 16.7 % (ref 27–41)
MAGNESIUM SERPL-MCNC: 2.5 MG/DL (ref 1.7–2.3)
MCH RBC QN AUTO: 30.6 PG (ref 27–31)
MCHC RBC AUTO-ENTMCNC: 31.1 G/DL (ref 32–36)
MCV RBC AUTO: 98.4 FL (ref 80–96)
MONOCYTES # BLD AUTO: 0.59 K/UL (ref 0–0.8)
MONOCYTES NFR BLD AUTO: 6.7 % (ref 2–6)
MPC BLD CALC-MCNC: 10 FL (ref 9.4–12.4)
NEUTROPHILS # BLD AUTO: 6.49 K/UL (ref 1.8–7.7)
NEUTROPHILS NFR BLD AUTO: 73.5 % (ref 53–65)
PHOSPHATE SERPL-MCNC: 3.7 MG/DL (ref 2.5–4.5)
PLATELET # BLD AUTO: 301 K/UL (ref 150–400)
POTASSIUM SERPL-SCNC: 4.5 MMOL/L (ref 3.5–5.1)
RBC # BLD AUTO: 4.45 M/UL (ref 4.2–5.4)
SODIUM SERPL-SCNC: 135 MMOL/L (ref 136–145)
WBC # BLD AUTO: 8.84 K/UL (ref 4.5–11)

## 2024-01-25 PROCEDURE — 99900035 HC TECH TIME PER 15 MIN (STAT)

## 2024-01-25 PROCEDURE — 97530 THERAPEUTIC ACTIVITIES: CPT | Mod: CQ

## 2024-01-25 PROCEDURE — 97116 GAIT TRAINING THERAPY: CPT | Mod: CQ

## 2024-01-25 PROCEDURE — 27000982 HC MATTRESS, MATRIX LAL RENTAL

## 2024-01-25 PROCEDURE — 11000004 HC SNF PRIVATE

## 2024-01-25 PROCEDURE — 27000944

## 2024-01-25 PROCEDURE — 97535 SELF CARE MNGMENT TRAINING: CPT

## 2024-01-25 PROCEDURE — 85025 COMPLETE CBC W/AUTO DIFF WBC: CPT | Performed by: NURSE PRACTITIONER

## 2024-01-25 PROCEDURE — 94761 N-INVAS EAR/PLS OXIMETRY MLT: CPT

## 2024-01-25 PROCEDURE — 97110 THERAPEUTIC EXERCISES: CPT

## 2024-01-25 PROCEDURE — 97110 THERAPEUTIC EXERCISES: CPT | Mod: CQ

## 2024-01-25 PROCEDURE — 25000003 PHARM REV CODE 250: Performed by: NURSE PRACTITIONER

## 2024-01-25 PROCEDURE — 27000221 HC OXYGEN, UP TO 24 HOURS

## 2024-01-25 PROCEDURE — 83735 ASSAY OF MAGNESIUM: CPT | Performed by: NURSE PRACTITIONER

## 2024-01-25 PROCEDURE — 84100 ASSAY OF PHOSPHORUS: CPT | Performed by: NURSE PRACTITIONER

## 2024-01-25 PROCEDURE — 97530 THERAPEUTIC ACTIVITIES: CPT

## 2024-01-25 PROCEDURE — 80048 BASIC METABOLIC PNL TOTAL CA: CPT | Performed by: NURSE PRACTITIONER

## 2024-01-25 PROCEDURE — 99307 SBSQ NF CARE SF MDM 10: CPT | Mod: ,,, | Performed by: NURSE PRACTITIONER

## 2024-01-25 RX ADMIN — SENNOSIDES AND DOCUSATE SODIUM 1 TABLET: 8.6; 5 TABLET ORAL at 08:01

## 2024-01-25 RX ADMIN — METOPROLOL SUCCINATE 100 MG: 50 TABLET, FILM COATED, EXTENDED RELEASE ORAL at 08:01

## 2024-01-25 RX ADMIN — ATORVASTATIN CALCIUM 20 MG: 10 TABLET, FILM COATED ORAL at 08:01

## 2024-01-25 RX ADMIN — LEVOTHYROXINE SODIUM 25 MCG: 0.03 TABLET ORAL at 06:01

## 2024-01-25 RX ADMIN — LOSARTAN POTASSIUM 50 MG: 50 TABLET, FILM COATED ORAL at 08:01

## 2024-01-25 RX ADMIN — APIXABAN 5 MG: 2.5 TABLET, FILM COATED ORAL at 08:01

## 2024-01-25 RX ADMIN — AMIODARONE HYDROCHLORIDE 200 MG: 200 TABLET ORAL at 08:01

## 2024-01-25 RX ADMIN — ASPIRIN 81 MG: 81 TABLET, COATED ORAL at 08:01

## 2024-01-25 RX ADMIN — FUROSEMIDE 40 MG: 40 TABLET ORAL at 08:01

## 2024-01-25 NOTE — PROGRESS NOTES
[facility] - Medical Surgical Unit  Hospital Medicine  Progress Note    Patient Name: Windy Swann  MRN: [id number]  Patient Class: IP- Swing   Admission Date: 1/23/2024  Length of Stay: 2 days  Attending Physician: Poli Hines IV, DO  Primary Care Provider: Alyssa, Primary Doctor        Subjective:     Principal Problem:Generalized weakness        HPI:  89 year old female with an extensive PMH presents to the hospital with shortness of breath and chest pain.  She was found to be in atrial fibrillation on admission and placed on an Amiodarone drip. She was placed in the ICU on a NTG drip for her HTN.  She was also placed on Bipap.  She was diuresed.  The patient told Cardiology she did not want invasive/extensive testing for her medical conditions.   She has new onset afib, systolic CHF, and severe MR.  She has been optimized medically.. Would benefit from swing bed services to maximize her function.  She lives alone in [place]. She is being transferred to [place] for swing. At the end of that stay would likely benefit from transition to home hospice. Other option would be home health.          Above info is from NM summary [facility] Stay 01/14/24-01/24-01/23/24.  Mrs. Swann was admitted to swing bed here at [facility] around 7 pm last night. On my assessment this morning. She is awake and alert, very talkative. Denies pain and SOB. Has o2 per nc at 2 liters. States she lives alone, uses a walker for ambulation. States she plans to dc home alone.      Overview/Hospital Course:  01/25/24 Awake and resting in bed. Complains of having nasal congestion earlier this morning. O2 sat on 2 liters is 96%. Continue therapy for strengthening.    Interval History: weakness    Review of Systems   Constitutional:  Negative for appetite change and fatigue.   Respiratory:  Negative for cough and shortness of breath.    Cardiovascular:  Negative for chest pain, palpitations and leg swelling.    Gastrointestinal:  Negative for abdominal pain, constipation, diarrhea, nausea and vomiting.   Genitourinary:  Negative for difficulty urinating.   Musculoskeletal:  Positive for arthralgias.        States all joints are affected - but states she is not currently in pain   Skin:  Negative for color change and wound.   Neurological:  Positive for weakness. Negative for light-headedness and headaches.     Objective:     Vital Signs (Most Recent):  Temp: 98 °F (36.7 °C) (01/25/24 0721)  Pulse: 95 (01/25/24 0721)  Resp: 18 (01/25/24 0721)  BP: 116/81 (01/25/24 0721)  SpO2: 96 % (01/25/24 0721) Vital Signs (24h Range):  Temp:  [97.4 °F (36.3 °C)-98 °F (36.7 °C)] 98 °F (36.7 °C)  Pulse:  [] 95  Resp:  [18-20] 18  SpO2:  [95 %-96 %] 96 %  BP: (112-116)/(75-81) 116/81     Weight: 68.5 kg (151 lb)  Body mass index is 24.37 kg/m².    Intake/Output Summary (Last 24 hours) at 1/25/2024 0936  Last data filed at 1/25/2024 0853  Gross per 24 hour   Intake 840 ml   Output 200 ml   Net 640 ml         Physical Exam  HENT:      Head: Normocephalic.      Mouth/Throat:      Mouth: Mucous membranes are moist.   Cardiovascular:      Rate and Rhythm: Normal rate and regular rhythm.      Heart sounds: Murmur heard.   Pulmonary:      Effort: Pulmonary effort is normal.      Breath sounds: Normal breath sounds.      Comments: O2 per nc at 2 liters  Abdominal:      General: Bowel sounds are normal. There is no distension.      Palpations: Abdomen is soft. There is no mass.      Tenderness: There is no abdominal tenderness. There is no guarding or rebound.      Hernia: No hernia is present.   Musculoskeletal:         General: Normal range of motion.      Cervical back: Normal range of motion.   Skin:     General: Skin is warm and dry.      Coloration: Skin is not jaundiced or pale.      Findings: No bruising or erythema.   Neurological:      Mental Status: She is alert and oriented to person, place, and time.      Motor: Weakness  present.   Psychiatric:         Mood and Affect: Mood normal.             Significant Labs: All pertinent labs within the past 24 hours have been reviewed.  Recent Lab Results         01/25/24  0420   01/24/24  1739        Anion Gap 7         Appearance, UA   Clear       Baso # 0.09         Basophil % 1.0         Bilirubin (UA)   Negative       BUN 20         BUN/CREAT RATIO 23         Calcium 9.3         Chloride 100         CO2 33         Color, UA   Yellow       Creatinine 0.88         Differential Method Auto         eGFR 63         Eos # 0.19         Eosinophil % 2.1         Glucose 111         Glucose, UA   Negative       Hematocrit 43.8         Hemoglobin 13.6         Ketones, UA   Negative       Leukocytes, UA   Negative       Lymph # 1.48         Lymph % 16.7         Magnesium  2.5         MCH 30.6         MCHC 31.1         MCV 98.4         Mono # 0.59         Mono % 6.7         MPV 10.0         Neutrophils, Abs 6.49         Neutrophils Relative 73.5         NITRITE UA   Negative       Occult Blood UA   Moderate       pH, UA   5.5       Phosphorus Level 3.7         Platelet Count 301         Potassium 4.5         Protein, UA   Negative       RBC 4.45         RBC, UA   3-5       RDW 13.0         Sodium 135         Specific [place], UA   1.025       Squam Epithel, UA   Few       UROBILINOGEN UA   0.2       WBC, UA   0-5       WBC 8.84                 Significant Imaging: I have reviewed all pertinent imaging results/findings within the past 24 hours.    Assessment/Plan:      * Generalized weakness  01/24/24 PT and OT to evaluate and treat       01/25/24 walked 12 feet with therapist yesterday    Atrial fibrillation with rapid ventricular response  Patient with Paroxysmal (<7 days) atrial fibrillation which is controlled currently with Beta Blocker and Amiodarone. Patient is currently in sinus rhythm.[id number] Score: 3. Anticoagulation indicated. Anticoagulation done with eliquis  .    Systolic congestive  "heart failure  Patient is identified as having Systolic (HFrEF) heart failure that is Chronic. CHF is currently controlled. Latest ECHO performed and demonstrates- Results for orders placed during the hospital encounter of 01/14/24    Echo    Interpretation Summary    Left Ventricle: The left ventricle is normal in size. Normal wall thickness. There is eccentric hypertrophy. Regional wall motion abnormalities present. Anterior and amy-septal wall appears akinetic There is severely reduced systolic function with a visually estimated ejection fraction of 20 - 25%. Biplane (2D) method of discs ejection fraction is 24%. Unable to assess diastolic function due to atrial fibrillation.    Right Ventricle: Normal right ventricular cavity size. Systolic function is normal.    Left Atrium: Left atrium is severely dilated.    Right Atrium: Right atrium is severely dilated.    Aortic Valve: The aortic valve is a trileaflet valve.    Mitral Valve: There is posterior leaflet sclerosis. There is annular dilation present. There is severe regurgitation.    Tricuspid Valve: There is severe regurgitation.    Pulmonic Valve: There is mild regurgitation.    Pulmonary Artery: The estimated pulmonary artery systolic pressure is 51 mmHg.    IVC/SVC: Elevated venous pressure at 15 mmHg.    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 Continue Beta Blocker, ACE/ARB, and Furosemide and monitor clinical status closely. Monitor on telemetry. Patient is on CHF pathway.  Monitor strict Is&Os and daily weights. Continue to stress to patient importance of self efficacy and  on diet for CHF. Last BNP reviewed- and noted below No results for input(s): "BNP", "BNPTRIAGEBLO" in the last 168 hours.    Severe mitral regurgitation  Has refused invasive testing, followed by cardiology. Continue current meds. Hospice appropriate.      Hypothyroidism  01/24/24 continue " levothyroxine 25 mcg po daily        VTE Risk Mitigation (From admission, onward)           Ordered     apixaban tablet 5 mg  2 times daily         01/23/24 2127                    Discharge Planning   SELWYN:      Code Status: DNR   Is the patient medically ready for discharge?:     Reason for patient still in hospital (select all that apply): Treatment  Discharge Plan A: (P) Home                  ALVARO Roca  Department of Hospital Medicine   [facility] - Medical Surgical Unit

## 2024-01-25 NOTE — NURSING
Left message to call back.  Letter sent to patient with results. Understanding Your Cholesterol and Your Low Fat, Cholesterol Lowering Meal plan For Your Well Being mailed to her.   Summons to room per Ela PERLA,  stating that she was assisting pt to bathroom with walker in tow, patient appeared to tumble after her walker hit the door framing of the bathroom wall, patient fell against the wall inside the bathroom, patient was unable to maintain her balance when asked to stand up straight. Patient was then assisted down to the floor.  DARLENE Torre and NANETTE Bennett found pt sitting on the floor next to toilet. Patient voiced no c/o pain or discomfort. PT assisted up to toilet. DORA Clemens notified of incident. Vital signs taken.

## 2024-01-25 NOTE — HOSPITAL COURSE
01/25/24 Awake and resting in bed. Complains of having nasal congestion earlier this morning. O2 sat on 2 liters is 96%. Continue therapy for strengthening.  01/25/24 notified that pt had assisted fall while in bathroom. PTA states she assisted her to floor . Pt.blames fall on nonskid socks. Mrs. Swann denies injuring herself. Did not hit head. No apparent injury noted.  01/26/24 Awake and sitting up in reclining chair. Complains of having anxiety if her door to her room is shut. Note is on door asking for it to be left open. Denies pain this morning. Sat is 97%.  01/29/24 Awake and resting in bed without complaints. Today is her 89st birthday . Wished her a happy birthday.  Continue therapy for strengthening.  01/30/24 Awake and resting in bed. No complaints. She walked a total of 90 ft with therapist yesterday. Talked with her re plans at dc - states she knows she will have to dc to nursing home if she is unable to dc home.  02/02/24 Mrs. Swann has been accepted to [facility]. She will discharge there today. 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 Medications reconciled.

## 2024-01-25 NOTE — PT/OT/SLP PROGRESS
Physical Therapy Treatment    Patient Name:  Windy Swann   MRN:  [id number]    Recommendations:     Discharge Recommendations: Moderate Intensity Therapy  Discharge Equipment Recommendations: 3-in-1 commode  Barriers to discharge: Decreased caregiver support    Assessment:     Windy Swann is a 89 y.o. female admitted with a medical diagnosis of Generalized weakness.  She presents with the following impairments/functional limitations: weakness, impaired endurance, impaired self care skills, impaired functional mobility, gait instability, impaired balance, decreased safety awareness, decreased lower extremity function Patient was agreeable to perform therapy today. Patient performed her entire therapy session with no complaints or issues. Once patient was returned back to room she asked to be assisted to the restroom. Therapist was assisting patient to restroom where she hit the door frame with her walker which in turn caused her to lose her balance. Patient then leaned into wall where she was unable to come back to standing. Therapist slowly assisted patient to floor. Patient had no hard hits to any part of her body and reported to be feeling good with no complaints of pain to follow. Patient's nurses as well as the NP were all notified of the incident. Patient was also assessed by nurse Orta with all good reports to follow as well as all vital signs in normal range. Patient was left with HOB elevated with her lunch tray and visitor present. Patient with no complaints and reported having no pain to follow.    Rehab Prognosis: Good; patient would benefit from acute skilled PT services to address these deficits and reach maximum level of function.    Recent Surgery: * No surgery found *      Plan:     During this hospitalization, patient to be seen 5 x/week to address the identified rehab impairments via gait training, therapeutic activities, therapeutic exercises, neuromuscular re-education and progress toward  "the following goals:    Plan of Care Expires:  02/14/24    Subjective     Chief Complaint: weakness  Patient/Family Comments/goals: return home  Pain/Comfort:  Pain Rating 1: 0/10  Pain Rating Post-Intervention 1: 0/10      Objective:     Communicated with OT prior to session.  Patient found up in chair with oxygen upon PT entry to room.     General Precautions: Standard, fall  Orthopedic Precautions: N/A  Braces: N/A  Respiratory Status: Nasal cannula, flow 2 L/min     Functional Mobility:  Bed Mobility:     Sit to Supine: minimum assistance  Transfers:     Toilet Transfer: minimum assistance with  rolling walker  using  step transfer, patient did lose balance while transferring to toilet which required her to be assisted to the floor, patient was then assisted off the floor with assistance from nurses where patient was able to use the restroom before requiring min a to transfer back to bed with therapist  Gait: Patient ambulated 10' with RW and min a.      AM-PAC 6 CLICK MOBILITY  Turning over in bed (including adjusting bedclothes, sheets and blankets)?: 3  Sitting down on and standing up from a chair with arms (e.g., wheelchair, bedside commode, etc.): 3  Moving from lying on back to sitting on the side of the bed?: 3  Moving to and from a bed to a chair (including a wheelchair)?: 3  Need to walk in hospital room?: 3  Climbing 3-5 steps with a railing?: 2  Basic Mobility Total Score: 17       Treatment & Education:  Transfers and ambulation as listed above.   Long arc quads - 20 reps   Seated hip flexion - 20 reps   Ankle pumps - 20 reps  Sit to stands - 10 reps   Patient received education on transfers and gait training. Patient did express feeling like she "is too independent" at certain times. Therapist educated patient on importance of slowing down and focusing on transfers in order to ensure her safety. Patient voiced understanding.     Patient left HOB elevated with  lunch tray and visitor " present..    GOALS:   Multidisciplinary Problems       Physical Therapy Goals          Problem: Physical Therapy    Goal Priority Disciplines Outcome Goal Variances Interventions   Physical Therapy Goal     PT, PT/OT                          Time Tracking:     PT Received On: 01/25/24  PT Start Time: 1109     PT Stop Time: 1225  PT Total Time (min): 76 min     Billable Minutes: Gait Training 15, Therapeutic Activity 21, and Therapeutic Exercise 40    Treatment Type: Treatment  PT/PTA: PTA     Number of PTA visits since last PT visit: 1   HARIS Calhoun  01/25/2024

## 2024-01-25 NOTE — PLAN OF CARE
PT AAOX3. Resp even. O2 at 2 liter per NC. Bruising noted to Brian arms and lower legs. Adult brief intact for occasional accidents. PT assisted up to bathroom X 1 assist.. Foam dressing noted to sacral and brian heels. Bed low. SR up X 3. Chair and bed alarm noted. CB within reach.   Problem: Adult Inpatient Plan of Care  Goal: Plan of Care Review  Outcome: Ongoing, Progressing  Goal: Patient-Specific Goal (Individualized)  Outcome: Ongoing, Progressing  Goal: Absence of Hospital-Acquired Illness or Injury  Outcome: Ongoing, Progressing  Goal: Optimal Comfort and Wellbeing  Outcome: Ongoing, Progressing  Goal: Readiness for Transition of Care  Outcome: Ongoing, Progressing     Problem: Fall Injury Risk  Goal: Absence of Fall and Fall-Related Injury  Outcome: Ongoing, Progressing

## 2024-01-25 NOTE — PT/OT/SLP PROGRESS
Occupational Therapy   Treatment    Name: Windy Swann  MRN: [id number]  Admitting Diagnosis:  Generalized weakness       Recommendations:     Discharge Recommendations: Moderate Intensity Therapy  Discharge Equipment Recommendations:  3-in-1 commode  Barriers to discharge:  None    Assessment:     Windy Swann is a 89 y.o. female with a medical diagnosis of Generalized weakness.  She presents with weakness and decline with ADLs. Performance deficits affecting function are weakness, impaired endurance, impaired self care skills, impaired functional mobility, gait instability, impaired balance, decreased safety awareness, decreased lower extremity function, decreased upper extremity function.     Rehab Prognosis:  Fair; patient would benefit from acute skilled OT services to address these deficits and reach maximum level of function.       Plan:     Patient to be seen 5 x/week to address the above listed problems via self-care/home management, therapeutic activities, therapeutic exercises  Plan of Care Expires: 02/07/24  Plan of Care Reviewed with: patient    Subjective     Chief Complaint: Pain and weakness  Patient/Family Comments/goals:  to return to prior level of function  Pain/Comfort:       Objective:     Communicated with: Nurse prior to session.  Patient found supine with bed alarm, oxygen upon OT entry to room.    General Precautions: Standard, fall    Orthopedic Precautions:N/A  Braces: N/A  Respiratory Status: Nasal cannula     Occupational Performance:     Bed Mobility:    Patient completed Rolling/Turning to Left with  supervision  Patient completed Rolling/Turning to Right with supervision  Patient completed Scooting/Bridging with supervision  Patient completed Supine to Sit with supervision  Patient completed Sit to Supine with supervision     Functional Mobility/Transfers:  Patient completed Sit <> Stand Transfer with minimum assistance  with  rolling walker   Patient completed Bed <> Chair  Transfer using Step Transfer technique with minimum assistance with rolling walker  Functional Mobility: Patient transferred within room with Min A with minimal loss of balance in bathroom but able to correct herself with assistance from therapist.    Activities of Daily Living:  Grooming: contact guard assistance to stand at sink and wash hands  Upper Body Dressing: minimum assistance to ame hospital gown as a robe  Lower Body Dressing: minimum assistance to ame underwear  Toileting: stand by assistance to perform toileting and hygiene      [facility] 6 Click ADL:      Treatment & Education:  Pt performed B UE strengthening exercises to include:   Shoulder flexion   Chest press    Elbow flexion   Elbow extension   Pectoral stretches   Bilateral rowing  All exercises performed 3x10 reps using 2# dowel and red theraband.    Patient left up in chair with all lines intact, call button in reach, and PTA present to complete physical therapy treatment.     GOALS:   Multidisciplinary Problems       Occupational Therapy Goals          Problem: Occupational Therapy    Goal Priority Disciplines Outcome Interventions   Occupational Therapy Goal     OT, PT/OT Ongoing, Progressing    Description: Grooming Status:   Short Term Goal: Pt will perform grooming with SBA sitting EOB.   Long Term Goal: Pt will perform grooming/oral hygiene standing at sink with SBA      LE dressing Status:   Short Term Goal: Pt will perform LE dressing with SBA.   Long Term Goal: Pt will perform LE dressing with Mod I.    Toileting Status:   Short Term Goal: Pt will perform toilet hygiene on BSC with Mod I.  Long Term Goal: Pt will perform toilet hygiene on toilet with no AE with Mod I.    Commode Transfer:   Short Term Goal: Pt will perform BSC t/f with Mod I.  Long Term Goal:  Pt will perform toilet t/f in bathroom with Mod I.     Bathing Status:   Long Term Goal: Pt will perform sponge bath with Mod I with no unsafe fatigue.     Strength Status: 5/5  BUEs  Long Term Goal: Pt to perform BUE strengthening with weights and/or body weight to increase ADL independence and safety    Endurance Status:   Short Term Goal:pt to perform 15 min OT treatment with 5 or greater rest breaks  Long Term Goal: pt to perform 30 min OT treat with 3 or less rest breaks                          Time Tracking:     OT Date of Treatment: 01/25/24  OT Start Time: 1005  OT Stop Time: 1109  OT Total Time (min): 64 min    Billable Minutes:Self Care/Home Management 20 min  Therapeutic Activity 14 min  Therapeutic Exercise 30 min    GILBERT Tatum/L, CSRS  OT/SOFYA: OT          1/25/2024

## 2024-01-25 NOTE — SUBJECTIVE & OBJECTIVE
Interval History: weakness    Review of Systems   Constitutional:  Negative for appetite change and fatigue.   Respiratory:  Negative for cough and shortness of breath.    Cardiovascular:  Negative for chest pain, palpitations and leg swelling.   Gastrointestinal:  Negative for abdominal pain, constipation, diarrhea, nausea and vomiting.   Genitourinary:  Negative for difficulty urinating.   Musculoskeletal:  Positive for arthralgias.        States all joints are affected - but states she is not currently in pain   Skin:  Negative for color change and wound.   Neurological:  Positive for weakness. Negative for light-headedness and headaches.     Objective:     Vital Signs (Most Recent):  Temp: 98 °F (36.7 °C) (01/25/24 0721)  Pulse: 95 (01/25/24 0721)  Resp: 18 (01/25/24 0721)  BP: 116/81 (01/25/24 0721)  SpO2: 96 % (01/25/24 0721) Vital Signs (24h Range):  Temp:  [97.4 °F (36.3 °C)-98 °F (36.7 °C)] 98 °F (36.7 °C)  Pulse:  [] 95  Resp:  [18-20] 18  SpO2:  [95 %-96 %] 96 %  BP: (112-116)/(75-81) 116/81     Weight: 68.5 kg (151 lb)  Body mass index is 24.37 kg/m².    Intake/Output Summary (Last 24 hours) at 1/25/2024 0936  Last data filed at 1/25/2024 0853  Gross per 24 hour   Intake 840 ml   Output 200 ml   Net 640 ml         Physical Exam  HENT:      Head: Normocephalic.      Mouth/Throat:      Mouth: Mucous membranes are moist.   Cardiovascular:      Rate and Rhythm: Normal rate and regular rhythm.      Heart sounds: Murmur heard.   Pulmonary:      Effort: Pulmonary effort is normal.      Breath sounds: Normal breath sounds.      Comments: O2 per nc at 2 liters  Abdominal:      General: Bowel sounds are normal. There is no distension.      Palpations: Abdomen is soft. There is no mass.      Tenderness: There is no abdominal tenderness. There is no guarding or rebound.      Hernia: No hernia is present.   Musculoskeletal:         General: Normal range of motion.      Cervical back: Normal range of motion.    Skin:     General: Skin is warm and dry.      Coloration: Skin is not jaundiced or pale.      Findings: No bruising or erythema.   Neurological:      Mental Status: She is alert and oriented to person, place, and time.      Motor: Weakness present.   Psychiatric:         Mood and Affect: Mood normal.             Significant Labs: All pertinent labs within the past 24 hours have been reviewed.  Recent Lab Results         01/25/24  0420   01/24/24  1739        Anion Gap 7         Appearance, UA   Clear       Baso # 0.09         Basophil % 1.0         Bilirubin (UA)   Negative       BUN 20         BUN/CREAT RATIO 23         Calcium 9.3         Chloride 100         CO2 33         Color, UA   Yellow       Creatinine 0.88         Differential Method Auto         eGFR 63         Eos # 0.19         Eosinophil % 2.1         Glucose 111         Glucose, UA   Negative       Hematocrit 43.8         Hemoglobin 13.6         Ketones, UA   Negative       Leukocytes, UA   Negative       Lymph # 1.48         Lymph % 16.7         Magnesium  2.5         MCH 30.6         MCHC 31.1         MCV 98.4         Mono # 0.59         Mono % 6.7         MPV 10.0         Neutrophils, Abs 6.49         Neutrophils Relative 73.5         NITRITE UA   Negative       Occult Blood UA   Moderate       pH, UA   5.5       Phosphorus Level 3.7         Platelet Count 301         Potassium 4.5         Protein, UA   Negative       RBC 4.45         RBC, UA   3-5       RDW 13.0         Sodium 135         Specific [place], UA   1.025       Squam Epithel, UA   Few       UROBILINOGEN UA   0.2       WBC, UA   0-5       WBC 8.84                 Significant Imaging: I have reviewed all pertinent imaging results/findings within the past 24 hours.

## 2024-01-26 PROCEDURE — 99900035 HC TECH TIME PER 15 MIN (STAT)

## 2024-01-26 PROCEDURE — 27000221 HC OXYGEN, UP TO 24 HOURS

## 2024-01-26 PROCEDURE — 25000003 PHARM REV CODE 250: Performed by: NURSE PRACTITIONER

## 2024-01-26 PROCEDURE — 97530 THERAPEUTIC ACTIVITIES: CPT | Mod: CQ

## 2024-01-26 PROCEDURE — 11000004 HC SNF PRIVATE

## 2024-01-26 PROCEDURE — 25000003 PHARM REV CODE 250: Performed by: FAMILY MEDICINE

## 2024-01-26 PROCEDURE — 97110 THERAPEUTIC EXERCISES: CPT | Mod: CQ

## 2024-01-26 PROCEDURE — 99307 SBSQ NF CARE SF MDM 10: CPT | Mod: ,,, | Performed by: NURSE PRACTITIONER

## 2024-01-26 PROCEDURE — 97535 SELF CARE MNGMENT TRAINING: CPT

## 2024-01-26 PROCEDURE — 27000944

## 2024-01-26 PROCEDURE — 97530 THERAPEUTIC ACTIVITIES: CPT

## 2024-01-26 PROCEDURE — 94761 N-INVAS EAR/PLS OXIMETRY MLT: CPT

## 2024-01-26 PROCEDURE — 27000982 HC MATTRESS, MATRIX LAL RENTAL

## 2024-01-26 RX ORDER — MUPIROCIN 20 MG/G
OINTMENT TOPICAL 2 TIMES DAILY
Status: COMPLETED | OUTPATIENT
Start: 2024-01-26 | End: 2024-01-30

## 2024-01-26 RX ADMIN — SENNOSIDES AND DOCUSATE SODIUM 1 TABLET: 8.6; 5 TABLET ORAL at 08:01

## 2024-01-26 RX ADMIN — ASPIRIN 81 MG: 81 TABLET, COATED ORAL at 08:01

## 2024-01-26 RX ADMIN — FUROSEMIDE 40 MG: 40 TABLET ORAL at 08:01

## 2024-01-26 RX ADMIN — APIXABAN 5 MG: 2.5 TABLET, FILM COATED ORAL at 08:01

## 2024-01-26 RX ADMIN — MUPIROCIN: 20 OINTMENT TOPICAL at 08:01

## 2024-01-26 RX ADMIN — LOSARTAN POTASSIUM 50 MG: 50 TABLET, FILM COATED ORAL at 08:01

## 2024-01-26 RX ADMIN — METOPROLOL SUCCINATE 100 MG: 50 TABLET, FILM COATED, EXTENDED RELEASE ORAL at 08:01

## 2024-01-26 RX ADMIN — ATORVASTATIN CALCIUM 20 MG: 10 TABLET, FILM COATED ORAL at 08:01

## 2024-01-26 RX ADMIN — AMIODARONE HYDROCHLORIDE 200 MG: 200 TABLET ORAL at 08:01

## 2024-01-26 RX ADMIN — LEVOTHYROXINE SODIUM 25 MCG: 0.03 TABLET ORAL at 06:01

## 2024-01-26 RX ADMIN — MUPIROCIN: 20 OINTMENT TOPICAL at 10:01

## 2024-01-26 NOTE — PLAN OF CARE
[facility] - Medical Surgical Unit  Discharge Assessment    Primary Care Provider: No, Primary Doctor     Discharge Assessment (most recent)       BRIEF DISCHARGE ASSESSMENT - 01/26/24 2084          Discharge Planning    Assessment Type Discharge Planning Brief Assessment (P)                    Visited with Ms. Swann and her family briefly.  Her step daughter says she doesn't participate much in her care but she do check on her and agrees she doesn't need to return home alone.  She says she has been encouraging her to go to a facility.  Will continue to monitor therapy progression and discharge plans.

## 2024-01-26 NOTE — PT/OT/SLP PROGRESS
Physical Therapy Treatment    Patient Name:  Windy Swann   MRN:  [id number]    Recommendations:     Discharge Recommendations: Moderate Intensity Therapy  Discharge Equipment Recommendations: 3-in-1 commode  Barriers to discharge: Decreased caregiver support    Assessment:     Windy Swann is a 89 y.o. female admitted with a medical diagnosis of Generalized weakness.  She presents with the following impairments/functional limitations: weakness, impaired endurance, impaired self care skills, impaired functional mobility, gait instability, impaired balance, decreased safety awareness, decreased lower extremity function Patient was unwilling to participate with therapy at first due to having panic attacks in the AM, fatigue, not being able to breath good, and not thinking that therapy is helping. Therapist was able to explain the importance of therapy in order to help motivate patient. Patient was still unwilling until  and family also motivated patient. Patient was finally agreeable and was able to perform all exercises with no difficulty as well as completing 3 sit to stands safely with assistance from therapist.     Rehab Prognosis: Good; patient would benefit from acute skilled PT services to address these deficits and reach maximum level of function.    Recent Surgery: * No surgery found *      Plan:     During this hospitalization, patient to be seen 5 x/week to address the identified rehab impairments via gait training, therapeutic activities, therapeutic exercises, neuromuscular re-education and progress toward the following goals:    Plan of Care Expires:  02/14/24    Subjective     Chief Complaint: weakness  Patient/Family Comments/goals: return home  Pain/Comfort:  Pain Rating 1: 0/10  Pain Rating Post-Intervention 1: 0/10      Objective:     Communicated with OT prior to session.  Patient found up in chair with oxygen upon PT entry to room.     General Precautions: Standard, fall  Orthopedic  Precautions: N/A  Braces: N/A  Respiratory Status: Nasal cannula, flow 2 L/min     Functional Mobility:  Transfers:     Sit to Stand:  minimum assistance with rolling walker  Gait: Patient reported feeling too fatigue to perform ambulation today.      AM-PAC 6 CLICK MOBILITY  Turning over in bed (including adjusting bedclothes, sheets and blankets)?: 3  Sitting down on and standing up from a chair with arms (e.g., wheelchair, bedside commode, etc.): 3  Moving from lying on back to sitting on the side of the bed?: 3  Moving to and from a bed to a chair (including a wheelchair)?: 3  Need to walk in hospital room?: 3  Climbing 3-5 steps with a railing?: 2  Basic Mobility Total Score: 17       Treatment & Education:  Transfers as listed above.   Long arc quads -  15 reps  Seated hip flexion - 15 reps  Ankle pumps - 15 reps   Sit to stands with extended standing time each stand - 3 reps   Patient was educated on the importance of participating with therapy as well as what exercises to perform over the weekend. Patient was also educated on the importance of performing transfers slowly to insure her safety with her dizziness she has with standing.     Patient left up in chair with  family present..    GOALS:   Multidisciplinary Problems       Physical Therapy Goals          Problem: Physical Therapy    Goal Priority Disciplines Outcome Goal Variances Interventions   Physical Therapy Goal     PT, PT/OT                          Time Tracking:     PT Received On: 01/26/24  PT Start Time: 1325     PT Stop Time: 1405  PT Total Time (min): 40 min     Billable Minutes: Therapeutic Activity 25 and Therapeutic Exercise 15    Treatment Type: Treatment  PT/PTA: PTA     Number of PTA visits since last PT visit: 2   HARIS Calhoun  01/26/2024

## 2024-01-26 NOTE — SUBJECTIVE & OBJECTIVE
Interval History: weakness    Review of Systems   Constitutional:  Negative for appetite change and fatigue.   Respiratory:  Negative for cough and shortness of breath.    Cardiovascular:  Negative for chest pain, palpitations and leg swelling.   Gastrointestinal:  Negative for abdominal pain, constipation, diarrhea, nausea and vomiting.   Genitourinary:  Negative for difficulty urinating.   Musculoskeletal:  Positive for arthralgias.        States all joints are affected - but states she is not currently in pain   Skin:  Negative for color change and wound.   Neurological:  Positive for weakness. Negative for light-headedness and headaches.     Objective:     Vital Signs (Most Recent):  Temp: 97.4 °F (36.3 °C) (01/26/24 0704)  Pulse: 97 (01/26/24 0704)  Resp: 18 (01/26/24 0704)  BP: 128/74 (01/26/24 0704)  SpO2: 98 % (01/26/24 0704) Vital Signs (24h Range):  Temp:  [97.4 °F (36.3 °C)-97.6 °F (36.4 °C)] 97.4 °F (36.3 °C)  Pulse:  [81-97] 97  Resp:  [18-20] 18  SpO2:  [94 %-98 %] 98 %  BP: (128-137)/(74-77) 128/74     Weight: 68.5 kg (151 lb)  Body mass index is 24.37 kg/m².    Intake/Output Summary (Last 24 hours) at 1/26/2024 0900  Last data filed at 1/26/2024 0851  Gross per 24 hour   Intake 720 ml   Output --   Net 720 ml         Physical Exam  HENT:      Head: Normocephalic.      Mouth/Throat:      Mouth: Mucous membranes are moist.   Cardiovascular:      Rate and Rhythm: Normal rate and regular rhythm.      Heart sounds: Murmur heard.   Pulmonary:      Effort: Pulmonary effort is normal.      Breath sounds: Normal breath sounds.      Comments: O2 per nc at 2 liters  Abdominal:      General: Bowel sounds are normal. There is no distension.      Palpations: Abdomen is soft. There is no mass.      Tenderness: There is no abdominal tenderness. There is no guarding or rebound.      Hernia: No hernia is present.   Musculoskeletal:         General: Normal range of motion.      Cervical back: Normal range of motion.    Skin:     General: Skin is warm and dry.      Coloration: Skin is not jaundiced or pale.      Findings: No bruising or erythema.   Neurological:      Mental Status: She is alert and oriented to person, place, and time.      Motor: Weakness present.   Psychiatric:         Mood and Affect: Mood normal.             Significant Labs: All pertinent labs within the past 24 hours have been reviewed.  Recent Lab Results       None            Significant Imaging: I have reviewed all pertinent imaging results/findings within the past 24 hours.

## 2024-01-26 NOTE — PLAN OF CARE
Problem: Adult Inpatient Plan of Care  Goal: Plan of Care Review  Outcome: Ongoing, Progressing  Goal: Patient-Specific Goal (Individualized)  Outcome: Ongoing, Progressing  Goal: Absence of Hospital-Acquired Illness or Injury  Outcome: Ongoing, Progressing  Goal: Optimal Comfort and Wellbeing  Outcome: Ongoing, Progressing  Goal: Readiness for Transition of Care  Outcome: Ongoing, Progressing  Intervention: Mutually Develop Transition Plan  Flowsheets (Taken 1/26/2024 1603)  Transportation Anticipated: family or friend will provide     Problem: Fall Injury Risk  Goal: Absence of Fall and Fall-Related Injury  Outcome: Ongoing, Progressing  Intervention: Identify and Manage Contributors  Flowsheets (Taken 1/26/2024 1603)  Self-Care Promotion: independence encouraged  Medication Review/Management: medications reviewed

## 2024-01-26 NOTE — PROGRESS NOTES
[facility] - Medical Surgical Unit  Hospital Medicine  Progress Note    Patient Name: Windy Swann  MRN: [id number]  Patient Class: IP- Swing   Admission Date: 1/23/2024  Length of Stay: 3 days  Attending Physician: Poli Hines IV, DO  Primary Care Provider: Alyssa, Primary Doctor        Subjective:     Principal Problem:Generalized weakness        HPI:  89 year old female with an extensive PMH presents to the hospital with shortness of breath and chest pain.  She was found to be in atrial fibrillation on admission and placed on an Amiodarone drip. She was placed in the ICU on a NTG drip for her HTN.  She was also placed on Bipap.  She was diuresed.  The patient told Cardiology she did not want invasive/extensive testing for her medical conditions.   She has new onset afib, systolic CHF, and severe MR.  She has been optimized medically.. Would benefit from swing bed services to maximize her function.  She lives alone in [place]. She is being transferred to [place] for swing. At the end of that stay would likely benefit from transition to home hospice. Other option would be home health.          Above info is from [place] summary [facility] 01/14/24-01/24-01/23/24.  Mrs. Swann was admitted to swing bed here at [facility] around 7 pm last night. On my assessment this morning. She is awake and alert, very talkative. Denies pain and SOB. Has o2 per nc at 2 liters. States she lives alone, uses a walker for ambulation. States she plans to dc home alone.      Overview/Hospital Course:  01/25/24 Awake and resting in bed. Complains of having nasal congestion earlier this morning. O2 sat on 2 liters is 96%. Continue therapy for strengthening.  01/25/24 notified that pt had assisted fall while in bathroom. PTA states she assisted her to floor . Pt.blames fall on nonskid socks. Mrs. Swann denies injuring herself. Did not hit head. No apparent injury noted.  01/26/24 Awake and sitting up in reclining  chair. Complains of having anxiety if her door to her room is shut. Note is on door asking for it to be left open. Denies pain this morning. Sat is 97%.    Interval History: weakness    Review of Systems   Constitutional:  Negative for appetite change and fatigue.   Respiratory:  Negative for cough and shortness of breath.    Cardiovascular:  Negative for chest pain, palpitations and leg swelling.   Gastrointestinal:  Negative for abdominal pain, constipation, diarrhea, nausea and vomiting.   Genitourinary:  Negative for difficulty urinating.   Musculoskeletal:  Positive for arthralgias.        States all joints are affected - but states she is not currently in pain   Skin:  Negative for color change and wound.   Neurological:  Positive for weakness. Negative for light-headedness and headaches.     Objective:     Vital Signs (Most Recent):  Temp: 97.4 °F (36.3 °C) (01/26/24 0704)  Pulse: 97 (01/26/24 0704)  Resp: 18 (01/26/24 0704)  BP: 128/74 (01/26/24 0704)  SpO2: 98 % (01/26/24 0704) Vital Signs (24h Range):  Temp:  [97.4 °F (36.3 °C)-97.6 °F (36.4 °C)] 97.4 °F (36.3 °C)  Pulse:  [81-97] 97  Resp:  [18-20] 18  SpO2:  [94 %-98 %] 98 %  BP: (128-137)/(74-77) 128/74     Weight: 68.5 kg (151 lb)  Body mass index is 24.37 kg/m².    Intake/Output Summary (Last 24 hours) at 1/26/2024 0900  Last data filed at 1/26/2024 0851  Gross per 24 hour   Intake 720 ml   Output --   Net 720 ml         Physical Exam  HENT:      Head: Normocephalic.      Mouth/Throat:      Mouth: Mucous membranes are moist.   Cardiovascular:      Rate and Rhythm: Normal rate and regular rhythm.      Heart sounds: Murmur heard.   Pulmonary:      Effort: Pulmonary effort is normal.      Breath sounds: Normal breath sounds.      Comments: O2 per nc at 2 liters  Abdominal:      General: Bowel sounds are normal. There is no distension.      Palpations: Abdomen is soft. There is no mass.      Tenderness: There is no abdominal tenderness. There is no  guarding or rebound.      Hernia: No hernia is present.   Musculoskeletal:         General: Normal range of motion.      Cervical back: Normal range of motion.   Skin:     General: Skin is warm and dry.      Coloration: Skin is not jaundiced or pale.      Findings: No bruising or erythema.   Neurological:      Mental Status: She is alert and oriented to person, place, and time.      Motor: Weakness present.   Psychiatric:         Mood and Affect: Mood normal.             Significant Labs: All pertinent labs within the past 24 hours have been reviewed.  Recent Lab Results       None            Significant Imaging: I have reviewed all pertinent imaging results/findings within the past 24 hours.    Assessment/Plan:      * Generalized weakness  01/24/24 PT and OT to evaluate and treat       01/25/24 walked 12 feet with therapist yesterday    Atrial fibrillation with rapid ventricular response  Patient with Paroxysmal (<7 days) atrial fibrillation which is controlled currently with Beta Blocker and Amiodarone. Patient is currently in sinus rhythm.[id number] Score: 3. Anticoagulation indicated. Anticoagulation done with eliquis  .    Systolic congestive heart failure  Patient is identified as having Systolic (HFrEF) heart failure that is Chronic. CHF is currently controlled. Latest ECHO performed and demonstrates- Results for orders placed during the hospital encounter of 01/14/24    Echo    Interpretation Summary    Left Ventricle: The left ventricle is normal in size. Normal wall thickness. There is eccentric hypertrophy. Regional wall motion abnormalities present. Anterior and amy-septal wall appears akinetic There is severely reduced systolic function with a visually estimated ejection fraction of 20 - 25%. Biplane (2D) method of discs ejection fraction is 24%. Unable to assess diastolic function due to atrial fibrillation.    Right Ventricle: Normal right ventricular cavity size. Systolic function is normal.     "Left Atrium: Left atrium is severely dilated.    Right Atrium: Right atrium is severely dilated.    Aortic Valve: The aortic valve is a trileaflet valve.    Mitral Valve: There is posterior leaflet sclerosis. There is annular dilation present. There is severe regurgitation.    Tricuspid Valve: There is severe regurgitation.    Pulmonic Valve: There is mild regurgitation.    Pulmonary Artery: The estimated pulmonary artery systolic pressure is 51 mmHg.    IVC/SVC: Elevated venous pressure at 15 mmHg.    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 Continue Beta Blocker, ACE/ARB, and Furosemide and monitor clinical status closely. Monitor on telemetry. Patient is on CHF pathway.  Monitor strict Is&Os and daily weights. Continue to stress to patient importance of self efficacy and  on diet for CHF. Last BNP reviewed- and noted below No results for input(s): "BNP", "BNPTRIAGEBLO" in the last 168 hours.    Severe mitral regurgitation  Has refused invasive testing, followed by cardiology. Continue current meds. Hospice appropriate.      Hypothyroidism  01/24/24 continue levothyroxine 25 mcg po daily        VTE Risk Mitigation (From admission, onward)           Ordered     apixaban tablet 5 mg  2 times daily         01/23/24 2127                    Discharge Planning   SELWYN:      Code Status: DNR   Is the patient medically ready for discharge?:     Reason for patient still in hospital (select all that apply): Treatment  Discharge Plan A: (P) Home                  ALVARO Roca  Department of Hospital Medicine   [facility] - Medical Surgical Unit    "

## 2024-01-26 NOTE — PT/OT/SLP PROGRESS
Occupational Therapy   Treatment    Name: Windy Swann  MRN: [id number]  Admitting Diagnosis:  Generalized weakness       Recommendations:     Discharge Recommendations: Moderate Intensity Therapy  Discharge Equipment Recommendations:  3-in-1 commode  Barriers to discharge:  None    Assessment:     Windy Swann is a 89 y.o. female with a medical diagnosis of Generalized weakness.  She presents with weakness and decline with ADLs. Performance deficits affecting function are weakness, impaired endurance, impaired self care skills, impaired functional mobility, gait instability, impaired balance, decreased safety awareness, decreased lower extremity function, decreased upper extremity function.     Rehab Prognosis:  Fair; patient would benefit from acute skilled OT services to address these deficits and reach maximum level of function.       Plan:     Patient to be seen 5 x/week to address the above listed problems via self-care/home management, therapeutic activities, therapeutic exercises  Plan of Care Expires: 02/07/24  Plan of Care Reviewed with: patient    Subjective     Chief Complaint: Pain and weakness   Patient/Family Comments/goals: to return to prior level of function  Pain/Comfort:  No pain indicated    Objective:     Communicated with: Nurse prior to session.  Patient found supine with bed alarm, oxygen upon OT entry to room.    General Precautions: Standard, fall    Orthopedic Precautions:N/A  Braces: N/A  Respiratory Status: Room air     Occupational Performance:     Bed Mobility:    Patient completed Rolling/Turning to Left with  minimum assistance  Patient completed Rolling/Turning to Right with minimum assistance  Patient completed Scooting/Bridging with minimum assistance  Patient completed Supine to Sit with minimum assistance  Patient completed Sit to Supine with minimum assistance     Functional Mobility/Transfers:  Patient completed Sit <> Stand Transfer with minimum assistance  with  rolling  walker   Patient completed Bed <> Chair Transfer using Step Transfer technique with minimum assistance with rolling walker  Functional Mobility: Patient transferred bed to chair with CGA    Activities of Daily Living:  Grooming: stand by assistance to perform oral hygiene  Bathing: minimum assistance to perform sponge bath at sinkside  Upper Body Dressing: minimum assistance to ame hospital gown  Lower Body Dressing: moderate assistance to ame underwear and socks  Toileting: minimum assistance to perform toilet hygiene      AMPAC 6 Click ADL:      Treatment & Education:  Pt educated on OT role/POC.   Importance of OOB activity with staff assistance.  Importance of sitting up in the chair throughout the day as tolerated, especially for meals   Safety during functional t/f and mobility  Importance of assisting with self-care activities      Patient left up in chair with all lines intact and call button in reach    GOALS:   Multidisciplinary Problems       Occupational Therapy Goals          Problem: Occupational Therapy    Goal Priority Disciplines Outcome Interventions   Occupational Therapy Goal     OT, PT/OT Ongoing, Progressing    Description: Grooming Status:   Short Term Goal: Pt will perform grooming with SBA sitting EOB.   Long Term Goal: Pt will perform grooming/oral hygiene standing at sink with SBA      LE dressing Status:   Short Term Goal: Pt will perform LE dressing with SBA.   Long Term Goal: Pt will perform LE dressing with Mod I.    Toileting Status:   Short Term Goal: Pt will perform toilet hygiene on BSC with Mod I.  Long Term Goal: Pt will perform toilet hygiene on toilet with no AE with Mod I.    Commode Transfer:   Short Term Goal: Pt will perform BSC t/f with Mod I.  Long Term Goal:  Pt will perform toilet t/f in bathroom with Mod I.     Bathing Status:   Long Term Goal: Pt will perform sponge bath with Mod I with no unsafe fatigue.     Strength Status: 5/5 BUEs  Long Term Goal: Pt to perform  BUE strengthening with weights and/or body weight to increase ADL independence and safety    Endurance Status:   Short Term Goal:pt to perform 15 min OT treatment with 5 or greater rest breaks  Long Term Goal: pt to perform 30 min OT treat with 3 or less rest breaks                          Time Tracking:     OT Date of Treatment: 01/26/24  OT Start Time: 0952  OT Stop Time: 1050  OT Total Time (min): 58 min    Billable Minutes:Self Care/Home Management 45 min  Therapeutic Activity 13 min    GILBERT Tatum/L, CSRS  OT/SOFYA: OT          1/26/2024

## 2024-01-27 PROCEDURE — 99900035 HC TECH TIME PER 15 MIN (STAT)

## 2024-01-27 PROCEDURE — 27000221 HC OXYGEN, UP TO 24 HOURS

## 2024-01-27 PROCEDURE — 25000003 PHARM REV CODE 250: Performed by: NURSE PRACTITIONER

## 2024-01-27 PROCEDURE — 27000982 HC MATTRESS, MATRIX LAL RENTAL

## 2024-01-27 PROCEDURE — 27000944

## 2024-01-27 PROCEDURE — 11000004 HC SNF PRIVATE

## 2024-01-27 PROCEDURE — 94761 N-INVAS EAR/PLS OXIMETRY MLT: CPT

## 2024-01-27 RX ADMIN — METOPROLOL SUCCINATE 100 MG: 50 TABLET, FILM COATED, EXTENDED RELEASE ORAL at 08:01

## 2024-01-27 RX ADMIN — AMIODARONE HYDROCHLORIDE 200 MG: 200 TABLET ORAL at 08:01

## 2024-01-27 RX ADMIN — SENNOSIDES AND DOCUSATE SODIUM 1 TABLET: 8.6; 5 TABLET ORAL at 08:01

## 2024-01-27 RX ADMIN — ATORVASTATIN CALCIUM 20 MG: 10 TABLET, FILM COATED ORAL at 08:01

## 2024-01-27 RX ADMIN — APIXABAN 5 MG: 2.5 TABLET, FILM COATED ORAL at 08:01

## 2024-01-27 RX ADMIN — ASPIRIN 81 MG: 81 TABLET, COATED ORAL at 08:01

## 2024-01-27 RX ADMIN — FUROSEMIDE 40 MG: 40 TABLET ORAL at 08:01

## 2024-01-27 RX ADMIN — LOSARTAN POTASSIUM 50 MG: 50 TABLET, FILM COATED ORAL at 08:01

## 2024-01-27 RX ADMIN — MUPIROCIN: 20 OINTMENT TOPICAL at 08:01

## 2024-01-27 RX ADMIN — LEVOTHYROXINE SODIUM 25 MCG: 0.03 TABLET ORAL at 05:01

## 2024-01-27 NOTE — PLAN OF CARE
Problem: Adult Inpatient Plan of Care  Goal: Plan of Care Review  Outcome: Ongoing, Progressing  Goal: Patient-Specific Goal (Individualized)  Outcome: Ongoing, Progressing  Goal: Absence of Hospital-Acquired Illness or Injury  Outcome: Ongoing, Progressing  Goal: Optimal Comfort and Wellbeing  Outcome: Ongoing, Progressing  Goal: Readiness for Transition of Care  Outcome: Ongoing, Progressing  Intervention: Mutually Develop Transition Plan  Flowsheets (Taken 1/27/2024 1117)  Transportation Anticipated: family or friend will provide     Problem: Fall Injury Risk  Goal: Absence of Fall and Fall-Related Injury  Outcome: Ongoing, Progressing  Intervention: Identify and Manage Contributors  Flowsheets (Taken 1/27/2024 1117)  Self-Care Promotion: independence encouraged  Medication Review/Management: medications reviewed

## 2024-01-28 PROCEDURE — 11000004 HC SNF PRIVATE

## 2024-01-28 PROCEDURE — 25000003 PHARM REV CODE 250: Performed by: NURSE PRACTITIONER

## 2024-01-28 PROCEDURE — 27000944

## 2024-01-28 PROCEDURE — 99900035 HC TECH TIME PER 15 MIN (STAT)

## 2024-01-28 PROCEDURE — 27000982 HC MATTRESS, MATRIX LAL RENTAL

## 2024-01-28 PROCEDURE — 27000221 HC OXYGEN, UP TO 24 HOURS

## 2024-01-28 PROCEDURE — 94761 N-INVAS EAR/PLS OXIMETRY MLT: CPT

## 2024-01-28 RX ADMIN — FUROSEMIDE 40 MG: 40 TABLET ORAL at 08:01

## 2024-01-28 RX ADMIN — LEVOTHYROXINE SODIUM 25 MCG: 0.03 TABLET ORAL at 05:01

## 2024-01-28 RX ADMIN — APIXABAN 5 MG: 2.5 TABLET, FILM COATED ORAL at 08:01

## 2024-01-28 RX ADMIN — AMIODARONE HYDROCHLORIDE 200 MG: 200 TABLET ORAL at 08:01

## 2024-01-28 RX ADMIN — SENNOSIDES AND DOCUSATE SODIUM 1 TABLET: 8.6; 5 TABLET ORAL at 08:01

## 2024-01-28 RX ADMIN — LOSARTAN POTASSIUM 50 MG: 50 TABLET, FILM COATED ORAL at 08:01

## 2024-01-28 RX ADMIN — METOPROLOL SUCCINATE 100 MG: 50 TABLET, FILM COATED, EXTENDED RELEASE ORAL at 08:01

## 2024-01-28 RX ADMIN — ASPIRIN 81 MG: 81 TABLET, COATED ORAL at 08:01

## 2024-01-28 RX ADMIN — MUPIROCIN: 20 OINTMENT TOPICAL at 08:01

## 2024-01-28 RX ADMIN — ATORVASTATIN CALCIUM 20 MG: 10 TABLET, FILM COATED ORAL at 08:01

## 2024-01-28 NOTE — PLAN OF CARE
Problem: Adult Inpatient Plan of Care  Goal: Plan of Care Review  Outcome: Ongoing, Progressing  Goal: Patient-Specific Goal (Individualized)  Outcome: Ongoing, Progressing  Goal: Absence of Hospital-Acquired Illness or Injury  Outcome: Ongoing, Progressing  Goal: Optimal Comfort and Wellbeing  Outcome: Ongoing, Progressing  Goal: Readiness for Transition of Care  Outcome: Ongoing, Progressing  Intervention: Mutually Develop Transition Plan  Flowsheets (Taken 1/28/2024 1318)  Transportation Anticipated: family or friend will provide     Problem: Fall Injury Risk  Goal: Absence of Fall and Fall-Related Injury  Outcome: Ongoing, Progressing  Intervention: Identify and Manage Contributors  Flowsheets (Taken 1/28/2024 1318)  Self-Care Promotion: independence encouraged  Medication Review/Management: medications reviewed

## 2024-01-29 LAB
ANION GAP SERPL CALCULATED.3IONS-SCNC: 10 MMOL/L (ref 7–16)
BASOPHILS # BLD AUTO: 0.09 K/UL (ref 0–0.2)
BASOPHILS NFR BLD AUTO: 1 % (ref 0–1)
BUN SERPL-MCNC: 20 MG/DL (ref 7–18)
BUN/CREAT SERPL: 25 (ref 6–20)
CALCIUM SERPL-MCNC: 9.5 MG/DL (ref 8.5–10.1)
CHLORIDE SERPL-SCNC: 100 MMOL/L (ref 98–107)
CO2 SERPL-SCNC: 30 MMOL/L (ref 21–32)
CREAT SERPL-MCNC: 0.81 MG/DL (ref 0.55–1.02)
DIFFERENTIAL METHOD BLD: ABNORMAL
EGFR (NO RACE VARIABLE) (RUSH/TITUS): 69 ML/MIN/1.73M2
EOSINOPHIL # BLD AUTO: 0.19 K/UL (ref 0–0.5)
EOSINOPHIL NFR BLD AUTO: 2.1 % (ref 1–4)
ERYTHROCYTE [DISTWIDTH] IN BLOOD BY AUTOMATED COUNT: 13 % (ref 11.5–14.5)
GLUCOSE SERPL-MCNC: 107 MG/DL (ref 74–106)
GLUCOSE SERPL-MCNC: 265 MG/DL (ref 70–105)
HCT VFR BLD AUTO: 44.2 % (ref 38–47)
HGB BLD-MCNC: 13.9 G/DL (ref 12–16)
LYMPHOCYTES # BLD AUTO: 1.97 K/UL (ref 1–4.8)
LYMPHOCYTES NFR BLD AUTO: 21.6 % (ref 27–41)
MAGNESIUM SERPL-MCNC: 2.5 MG/DL (ref 1.7–2.3)
MCH RBC QN AUTO: 30.4 PG (ref 27–31)
MCHC RBC AUTO-ENTMCNC: 31.4 G/DL (ref 32–36)
MCV RBC AUTO: 96.7 FL (ref 80–96)
MONOCYTES # BLD AUTO: 0.75 K/UL (ref 0–0.8)
MONOCYTES NFR BLD AUTO: 8.2 % (ref 2–6)
MPC BLD CALC-MCNC: 10.4 FL (ref 9.4–12.4)
NEUTROPHILS # BLD AUTO: 6.14 K/UL (ref 1.8–7.7)
NEUTROPHILS NFR BLD AUTO: 67.1 % (ref 53–65)
PHOSPHATE SERPL-MCNC: 3.3 MG/DL (ref 2.5–4.5)
PLATELET # BLD AUTO: 274 K/UL (ref 150–400)
POTASSIUM SERPL-SCNC: 4 MMOL/L (ref 3.5–5.1)
RBC # BLD AUTO: 4.57 M/UL (ref 4.2–5.4)
SODIUM SERPL-SCNC: 136 MMOL/L (ref 136–145)
WBC # BLD AUTO: 9.14 K/UL (ref 4.5–11)

## 2024-01-29 PROCEDURE — 97110 THERAPEUTIC EXERCISES: CPT

## 2024-01-29 PROCEDURE — 27000221 HC OXYGEN, UP TO 24 HOURS

## 2024-01-29 PROCEDURE — 97110 THERAPEUTIC EXERCISES: CPT | Mod: CQ

## 2024-01-29 PROCEDURE — 99900035 HC TECH TIME PER 15 MIN (STAT)

## 2024-01-29 PROCEDURE — 80048 BASIC METABOLIC PNL TOTAL CA: CPT | Performed by: NURSE PRACTITIONER

## 2024-01-29 PROCEDURE — 83735 ASSAY OF MAGNESIUM: CPT | Performed by: NURSE PRACTITIONER

## 2024-01-29 PROCEDURE — 27000982 HC MATTRESS, MATRIX LAL RENTAL

## 2024-01-29 PROCEDURE — 97116 GAIT TRAINING THERAPY: CPT | Mod: CQ

## 2024-01-29 PROCEDURE — 85025 COMPLETE CBC W/AUTO DIFF WBC: CPT | Performed by: NURSE PRACTITIONER

## 2024-01-29 PROCEDURE — 94761 N-INVAS EAR/PLS OXIMETRY MLT: CPT

## 2024-01-29 PROCEDURE — 82962 GLUCOSE BLOOD TEST: CPT

## 2024-01-29 PROCEDURE — 11000004 HC SNF PRIVATE

## 2024-01-29 PROCEDURE — 97530 THERAPEUTIC ACTIVITIES: CPT

## 2024-01-29 PROCEDURE — 84100 ASSAY OF PHOSPHORUS: CPT | Performed by: NURSE PRACTITIONER

## 2024-01-29 PROCEDURE — 27000944

## 2024-01-29 PROCEDURE — 25000003 PHARM REV CODE 250: Performed by: NURSE PRACTITIONER

## 2024-01-29 PROCEDURE — 99307 SBSQ NF CARE SF MDM 10: CPT | Mod: ,,, | Performed by: NURSE PRACTITIONER

## 2024-01-29 RX ADMIN — METOPROLOL SUCCINATE 100 MG: 50 TABLET, FILM COATED, EXTENDED RELEASE ORAL at 08:01

## 2024-01-29 RX ADMIN — APIXABAN 5 MG: 2.5 TABLET, FILM COATED ORAL at 08:01

## 2024-01-29 RX ADMIN — AMIODARONE HYDROCHLORIDE 200 MG: 200 TABLET ORAL at 08:01

## 2024-01-29 RX ADMIN — LOSARTAN POTASSIUM 50 MG: 50 TABLET, FILM COATED ORAL at 08:01

## 2024-01-29 RX ADMIN — SENNOSIDES AND DOCUSATE SODIUM 1 TABLET: 8.6; 5 TABLET ORAL at 08:01

## 2024-01-29 RX ADMIN — FUROSEMIDE 40 MG: 40 TABLET ORAL at 08:01

## 2024-01-29 RX ADMIN — MUPIROCIN: 20 OINTMENT TOPICAL at 08:01

## 2024-01-29 RX ADMIN — LEVOTHYROXINE SODIUM 25 MCG: 0.03 TABLET ORAL at 07:01

## 2024-01-29 RX ADMIN — ASPIRIN 81 MG: 81 TABLET, COATED ORAL at 08:01

## 2024-01-29 RX ADMIN — ATORVASTATIN CALCIUM 20 MG: 10 TABLET, FILM COATED ORAL at 08:01

## 2024-01-29 NOTE — PT/OT/SLP PROGRESS
Occupational Therapy   Treatment    Name: Windy Swann  MRN: [id number]  Admitting Diagnosis:  Generalized weakness       Recommendations:     Discharge Recommendations: Moderate Intensity Therapy  Discharge Equipment Recommendations:  3-in-1 commode  Barriers to discharge:  None    Assessment:     Windy Swann is a 89 y.o. female with a medical diagnosis of Generalized weakness.  She presents with weakness and decline with ADLs. Performance deficits affecting function are weakness, impaired endurance, impaired self care skills, impaired functional mobility, gait instability, impaired balance, decreased safety awareness, decreased lower extremity function, decreased upper extremity function.     Rehab Prognosis:  Good; patient would benefit from acute skilled OT services to address these deficits and reach maximum level of function.       Plan:     Patient to be seen 5 x/week to address the above listed problems via self-care/home management, therapeutic activities, therapeutic exercises  Plan of Care Expires: 02/07/24  Plan of Care Reviewed with: patient    Subjective     Chief Complaint: Pain and weakness  Patient/Family Comments/goals: to return to prior level of function  Pain/Comfort:       Objective:     Communicated with: Nurse prior to session.  Patient found supine with bed alarm, oxygen upon OT entry to room.    General Precautions: Standard, fall    Orthopedic Precautions:N/A  Braces: N/A  Respiratory Status: Room air     Occupational Performance:     Bed Mobility:    Patient sitting up in a chair upon arrival of Occupational Therapy    Functional Mobility/Transfers:  Patient completed Sit <> Stand Transfer with contact guard assistance  with  rolling walker   Patient completed Bed <> Chair Transfer using Step Transfer technique with contact guard assistance with rolling walker  Functional Mobility: Patient ambulated within room with no difficulty    Activities of Daily Living:  Upper Body Dressing:  stand by assistance to [facility] gown  Lower Body Dressing: moderate assistance to ame socks      AMPAC 6 Click ADL:      Treatment & Education:  Pt performed B UE strengthening exercises to include:   UE bike x 6 min  Shoulder flexion   Chest press    Elbow flexion   Elbow extension   Pectoral stretches   Bilateral rowing  All exercises performed 2x10 reps using 2# dowel and red theraband.     Patient left up in chair with all lines intact, call button in reach, and chair alarm on    GOALS:   Multidisciplinary Problems       Occupational Therapy Goals          Problem: Occupational Therapy    Goal Priority Disciplines Outcome Interventions   Occupational Therapy Goal     OT, PT/OT Ongoing, Progressing    Description: Grooming Status:   Short Term Goal: Pt will perform grooming with SBA sitting EOB.   Long Term Goal: Pt will perform grooming/oral hygiene standing at sink with SBA      LE dressing Status:   Short Term Goal: Pt will perform LE dressing with SBA.   Long Term Goal: Pt will perform LE dressing with Mod I.    Toileting Status:   Short Term Goal: Pt will perform toilet hygiene on BSC with Mod I.  Long Term Goal: Pt will perform toilet hygiene on toilet with no AE with Mod I.    Commode Transfer:   Short Term Goal: Pt will perform BSC t/f with Mod I.  Long Term Goal:  Pt will perform toilet t/f in bathroom with Mod I.     Bathing Status:   Long Term Goal: Pt will perform sponge bath with Mod I with no unsafe fatigue.     Strength Status: 5/5 BUEs  Long Term Goal: Pt to perform BUE strengthening with weights and/or body weight to increase ADL independence and safety    Endurance Status:   Short Term Goal:pt to perform 15 min OT treatment with 5 or greater rest breaks  Long Term Goal: pt to perform 30 min OT treat with 3 or less rest breaks                          Time Tracking:     OT Date of Treatment: 01/29/24  OT Start Time: 0931  OT Stop Time: 1010  OT Total Time (min): 39 min    Billable  Minutes:Therapeutic Activity 25 min  Therapeutic Exercise 14 min    Lacy Young, OTR/L, CSRS  OT/SOFYA: OT          1/29/2024

## 2024-01-29 NOTE — SUBJECTIVE & OBJECTIVE
Interval History: weakness    Review of Systems   Constitutional:  Negative for appetite change and fatigue.   Respiratory:  Negative for cough and shortness of breath.    Cardiovascular:  Negative for chest pain, palpitations and leg swelling.   Gastrointestinal:  Negative for abdominal pain, constipation, diarrhea, nausea and vomiting.   Genitourinary:  Negative for difficulty urinating.   Musculoskeletal:  Positive for arthralgias.        States all joints are affected - but states she is not currently in pain   Skin:  Negative for color change and wound.   Neurological:  Positive for weakness. Negative for light-headedness and headaches.     Objective:     Vital Signs (Most Recent):  Temp: 97.6 °F (36.4 °C) (01/29/24 0732)  Pulse: 94 (01/29/24 0732)  Resp: 20 (01/29/24 0732)  BP: (!) 141/81 (01/29/24 0732)  SpO2: 96 % (01/29/24 0732) Vital Signs (24h Range):  Temp:  [97.6 °F (36.4 °C)-98.2 °F (36.8 °C)] 97.6 °F (36.4 °C)  Pulse:  [] 94  Resp:  [20] 20  SpO2:  [95 %-98 %] 96 %  BP: (118-141)/(70-81) 141/81     Weight: 68.5 kg (151 lb)  Body mass index is 24.37 kg/m².    Intake/Output Summary (Last 24 hours) at 1/29/2024 0826  Last data filed at 1/29/2024 0400  Gross per 24 hour   Intake 940 ml   Output --   Net 940 ml         Physical Exam  HENT:      Head: Normocephalic.      Mouth/Throat:      Mouth: Mucous membranes are moist.   Cardiovascular:      Rate and Rhythm: Normal rate and regular rhythm.      Heart sounds: Murmur heard.   Pulmonary:      Effort: Pulmonary effort is normal.      Breath sounds: Normal breath sounds.      Comments: O2 per nc at 2 liters  Abdominal:      General: Bowel sounds are normal. There is no distension.      Palpations: Abdomen is soft. There is no mass.      Tenderness: There is no abdominal tenderness. There is no guarding or rebound.      Hernia: No hernia is present.   Musculoskeletal:         General: Normal range of motion.      Cervical back: Normal range of motion.    Skin:     General: Skin is warm and dry.      Coloration: Skin is not jaundiced or pale.      Findings: No bruising or erythema.   Neurological:      Mental Status: She is alert and oriented to person, place, and time.      Motor: Weakness present.   Psychiatric:         Mood and Affect: Mood normal.             Significant Labs: All pertinent labs within the past 24 hours have been reviewed.  Recent Lab Results         01/29/24  0612   01/29/24  0420        Baso #   0.09       Basophil %   1.0       Differential Method   Auto       Eos #   0.19       Eosinophil %   2.1       Hematocrit   44.2       Hemoglobin   13.9       Lymph #   1.97       Lymph %   21.6       MCH   30.4       MCHC   31.4       MCV   96.7       Mono #   0.75       Mono %   8.2       MPV   10.4       Neutrophils, Abs   6.14       Neutrophils Relative   67.1       Platelet Count   274       POC Glucose 265         RBC   4.57       RDW   13.0       WBC   9.14               Significant Imaging: I have reviewed all pertinent imaging results/findings within the past 24 hours.

## 2024-01-29 NOTE — PT/OT/SLP PROGRESS
Physical Therapy Treatment    Patient Name:  Windy Swann   MRN:  [id number]    Recommendations:     Discharge Recommendations: Moderate Intensity Therapy  Discharge Equipment Recommendations: 3-in-1 commode  Barriers to discharge: Decreased caregiver support    Assessment:     Windy Swann is a 89 y.o. female admitted with a medical diagnosis of Generalized weakness.  She presents with the following impairments/functional limitations: weakness, impaired endurance, impaired self care skills, impaired functional mobility, gait instability, impaired balance, decreased safety awareness, decreased lower extremity function Patient was willing to participate with therapy today. Patient did have improvements with ambulation distance, but does become dizzy and will not let therapist know. Due to patient's unwillingness to inform therapist of dizziness, patient requires contact guard - minimum assistance while ambulating.     Rehab Prognosis: Good; patient would benefit from acute skilled PT services to address these deficits and reach maximum level of function.    Recent Surgery: * No surgery found *      Plan:     During this hospitalization, patient to be seen 5 x/week to address the identified rehab impairments via gait training, therapeutic activities, therapeutic exercises, neuromuscular re-education and progress toward the following goals:    Plan of Care Expires:  02/14/24    Subjective     Chief Complaint: weakness  Patient/Family Comments/goals: return home  Pain/Comfort:  Pain Rating 1: 0/10  Pain Rating Post-Intervention 1: 0/10      Objective:     Communicated with OT prior to session.  Patient found HOB elevated with oxygen upon PT entry to room.     General Precautions: Standard, fall  Orthopedic Precautions: N/A  Braces: N/A  Respiratory Status: Nasal cannula, flow 2 L/min     Functional Mobility:  Bed Mobility:     Supine to Sit: modified independence  Transfers:     Sit to Stand:  contact guard assistance  with rolling walker  Bed to Chair: minimum assistance with  rolling walker  using  Step Transfer  Gait: Patient ambulated 10', 20', and 60' with RW and 2L of O2. Patient requires CGA - Min A while ambulating. Patient does not inform therapist when she becomes dizzy, but does begin to sway. So, when therapist notices signs of dizziness she is returned back to her seat and instructed to take deep breathes while resting. Patient received education on making it aware when she becomes dizzy in order to maintain safety when performing task.      AM-PAC 6 CLICK MOBILITY  Turning over in bed (including adjusting bedclothes, sheets and blankets)?: 3  Sitting down on and standing up from a chair with arms (e.g., wheelchair, bedside commode, etc.): 3  Moving from lying on back to sitting on the side of the bed?: 3  Moving to and from a bed to a chair (including a wheelchair)?: 3  Need to walk in hospital room?: 3  Climbing 3-5 steps with a railing?: 2  Basic Mobility Total Score: 17       Treatment & Education:  Transfers and ambulation as listed above.   Long arc quads - 10 reps   Seated hip flexion - 10 reps   Ankle pumps - 20 reps     Patient left up in chair with  OT present..    GOALS:   Multidisciplinary Problems       Physical Therapy Goals          Problem: Physical Therapy    Goal Priority Disciplines Outcome Goal Variances Interventions   Physical Therapy Goal     PT, PT/OT                          Time Tracking:     PT Received On: 01/29/24  PT Start Time: 0851     PT Stop Time: 0931  PT Total Time (min): 40 min     Billable Minutes: Gait Training 25 and Therapeutic Exercise 15    Treatment Type: Treatment  PT/PTA: PTA     Number of PTA visits since last PT visit: 3   HARIS Calhoun  01/29/2024

## 2024-01-29 NOTE — ASSESSMENT & PLAN NOTE
Patient with Paroxysmal (<7 days) atrial fibrillation which is controlled currently with Beta Blocker and Amiodarone. Patient is currently in sinus rhythm.[id number] Score: 3. Anticoagulation indicated. Anticoagulation done with eliquis  .    01/29/24 continue eliquis

## 2024-01-29 NOTE — ASSESSMENT & PLAN NOTE
01/24/24 PT and OT to evaluate and treat       01/25/24 walked 12 feet with therapist yesterday    01/29/24 continue therapy

## 2024-01-29 NOTE — PROGRESS NOTES
No new wt.  RDN visited pt this a.m. and pt denied complaints or requests.  Meal intake %. BUN 20, Last BM 1/27. Current intake meets goals.   Continue to follow for intake support.

## 2024-01-29 NOTE — PLAN OF CARE
Problem: Adult Inpatient Plan of Care  Goal: Plan of Care Review  Outcome: Ongoing, Progressing  Goal: Patient-Specific Goal (Individualized)  Outcome: Ongoing, Progressing  Goal: Absence of Hospital-Acquired Illness or Injury  Outcome: Ongoing, Progressing  Goal: Optimal Comfort and Wellbeing  Outcome: Ongoing, Progressing  Goal: Readiness for Transition of Care  Outcome: Ongoing, Progressing     Problem: Fall Injury Risk  Goal: Absence of Fall and Fall-Related Injury  Outcome: Ongoing, Progressing     Problem: Breathing Pattern Ineffective  Goal: Effective Breathing Pattern  Outcome: Ongoing, Progressing     Problem: Gas Exchange Impaired  Goal: Optimal Gas Exchange  Outcome: Ongoing, Progressing

## 2024-01-29 NOTE — PROGRESS NOTES
[facility]  Hospital Medicine  Progress Note    Patient Name: Windy Swann  MRN: [id number]  Patient Class: IP- Swing   Admission Date: 1/23/2024  Length of Stay: 6 days  Attending Physician: Poli Hines IV, DO  Primary Care Provider: Alyssa, Primary Doctor        Subjective:     Principal Problem:Generalized weakness    [facility]          HPI:  89 year old female with an extensive PMH presents to the hospital with shortness of breath and chest pain.  She was found to be in atrial fibrillation on admission and placed on an Amiodarone drip. She was placed in the ICU on a NTG drip for her HTN.  She was also placed on Bipap.  She was diuresed.  The patient told Cardiology she did not want invasive/extensive testing for her medical conditions.   She has new onset afib, systolic CHF, and severe MR.  She has been optimized medically.. Would benefit from swing bed services to maximize her function.  She lives alone in [place]. She is being transferred to [place] for swing. At the end of that stay would likely benefit from transition to home hospice. Other option would be home health.          Above info is from dc summary [facility] Stay 01/14/24-01/24-01/23/24.  Mrs. Swann was admitted to swing bed here at [facility] around 7 pm last night. On my assessment this morning. She is awake and alert, very talkative. Denies pain and SOB. Has o2 per nc at 2 liters. States she lives alone, uses a walker for ambulation. States she plans to dc home alone.      Overview/Hospital Course:  01/25/24 Awake and resting in bed. Complains of having nasal congestion earlier this morning. O2 sat on 2 liters is 96%. Continue therapy for strengthening.  01/25/24 notified that pt had assisted fall while in bathroom. PTA states she assisted her to floor . Pt.blames fall on nonskid socks. Mrs. Swann denies injuring herself. Did not hit head. No apparent  injury noted.  01/26/24 Awake and sitting up in reclining chair. Complains of having anxiety if her door to her room is shut. Note is on door asking for it to be left open. Denies pain this morning. Sat is 97%.  01/29/24 Awake and resting in bed without complaints. Today is her 89st birthday . Wished her a happy birthday.  Continue therapy for strengthening.    Interval History: weakness    Review of Systems   Constitutional:  Negative for appetite change and fatigue.   Respiratory:  Negative for cough and shortness of breath.    Cardiovascular:  Negative for chest pain, palpitations and leg swelling.   Gastrointestinal:  Negative for abdominal pain, constipation, diarrhea, nausea and vomiting.   Genitourinary:  Negative for difficulty urinating.   Musculoskeletal:  Positive for arthralgias.        States all joints are affected - but states she is not currently in pain   Skin:  Negative for color change and wound.   Neurological:  Positive for weakness. Negative for light-headedness and headaches.     Objective:     Vital Signs (Most Recent):  Temp: 97.6 °F (36.4 °C) (01/29/24 0732)  Pulse: 94 (01/29/24 0732)  Resp: 20 (01/29/24 0732)  BP: (!) 141/81 (01/29/24 0732)  SpO2: 96 % (01/29/24 0732) Vital Signs (24h Range):  Temp:  [97.6 °F (36.4 °C)-98.2 °F (36.8 °C)] 97.6 °F (36.4 °C)  Pulse:  [] 94  Resp:  [20] 20  SpO2:  [95 %-98 %] 96 %  BP: (118-141)/(70-81) 141/81     Weight: 68.5 kg (151 lb)  Body mass index is 24.37 kg/m².    Intake/Output Summary (Last 24 hours) at 1/29/2024 0826  Last data filed at 1/29/2024 0400  Gross per 24 hour   Intake 940 ml   Output --   Net 940 ml         Physical Exam  HENT:      Head: Normocephalic.      Mouth/Throat:      Mouth: Mucous membranes are moist.   Cardiovascular:      Rate and Rhythm: Normal rate and regular rhythm.      Heart sounds: Murmur heard.   Pulmonary:      Effort: Pulmonary effort is normal.      Breath sounds: Normal breath sounds.      Comments: O2 per  nc at 2 liters  Abdominal:      General: Bowel sounds are normal. There is no distension.      Palpations: Abdomen is soft. There is no mass.      Tenderness: There is no abdominal tenderness. There is no guarding or rebound.      Hernia: No hernia is present.   Musculoskeletal:         General: Normal range of motion.      Cervical back: Normal range of motion.   Skin:     General: Skin is warm and dry.      Coloration: Skin is not jaundiced or pale.      Findings: No bruising or erythema.   Neurological:      Mental Status: She is alert and oriented to person, place, and time.      Motor: Weakness present.   Psychiatric:         Mood and Affect: Mood normal.             Significant Labs: All pertinent labs within the past 24 hours have been reviewed.  Recent Lab Results         01/29/24  0612   01/29/24  0420        Baso #   0.09       Basophil %   1.0       Differential Method   Auto       Eos #   0.19       Eosinophil %   2.1       Hematocrit   44.2       Hemoglobin   13.9       Lymph #   1.97       Lymph %   21.6       MCH   30.4       MCHC   31.4       MCV   96.7       Mono #   0.75       Mono %   8.2       MPV   10.4       Neutrophils, Abs   6.14       Neutrophils Relative   67.1       Platelet Count   274       POC Glucose 265         RBC   4.57       RDW   13.0       WBC   9.14               Significant Imaging: I have reviewed all pertinent imaging results/findings within the past 24 hours.    Assessment/Plan:      * Generalized weakness  01/24/24 PT and OT to evaluate and treat       01/25/24 walked 12 feet with therapist yesterday    01/29/24 continue therapy    Atrial fibrillation with rapid ventricular response  Patient with Paroxysmal (<7 days) atrial fibrillation which is controlled currently with Beta Blocker and Amiodarone. Patient is currently in sinus rhythm.[id number] Score: 3. Anticoagulation indicated. Anticoagulation done with eliquis  .    01/29/24 continue eliquis     Systolic congestive  "heart failure  Patient is identified as having Systolic (HFrEF) heart failure that is Chronic. CHF is currently controlled. Latest ECHO performed and demonstrates- Results for orders placed during the hospital encounter of 01/14/24    Echo    Interpretation Summary    Left Ventricle: The left ventricle is normal in size. Normal wall thickness. There is eccentric hypertrophy. Regional wall motion abnormalities present. Anterior and amy-septal wall appears akinetic There is severely reduced systolic function with a visually estimated ejection fraction of 20 - 25%. Biplane (2D) method of discs ejection fraction is 24%. Unable to assess diastolic function due to atrial fibrillation.    Right Ventricle: Normal right ventricular cavity size. Systolic function is normal.    Left Atrium: Left atrium is severely dilated.    Right Atrium: Right atrium is severely dilated.    Aortic Valve: The aortic valve is a trileaflet valve.    Mitral Valve: There is posterior leaflet sclerosis. There is annular dilation present. There is severe regurgitation.    Tricuspid Valve: There is severe regurgitation.    Pulmonic Valve: There is mild regurgitation.    Pulmonary Artery: The estimated pulmonary artery systolic pressure is 51 mmHg.    IVC/SVC: Elevated venous pressure at 15 mmHg.    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 Continue Beta Blocker, ACE/ARB, and Furosemide and monitor clinical status closely. Monitor on telemetry. Patient is on CHF pathway.  Monitor strict Is&Os and daily weights. Continue to stress to patient importance of self efficacy and  on diet for CHF. Last BNP reviewed- and noted below No results for input(s): "BNP", "BNPTRIAGEBLO" in the last 168 hours.    Severe mitral regurgitation  Has refused invasive testing, followed by cardiology. Continue current meds. Hospice appropriate.      Hypothyroidism  01/24/24 continue " levothyroxine 25 mcg po daily    01/29/24 continue levothyroxine       VTE Risk Mitigation (From admission, onward)           Ordered     apixaban tablet 5 mg  2 times daily         01/23/24 2127                    Discharge Planning   SELWYN:      Code Status: DNR   Is the patient medically ready for discharge?:     Reason for patient still in hospital (select all that apply): Treatment  Discharge Plan A: (P) Home                  ALVARO Roca  Department of Hospital Medicine   [facility]

## 2024-01-30 PROCEDURE — 27000221 HC OXYGEN, UP TO 24 HOURS

## 2024-01-30 PROCEDURE — 99308 SBSQ NF CARE LOW MDM 20: CPT | Mod: ,,, | Performed by: FAMILY MEDICINE

## 2024-01-30 PROCEDURE — 11000004 HC SNF PRIVATE

## 2024-01-30 PROCEDURE — 25000003 PHARM REV CODE 250: Performed by: NURSE PRACTITIONER

## 2024-01-30 PROCEDURE — 97110 THERAPEUTIC EXERCISES: CPT

## 2024-01-30 PROCEDURE — 94761 N-INVAS EAR/PLS OXIMETRY MLT: CPT

## 2024-01-30 PROCEDURE — 27000982 HC MATTRESS, MATRIX LAL RENTAL

## 2024-01-30 PROCEDURE — 97530 THERAPEUTIC ACTIVITIES: CPT

## 2024-01-30 PROCEDURE — 97116 GAIT TRAINING THERAPY: CPT | Mod: CQ

## 2024-01-30 PROCEDURE — 27000944

## 2024-01-30 PROCEDURE — 99900035 HC TECH TIME PER 15 MIN (STAT)

## 2024-01-30 RX ADMIN — SENNOSIDES AND DOCUSATE SODIUM 1 TABLET: 8.6; 5 TABLET ORAL at 08:01

## 2024-01-30 RX ADMIN — AMIODARONE HYDROCHLORIDE 200 MG: 200 TABLET ORAL at 08:01

## 2024-01-30 RX ADMIN — MUPIROCIN: 20 OINTMENT TOPICAL at 08:01

## 2024-01-30 RX ADMIN — FUROSEMIDE 40 MG: 40 TABLET ORAL at 08:01

## 2024-01-30 RX ADMIN — ASPIRIN 81 MG: 81 TABLET, COATED ORAL at 08:01

## 2024-01-30 RX ADMIN — APIXABAN 5 MG: 2.5 TABLET, FILM COATED ORAL at 08:01

## 2024-01-30 RX ADMIN — LOSARTAN POTASSIUM 50 MG: 50 TABLET, FILM COATED ORAL at 08:01

## 2024-01-30 RX ADMIN — METOPROLOL SUCCINATE 100 MG: 50 TABLET, FILM COATED, EXTENDED RELEASE ORAL at 08:01

## 2024-01-30 RX ADMIN — LEVOTHYROXINE SODIUM 25 MCG: 0.03 TABLET ORAL at 05:01

## 2024-01-30 RX ADMIN — ATORVASTATIN CALCIUM 20 MG: 10 TABLET, FILM COATED ORAL at 08:01

## 2024-01-30 NOTE — PT/OT/SLP PROGRESS
Physical Therapy Treatment    Patient Name:  Windy Swann   MRN:  [id number]    Recommendations:     Discharge Recommendations: Moderate Intensity Therapy  Discharge Equipment Recommendations: 3-in-1 commode  Barriers to discharge: Decreased caregiver support    Assessment:     Windy Swann is a 89 y.o. female admitted with a medical diagnosis of Generalized weakness.  She presents with the following impairments/functional limitations: weakness, impaired endurance, impaired self care skills, impaired functional mobility, gait instability, impaired balance, decreased safety awareness, decreased lower extremity function Patient was willing to participate with therapy today. Patient did have improvements with ambulation distance, but does still become dizzy and requires reminders to take deep breaths.     Rehab Prognosis: Good; patient would benefit from acute skilled PT services to address these deficits and reach maximum level of function.    Recent Surgery: * No surgery found *      Plan:     During this hospitalization, patient to be seen 5 x/week to address the identified rehab impairments via gait training, therapeutic activities, therapeutic exercises, neuromuscular re-education and progress toward the following goals:    Plan of Care Expires:  02/14/24    Subjective     Chief Complaint: weakness  Patient/Family Comments/goals: return home  Pain/Comfort:  Pain Rating 1: 0/10  Pain Rating Post-Intervention 1: 0/10      Objective:     Communicated with OT prior to session.  Patient found HOB elevated with oxygen upon PT entry to room.     General Precautions: Standard, fall  Orthopedic Precautions: N/A  Braces: N/A  Respiratory Status: Nasal cannula, flow 2 L/min     Functional Mobility:  Transfers:     Sit to Stand:  contact guard assistance with rolling walker  Gait: Patient ambulated 45', 65', and 110' with RW and 2L of O2. Patient requires CGA - Min A while ambulating. Patient does not inform therapist when  she becomes dizzy, but does begin to show signs.      AM-PAC 6 CLICK MOBILITY  Turning over in bed (including adjusting bedclothes, sheets and blankets)?: 3  Sitting down on and standing up from a chair with arms (e.g., wheelchair, bedside commode, etc.): 3  Moving from lying on back to sitting on the side of the bed?: 3  Moving to and from a bed to a chair (including a wheelchair)?: 3  Need to walk in hospital room?: 3  Climbing 3-5 steps with a railing?: 2  Basic Mobility Total Score: 17       Treatment & Education:  Transfers and ambulation as listed above.     Patient left up in chair with call button in reach..    GOALS:   Multidisciplinary Problems       Physical Therapy Goals          Problem: Physical Therapy    Goal Priority Disciplines Outcome Goal Variances Interventions   Physical Therapy Goal     PT, PT/OT                          Time Tracking:     PT Received On: 01/30/24  PT Start Time: 1403     PT Stop Time: 1429  PT Total Time (min): 26 min     Billable Minutes: Gait Training 26    Treatment Type: Treatment  PT/PTA: PTA     Number of PTA visits since last PT visit: 4   HARIS Calhoun  01/30/2024

## 2024-01-30 NOTE — PT/OT/SLP PROGRESS
Occupational Therapy   Treatment    Name: Windy Swann  MRN: [id number]  Admitting Diagnosis:  Generalized weakness       Recommendations:     Discharge Recommendations: Moderate Intensity Therapy  Discharge Equipment Recommendations:  3-in-1 commode  Barriers to discharge:  None    Assessment:     Windy Swann is a 89 y.o. female with a medical diagnosis of Generalized weakness.  She presents with weakness and decline with ADLs. Performance deficits affecting function are weakness, impaired endurance, impaired self care skills, impaired functional mobility, gait instability, impaired balance, decreased safety awareness, decreased lower extremity function, decreased upper extremity function.     Rehab Prognosis:  Fair; patient would benefit from acute skilled OT services to address these deficits and reach maximum level of function.       Plan:     Patient to be seen 5 x/week to address the above listed problems via self-care/home management, therapeutic activities, therapeutic exercises  Plan of Care Expires: 02/07/24  Plan of Care Reviewed with: patient    Subjective     Chief Complaint: Pain and weakness  Patient/Family Comments/goals: to return to prior level of function  Pain/Comfort:       Objective:     Communicated with: Nurse prior to session.  Patient found supine with bed alarm, oxygen upon OT entry to room.    General Precautions: Standard, fall    Orthopedic Precautions:N/A  Braces: N/A  Respiratory Status: Nasal cannula, flow 2 L/min     Occupational Performance:     Bed Mobility:    Patient completed Rolling/Turning to Left with  modified independence  Patient completed Rolling/Turning to Right with modified independence  Patient completed Scooting/Bridging with modified independence  Patient completed Supine to Sit with modified independence     Functional Mobility/Transfers:  Patient completed Sit <> Stand Transfer with minimum assistance  with  rolling walker   Patient completed Bed <> Chair  Transfer using Step Transfer technique with minimum assistance with rolling walker  Functional Mobility: Patient transferred to recliner chair with Min A.    Activities of Daily Living:  Upper Body Dressing: minimum assistance to ame robe  Lower Body Dressing: minimum assistance to stand and ame pant      AMPAC 6 Click ADL:      Treatment & Education:  Pt performed B UE strengthening exercises to include:   Shoulder flexion   Chest press    Elbow flexion   Elbow extension   Pectoral stretches   Bilateral rowing  All exercises performed 3x10 reps using 2# weight and red theraband.     Patient left up in chair with all lines intact, call button in reach, and chair alarm on    GOALS:   Multidisciplinary Problems       Occupational Therapy Goals          Problem: Occupational Therapy    Goal Priority Disciplines Outcome Interventions   Occupational Therapy Goal     OT, PT/OT Ongoing, Progressing    Description: Grooming Status:   Short Term Goal: Pt will perform grooming with SBA sitting EOB.   Long Term Goal: Pt will perform grooming/oral hygiene standing at sink with SBA      LE dressing Status:   Short Term Goal: Pt will perform LE dressing with SBA.   Long Term Goal: Pt will perform LE dressing with Mod I.    Toileting Status:   Short Term Goal: Pt will perform toilet hygiene on BSC with Mod I.  Long Term Goal: Pt will perform toilet hygiene on toilet with no AE with Mod I.    Commode Transfer:   Short Term Goal: Pt will perform BSC t/f with Mod I.  Long Term Goal:  Pt will perform toilet t/f in bathroom with Mod I.     Bathing Status:   Long Term Goal: Pt will perform sponge bath with Mod I with no unsafe fatigue.     Strength Status: 5/5 BUEs  Long Term Goal: Pt to perform BUE strengthening with weights and/or body weight to increase ADL independence and safety    Endurance Status:   Short Term Goal:pt to perform 15 min OT treatment with 5 or greater rest breaks  Long Term Goal: pt to perform 30 min OT treat  with 3 or less rest breaks                          Time Tracking:     OT Date of Treatment: 01/30/24  OT Start Time: 1120  OT Stop Time: 1155  OT Total Time (min): 35 min    Billable Minutes:Therapeutic Activity 15 min   Therapeutic Exercise 20 min    GILBERT Tatum/L, CSRS  OT/SOFYA: OT          1/30/2024

## 2024-01-30 NOTE — PLAN OF CARE
[facility] - Medical Surgical Unit - Swing Bed   Interdisciplinary Team Meeting    Patient: Windy Swann   Today's Date: 1/30/2024   Estimated D/C Date: 2/14/2024        Physician: Poli Hines MD Nurse Practitioner: Davina Gunn NP   Pharmacy: Niya Branstetter, PharmD Unit Director: DON Hatfield   :  Earline Parisi RN Physical/Occupational Therapy:  Monty Quinonez, JORGE L   Speech Therapy: ASHLEY Activity Therapy: Chitra Tran LPN   Nursing: DON Hatfield  Respiratory: Destiny Houser, RT Dietary: Huan Smith  Other:      Nursing  New Symptoms/Problems: None at this time  Last Bowel Movement: 01/30/24   Urine: continent  Nelson: No   Bowel: continent  Constipated: No  Diarrhea: No   Isolation: No  Cognition: WNL  Aspiration Precautions:No  Wound Care: No  Wound Location/Tx:   Comment(s):     Respiratory   O2 Device: Nasal Cannula  O2 Flow: 2L  SpO2: 97%  Neb Tx: Yes  Comment(s): Neb treatments ordered for prn use     Dietary  Nutrition: Cardiac  Comment(s): w/ chopped meats    Speech Therapy  Speech/Swallowing: No current speech or swallowing issues  Comment(s):     Physical Therapy  Gait/Assistive Device: 10'/20'/60' w/ RW w/ CGA vs min A ELOS: Plan to DC 2/14/2024    Transfers: Modified Independent  Bed Mobility: Contact Guard Assistance Range of Motion/Restrictions: N/A  Comment(s):      Occupational Therapy  Eating/Grooming: Independent Toileting: Independent   Bathing: Minimal Assistance Dressing (Upper Body): Minimal Assistance   Dressing (Lower Body): Moderate Assistance Comment(s):      Activity Therapy  Level of participation: Active participation  Comment(s):     Pharmacy  Medication Changes (see MD orders in chart): Yes  Labs Reviewed: Yes  New Lab Orders: Yes  Comment(s): Labs ordered every M/Th      Tx Plan/Recommendations reviewed with family and/or patient on (date) 01/26/2024.  Additional family Conference/Training:   D/C  Plan/Recommendations:  Ms. Swann is undecisive of D/C plan at this time.   SELWYN: 2/14/2024  Comment(s): will attempt to discuss definitive discharge plan with patient

## 2024-01-30 NOTE — PROGRESS NOTES
[facility] - Medical Surgical Unit  Hospital Medicine  Progress Note    Patient Name: Windy Swann  MRN: [id number]  Patient Class: IP- Swing   Admission Date: 1/23/2024  Length of Stay: 7 days  Attending Physician: Poli Hines IV, DO  Primary Care Provider: Alyssa, Primary Doctor        Subjective:     Principal Problem:Generalized weakness        HPI:  89 year old female with an extensive PMH presents to the hospital with shortness of breath and chest pain.  She was found to be in atrial fibrillation on admission and placed on an Amiodarone drip. She was placed in the ICU on a NTG drip for her HTN.  She was also placed on Bipap.  She was diuresed.  The patient told Cardiology she did not want invasive/extensive testing for her medical conditions.   She has new onset afib, systolic CHF, and severe MR.  She has been optimized medically.. Would benefit from swing bed services to maximize her function.  She lives alone in [place]. She is being transferred to [place] for swing. At the end of that stay would likely benefit from transition to home hospice. Other option would be home health.          Above info is from [place] summary [facility] Stay 01/14/24-01/24-01/23/24.  Mrs. Swann was admitted to swing bed here at [facility] around 7 pm last night. On my assessment this morning. She is awake and alert, very talkative. Denies pain and SOB. Has o2 per nc at 2 liters. States she lives alone, uses a walker for ambulation. States she plans to dc home alone.      Overview/Hospital Course:  01/25/24 Awake and resting in bed. Complains of having nasal congestion earlier this morning. O2 sat on 2 liters is 96%. Continue therapy for strengthening.  01/25/24 notified that pt had assisted fall while in bathroom. PTA states she assisted her to floor . Pt.blames fall on nonskid socks. Mrs. Swann denies injuring herself. Did not hit head. No apparent injury noted.  01/26/24 Awake and sitting up in reclining  chair. Complains of having anxiety if her door to her room is shut. Note is on door asking for it to be left open. Denies pain this morning. Sat is 97%.  01/29/24 Awake and resting in bed without complaints. Today is her 89st birthday . Wished her a happy birthday.  Continue therapy for strengthening.  01/30/24 Awake and resting in bed. No complaints. She walked a total of 90 ft with therapist yesterday. Talked with her re plans at [place] - states she knows she will have to dc to nursing home if she is unable to dc home.    Interval History: weakness    Review of Systems   Constitutional:  Negative for appetite change and fatigue.   Respiratory:  Negative for cough and shortness of breath.    Cardiovascular:  Negative for chest pain, palpitations and leg swelling.   Gastrointestinal:  Negative for abdominal pain, constipation, diarrhea, nausea and vomiting.   Genitourinary:  Negative for difficulty urinating.   Musculoskeletal:  Positive for arthralgias.        States all joints are affected - but states she is not currently in pain   Skin:  Negative for color change and wound.   Neurological:  Positive for weakness. Negative for light-headedness and headaches.     Objective:     Vital Signs (Most Recent):  Temp: 98.1 °F (36.7 °C) (01/30/24 0721)  Pulse: 83 (01/30/24 0721)  Resp: 18 (01/30/24 0721)  BP: 123/80 (01/30/24 0721)  SpO2: 97 % (01/30/24 0721) Vital Signs (24h Range):  Temp:  [98 °F (36.7 °C)-98.1 °F (36.7 °C)] 98.1 °F (36.7 °C)  Pulse:  [] 83  Resp:  [18-20] 18  SpO2:  [94 %-98 %] 97 %  BP: (123)/(76-80) 123/80     Weight: 68.5 kg (151 lb)  Body mass index is 24.37 kg/m².    Intake/Output Summary (Last 24 hours) at 1/30/2024 1102  Last data filed at 1/29/2024 1843  Gross per 24 hour   Intake 180 ml   Output 5 ml   Net 175 ml         Physical Exam  HENT:      Head: Normocephalic.      Mouth/Throat:      Mouth: Mucous membranes are moist.   Cardiovascular:      Rate and Rhythm: Normal rate and regular  rhythm.      Heart sounds: Murmur heard.   Pulmonary:      Effort: Pulmonary effort is normal.      Breath sounds: Normal breath sounds.      Comments: O2 per nc at 2 liters  Abdominal:      General: Bowel sounds are normal. There is no distension.      Palpations: Abdomen is soft. There is no mass.      Tenderness: There is no abdominal tenderness. There is no guarding or rebound.      Hernia: No hernia is present.   Musculoskeletal:         General: Normal range of motion.      Cervical back: Normal range of motion.   Skin:     General: Skin is warm and dry.      Coloration: Skin is not jaundiced or pale.      Findings: No bruising or erythema.   Neurological:      Mental Status: She is alert and oriented to person, place, and time.      Motor: Weakness present.   Psychiatric:         Mood and Affect: Mood normal.             Significant Labs: All pertinent labs within the past 24 hours have been reviewed.  Recent Lab Results       None            Significant Imaging: I have reviewed all pertinent imaging results/findings within the past 24 hours.    Assessment/Plan:      * Generalized weakness  01/24/24 PT and OT to evaluate and treat       01/25/24 walked 12 feet with therapist yesterday    01/29/24 continue therapy    01/30/24 continue therapy    Atrial fibrillation with rapid ventricular response  Patient with Paroxysmal (<7 days) atrial fibrillation which is controlled currently with Beta Blocker and Amiodarone. Patient is currently in sinus rhythm.[id number] Score: 3. Anticoagulation indicated. Anticoagulation done with eliquis  .    01/29/24 continue eliquis     01/30/24 continue eliquis     Systolic congestive heart failure  Patient is identified as having Systolic (HFrEF) heart failure that is Chronic. CHF is currently controlled. Latest ECHO performed and demonstrates- Results for orders placed during the hospital encounter of 01/14/24    Echo    Interpretation Summary    Left Ventricle: The left  "ventricle is normal in size. Normal wall thickness. There is eccentric hypertrophy. Regional wall motion abnormalities present. Anterior and amy-septal wall appears akinetic There is severely reduced systolic function with a visually estimated ejection fraction of 20 - 25%. Biplane (2D) method of discs ejection fraction is 24%. Unable to assess diastolic function due to atrial fibrillation.    Right Ventricle: Normal right ventricular cavity size. Systolic function is normal.    Left Atrium: Left atrium is severely dilated.    Right Atrium: Right atrium is severely dilated.    Aortic Valve: The aortic valve is a trileaflet valve.    Mitral Valve: There is posterior leaflet sclerosis. There is annular dilation present. There is severe regurgitation.    Tricuspid Valve: There is severe regurgitation.    Pulmonic Valve: There is mild regurgitation.    Pulmonary Artery: The estimated pulmonary artery systolic pressure is 51 mmHg.    IVC/SVC: Elevated venous pressure at 15 mmHg.    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 Continue Beta Blocker, ACE/ARB, and Furosemide and monitor clinical status closely. Monitor on telemetry. Patient is on CHF pathway.  Monitor strict Is&Os and daily weights. Continue to stress to patient importance of self efficacy and  on diet for CHF. Last BNP reviewed- and noted below No results for input(s): "BNP", "BNPTRIAGEBLO" in the last 168 hours.    Severe mitral regurgitation  Has refused invasive testing, followed by cardiology. Continue current meds. Hospice appropriate.      Hypothyroidism  01/24/24 continue levothyroxine 25 mcg po daily    01/29/24 continue levothyroxine       VTE Risk Mitigation (From admission, onward)           Ordered     apixaban tablet 5 mg  2 times daily         01/23/24 2127                    Discharge Planning   SELWYN: 2/14/2024     Code Status: DNR   Is the patient medically ready " for discharge?:     Reason for patient still in hospital (select all that apply): Treatment  Discharge Plan A: (P) Home                  Poli Hines IV, DO  Department of Hospital Medicine   [facility] - Medical Surgical Unit

## 2024-01-30 NOTE — SUBJECTIVE & OBJECTIVE
Interval History: weakness    Review of Systems   Constitutional:  Negative for appetite change and fatigue.   Respiratory:  Negative for cough and shortness of breath.    Cardiovascular:  Negative for chest pain, palpitations and leg swelling.   Gastrointestinal:  Negative for abdominal pain, constipation, diarrhea, nausea and vomiting.   Genitourinary:  Negative for difficulty urinating.   Musculoskeletal:  Positive for arthralgias.        States all joints are affected - but states she is not currently in pain   Skin:  Negative for color change and wound.   Neurological:  Positive for weakness. Negative for light-headedness and headaches.     Objective:     Vital Signs (Most Recent):  Temp: 98.1 °F (36.7 °C) (01/30/24 0721)  Pulse: 83 (01/30/24 0721)  Resp: 18 (01/30/24 0721)  BP: 123/80 (01/30/24 0721)  SpO2: 97 % (01/30/24 0721) Vital Signs (24h Range):  Temp:  [98 °F (36.7 °C)-98.1 °F (36.7 °C)] 98.1 °F (36.7 °C)  Pulse:  [] 83  Resp:  [18-20] 18  SpO2:  [94 %-98 %] 97 %  BP: (123)/(76-80) 123/80     Weight: 68.5 kg (151 lb)  Body mass index is 24.37 kg/m².    Intake/Output Summary (Last 24 hours) at 1/30/2024 1102  Last data filed at 1/29/2024 1843  Gross per 24 hour   Intake 180 ml   Output 5 ml   Net 175 ml         Physical Exam  HENT:      Head: Normocephalic.      Mouth/Throat:      Mouth: Mucous membranes are moist.   Cardiovascular:      Rate and Rhythm: Normal rate and regular rhythm.      Heart sounds: Murmur heard.   Pulmonary:      Effort: Pulmonary effort is normal.      Breath sounds: Normal breath sounds.      Comments: O2 per nc at 2 liters  Abdominal:      General: Bowel sounds are normal. There is no distension.      Palpations: Abdomen is soft. There is no mass.      Tenderness: There is no abdominal tenderness. There is no guarding or rebound.      Hernia: No hernia is present.   Musculoskeletal:         General: Normal range of motion.      Cervical back: Normal range of motion.    Skin:     General: Skin is warm and dry.      Coloration: Skin is not jaundiced or pale.      Findings: No bruising or erythema.   Neurological:      Mental Status: She is alert and oriented to person, place, and time.      Motor: Weakness present.   Psychiatric:         Mood and Affect: Mood normal.             Significant Labs: All pertinent labs within the past 24 hours have been reviewed.  Recent Lab Results       None            Significant Imaging: I have reviewed all pertinent imaging results/findings within the past 24 hours.

## 2024-01-30 NOTE — ASSESSMENT & PLAN NOTE
Patient with Paroxysmal (<7 days) atrial fibrillation which is controlled currently with Beta Blocker and Amiodarone. Patient is currently in sinus rhythm.[id number] Score: 3. Anticoagulation indicated. Anticoagulation done with eliquis  .    01/29/24 continue eliquis     01/30/24 continue eliquis

## 2024-01-30 NOTE — PLAN OF CARE
Problem: Adult Inpatient Plan of Care  Goal: Optimal Comfort and Wellbeing  Outcome: Ongoing, Progressing     Problem: Fall Injury Risk  Goal: Absence of Fall and Fall-Related Injury  Outcome: Ongoing, Progressing     Problem: Breathing Pattern Ineffective  Goal: Effective Breathing Pattern  Outcome: Ongoing, Progressing

## 2024-01-30 NOTE — PLAN OF CARE
Problem: Adult Inpatient Plan of Care  Goal: Plan of Care Review  Outcome: Ongoing, Progressing  Goal: Patient-Specific Goal (Individualized)  Outcome: Ongoing, Progressing  Goal: Absence of Hospital-Acquired Illness or Injury  Outcome: Ongoing, Progressing  Goal: Optimal Comfort and Wellbeing  Outcome: Ongoing, Progressing     Problem: Fall Injury Risk  Goal: Absence of Fall and Fall-Related Injury  Outcome: Ongoing, Progressing     Problem: Breathing Pattern Ineffective  Goal: Effective Breathing Pattern  Outcome: Ongoing, Progressing     Problem: Gas Exchange Impaired  Goal: Optimal Gas Exchange  Outcome: Ongoing, Progressing

## 2024-01-30 NOTE — PLAN OF CARE
PT AAOX3. Amd very talkative. Resp even. Pt ambulates with walker to Assistance X 1. O2 at 2 liters per nasal cannula. No c/o pain or discomfort voiced. Bed low. SR up X 3. CB within reach. Bed and chair alarm set  Problem: Adult Inpatient Plan of Care  Goal: Plan of Care Review  Outcome: Ongoing, Progressing  Goal: Patient-Specific Goal (Individualized)  Outcome: Ongoing, Progressing  Goal: Absence of Hospital-Acquired Illness or Injury  Outcome: Ongoing, Progressing  Goal: Optimal Comfort and Wellbeing  Outcome: Ongoing, Progressing  Goal: Readiness for Transition of Care  Outcome: Ongoing, Progressing     Problem: Fall Injury Risk  Goal: Absence of Fall and Fall-Related Injury  Outcome: Ongoing, Progressing     Problem: Breathing Pattern Ineffective  Goal: Effective Breathing Pattern  Outcome: Ongoing, Progressing     Problem: Gas Exchange Impaired  Goal: Optimal Gas Exchange  Outcome: Ongoing, Progressing

## 2024-01-30 NOTE — ASSESSMENT & PLAN NOTE
01/24/24 PT and OT to evaluate and treat       01/25/24 walked 12 feet with therapist yesterday    01/29/24 continue therapy    01/30/24 continue therapy

## 2024-01-31 PROCEDURE — 99900035 HC TECH TIME PER 15 MIN (STAT)

## 2024-01-31 PROCEDURE — 27000944

## 2024-01-31 PROCEDURE — 97116 GAIT TRAINING THERAPY: CPT | Mod: CQ

## 2024-01-31 PROCEDURE — 27000221 HC OXYGEN, UP TO 24 HOURS

## 2024-01-31 PROCEDURE — 97110 THERAPEUTIC EXERCISES: CPT

## 2024-01-31 PROCEDURE — 97530 THERAPEUTIC ACTIVITIES: CPT

## 2024-01-31 PROCEDURE — 27000982 HC MATTRESS, MATRIX LAL RENTAL

## 2024-01-31 PROCEDURE — 11000004 HC SNF PRIVATE

## 2024-01-31 PROCEDURE — 25000003 PHARM REV CODE 250: Performed by: NURSE PRACTITIONER

## 2024-01-31 PROCEDURE — 94761 N-INVAS EAR/PLS OXIMETRY MLT: CPT

## 2024-01-31 PROCEDURE — 97530 THERAPEUTIC ACTIVITIES: CPT | Mod: CQ

## 2024-01-31 RX ADMIN — ATORVASTATIN CALCIUM 20 MG: 10 TABLET, FILM COATED ORAL at 08:01

## 2024-01-31 RX ADMIN — MELATONIN TAB 3 MG 6 MG: 3 TAB at 08:01

## 2024-01-31 RX ADMIN — FUROSEMIDE 40 MG: 40 TABLET ORAL at 08:01

## 2024-01-31 RX ADMIN — APIXABAN 5 MG: 2.5 TABLET, FILM COATED ORAL at 08:01

## 2024-01-31 RX ADMIN — SENNOSIDES AND DOCUSATE SODIUM 1 TABLET: 8.6; 5 TABLET ORAL at 08:01

## 2024-01-31 RX ADMIN — LEVOTHYROXINE SODIUM 25 MCG: 0.03 TABLET ORAL at 05:01

## 2024-01-31 RX ADMIN — DOCUSATE SODIUM 100 MG: 100 CAPSULE, LIQUID FILLED ORAL at 08:01

## 2024-01-31 RX ADMIN — METOPROLOL SUCCINATE 100 MG: 50 TABLET, FILM COATED, EXTENDED RELEASE ORAL at 08:01

## 2024-01-31 RX ADMIN — AMIODARONE HYDROCHLORIDE 200 MG: 200 TABLET ORAL at 08:01

## 2024-01-31 RX ADMIN — ASPIRIN 81 MG: 81 TABLET, COATED ORAL at 08:01

## 2024-01-31 RX ADMIN — LOSARTAN POTASSIUM 50 MG: 50 TABLET, FILM COATED ORAL at 08:01

## 2024-01-31 NOTE — PLAN OF CARE
[facility] - Medical Surgical Unit  Discharge Assessment    Primary Care Provider: No, Primary Doctor     Discharge Assessment (most recent)       BRIEF DISCHARGE ASSESSMENT - 01/31/24 4018          Discharge Planning    Assessment Type Discharge Planning Brief Assessment (P)      Resource/Environmental Concerns none (P)      Support Systems Friends/neighbors (P)                    Visited with Ms. Swann to discuss discharge plan.  Ms. Swann says she know she will have to go to the nursing home because she has realized she's unable to care for herself.  She wants to go to [facility], I asked if I had permission to send referral and she says no because she want to discuss it with her step daughter, Elizabeth Zhong.  I asked her if she could call her and she says she doesn't have her number and she had tried calling her friend Marilee Mosher but she didn't answer.  I asked if I could call Ms. Mosher to see if I could get Ms. Zhong's number and she agreed but there wasn't an answer at Ms. Mosher's number.  Will continue to attempt to contact Ms. Mosher.

## 2024-01-31 NOTE — PT/OT/SLP PROGRESS
Physical Therapy Treatment    Patient Name:  Windy Swann   MRN:  [id number]    Recommendations:     Discharge Recommendations: Moderate Intensity Therapy  Discharge Equipment Recommendations: 3-in-1 commode  Barriers to discharge: Decreased caregiver support    Assessment:     Windy Swann is a 89 y.o. female admitted with a medical diagnosis of Generalized weakness.  She presents with the following impairments/functional limitations: weakness, impaired endurance, impaired self care skills, impaired functional mobility, gait instability, impaired balance, decreased safety awareness, decreased lower extremity function Patient agreed to participate in therapy treatment. Patient requires verbal cues for safety and to slow pace and stay inside walker while ambulating.    Rehab Prognosis: Good; patient would benefit from acute skilled PT services to address these deficits and reach maximum level of function.    Recent Surgery: * No surgery found *      Plan:     During this hospitalization, patient to be seen 5 x/week to address the identified rehab impairments via gait training, therapeutic activities, therapeutic exercises, neuromuscular re-education and progress toward the following goals:    Plan of Care Expires:  02/14/24    Subjective     Chief Complaint: weakness  Patient/Family Comments/goals: Patient agreeable to participate in therapy treatment  Pain/Comfort:  Pain Rating 1: 0/10      Objective:     Communicated with PT prior to session.  Patient found up in chair with   upon PT entry to room.     General Precautions: Standard, fall  Orthopedic Precautions: N/A  Braces: N/A  Respiratory Status: Nasal cannula, flow 2 L/min     Functional Mobility:  Transfers:     Sit to Stand:  contact guard assistance with rolling walker  Gait: Patient ambulated distances of 20 feet, 116 feet, 70 feet, and 60 feet using rolling walker, 2 L O2, and with CGA from Therapist. Patient took seated rest breaks between each trial of  ambulation. Patient requires verbal cues to stay inside walker and for safety.      AM-PAC 6 CLICK MOBILITY  Turning over in bed (including adjusting bedclothes, sheets and blankets)?: 3  Sitting down on and standing up from a chair with arms (e.g., wheelchair, bedside commode, etc.): 3  Moving from lying on back to sitting on the side of the bed?: 3  Moving to and from a bed to a chair (including a wheelchair)?: 3  Need to walk in hospital room?: 3  Climbing 3-5 steps with a railing?: 2  Basic Mobility Total Score: 17       Treatment & Education:  Patient performed transfers and gait training as listed above.    Patient left up in chair with call button in reach, chair alarm on, and daughter-in-law present..    GOALS:   Multidisciplinary Problems       Physical Therapy Goals          Problem: Physical Therapy    Goal Priority Disciplines Outcome Goal Variances Interventions   Physical Therapy Goal     PT, PT/OT                          Time Tracking:     PT Received On: 01/31/24  PT Start Time: 1454     PT Stop Time: 1519  PT Total Time (min): 25 min     Billable Minutes: Gait Training 17 and Therapeutic Activity 8       PT/PTA: PTA     Number of PTA visits since last PT visit: 5     01/31/2024

## 2024-01-31 NOTE — PT/OT/SLP PROGRESS
Occupational Therapy   Treatment    Name: Windy Swann  MRN: [id number]  Admitting Diagnosis:  Generalized weakness       Recommendations:     Discharge Recommendations: Moderate Intensity Therapy  Discharge Equipment Recommendations:  3-in-1 commode  Barriers to discharge:       Assessment:     Windy Swann is a 89 y.o. female with a medical diagnosis of Generalized weakness.  She presents with weakness. Performance deficits affecting function are weakness, impaired endurance, impaired self care skills, impaired functional mobility, gait instability, impaired balance, decreased safety awareness, decreased lower extremity function, decreased upper extremity function.     Rehab Prognosis:  Good; patient would benefit from acute skilled OT services to address these deficits and reach maximum level of function.       Plan:     Patient to be seen 5 x/week to address the above listed problems via self-care/home management, therapeutic activities, therapeutic exercises  Plan of Care Expires: 02/07/24  Plan of Care Reviewed with: patient    Subjective     Chief Complaint: Pt c/o fatigue  Patient/Family Comments/goals: return home  Pain/Comfort:       Objective:     Communicated with: Pt prior to session.  Patient found HOB elevated with   upon OT entry to room.    General Precautions: Standard, fall    Orthopedic Precautions:N/A  Braces: N/A  Respiratory Status: Nasal cannula, flow 2 L/min     Occupational Performance:     Bed Mobility:    Pt def     Functional Mobility/Transfers:    Functional Mobility:     Activities of Daily Living:        [facility] 6 Click ADL:      Treatment & Education:  Pt completed BUE elbow flex, sh flex, chest press with 2# dowel and sh IR/ER with red Tband 3x12 ea ex with extended time to complete    Patient left HOB elevated with all lines intact and call button in reach    GOALS:   Multidisciplinary Problems       Occupational Therapy Goals          Problem: Occupational Therapy    Goal Priority  Disciplines Outcome Interventions   Occupational Therapy Goal     OT, PT/OT Ongoing, Progressing    Description: Grooming Status:   Short Term Goal: Pt will perform grooming with SBA sitting EOB.   Long Term Goal: Pt will perform grooming/oral hygiene standing at sink with SBA      LE dressing Status:   Short Term Goal: Pt will perform LE dressing with SBA.   Long Term Goal: Pt will perform LE dressing with Mod I.    Toileting Status:   Short Term Goal: Pt will perform toilet hygiene on BSC with Mod I.  Long Term Goal: Pt will perform toilet hygiene on toilet with no AE with Mod I.    Commode Transfer:   Short Term Goal: Pt will perform BSC t/f with Mod I.  Long Term Goal:  Pt will perform toilet t/f in bathroom with Mod I.     Bathing Status:   Long Term Goal: Pt will perform sponge bath with Mod I with no unsafe fatigue.     Strength Status: 5/5 BUEs  Long Term Goal: Pt to perform BUE strengthening with weights and/or body weight to increase ADL independence and safety    Endurance Status:   Short Term Goal:pt to perform 15 min OT treatment with 5 or greater rest breaks  Long Term Goal: pt to perform 30 min OT treat with 3 or less rest breaks                          Time Tracking:     OT Date of Treatment: 01/31/24  OT Start Time: 0900  OT Stop Time: 0930  OT Total Time (min): 30 min    Billable Minutes:Therapeutic Activity 15  Therapeutic Exercise 15               1/31/2024

## 2024-01-31 NOTE — PLAN OF CARE
Problem: Adult Inpatient Plan of Care  Goal: Plan of Care Review  Outcome: Ongoing, Progressing  Goal: Patient-Specific Goal (Individualized)  Outcome: Ongoing, Progressing  Goal: Absence of Hospital-Acquired Illness or Injury  Outcome: Ongoing, Progressing  Goal: Optimal Comfort and Wellbeing  Outcome: Ongoing, Progressing  Goal: Readiness for Transition of Care  Outcome: Ongoing, Progressing  Intervention: Mutually Develop Transition Plan  Flowsheets (Taken 1/31/2024 1122)  Transportation Anticipated: family or friend will provide     Problem: Fall Injury Risk  Goal: Absence of Fall and Fall-Related Injury  Outcome: Ongoing, Progressing  Intervention: Identify and Manage Contributors  Flowsheets (Taken 1/31/2024 1122)  Self-Care Promotion: independence encouraged  Medication Review/Management: medications reviewed     Problem: Breathing Pattern Ineffective  Goal: Effective Breathing Pattern  Outcome: Ongoing, Progressing  Intervention: Promote Improved Breathing Pattern  Flowsheets (Taken 1/31/2024 1122)  Airway/Ventilation Management: airway patency maintained     Problem: Gas Exchange Impaired  Goal: Optimal Gas Exchange  Outcome: Ongoing, Progressing  Intervention: Optimize Oxygenation and Ventilation  Flowsheets (Taken 1/31/2024 1122)  Airway/Ventilation Management: airway patency maintained

## 2024-02-01 PROBLEM — I34.0 NONRHEUMATIC MITRAL VALVE REGURGITATION: Status: ACTIVE | Noted: 2024-02-01

## 2024-02-01 LAB
ANION GAP SERPL CALCULATED.3IONS-SCNC: 6 MMOL/L (ref 7–16)
BASOPHILS # BLD AUTO: 0.08 K/UL (ref 0–0.2)
BASOPHILS NFR BLD AUTO: 1.1 % (ref 0–1)
BUN SERPL-MCNC: 23 MG/DL (ref 7–18)
BUN/CREAT SERPL: 26 (ref 6–20)
CALCIUM SERPL-MCNC: 9 MG/DL (ref 8.5–10.1)
CHLORIDE SERPL-SCNC: 98 MMOL/L (ref 98–107)
CO2 SERPL-SCNC: 34 MMOL/L (ref 21–32)
CREAT SERPL-MCNC: 0.9 MG/DL (ref 0.55–1.02)
DIFFERENTIAL METHOD BLD: ABNORMAL
EGFR (NO RACE VARIABLE) (RUSH/TITUS): 60 ML/MIN/1.73M2
EOSINOPHIL # BLD AUTO: 0.18 K/UL (ref 0–0.5)
EOSINOPHIL NFR BLD AUTO: 2.5 % (ref 1–4)
ERYTHROCYTE [DISTWIDTH] IN BLOOD BY AUTOMATED COUNT: 12.9 % (ref 11.5–14.5)
GLUCOSE SERPL-MCNC: 109 MG/DL (ref 74–106)
HCT VFR BLD AUTO: 42.4 % (ref 38–47)
HGB BLD-MCNC: 13.2 G/DL (ref 12–16)
LYMPHOCYTES # BLD AUTO: 1.43 K/UL (ref 1–4.8)
LYMPHOCYTES NFR BLD AUTO: 20 % (ref 27–41)
MAGNESIUM SERPL-MCNC: 2.4 MG/DL (ref 1.7–2.3)
MCH RBC QN AUTO: 30.1 PG (ref 27–31)
MCHC RBC AUTO-ENTMCNC: 31.1 G/DL (ref 32–36)
MCV RBC AUTO: 96.8 FL (ref 80–96)
MONOCYTES # BLD AUTO: 0.46 K/UL (ref 0–0.8)
MONOCYTES NFR BLD AUTO: 6.4 % (ref 2–6)
MPC BLD CALC-MCNC: 10 FL (ref 9.4–12.4)
NEUTROPHILS # BLD AUTO: 5 K/UL (ref 1.8–7.7)
NEUTROPHILS NFR BLD AUTO: 70 % (ref 53–65)
PHOSPHATE SERPL-MCNC: 4 MG/DL (ref 2.5–4.5)
PLATELET # BLD AUTO: 248 K/UL (ref 150–400)
POTASSIUM SERPL-SCNC: 3.9 MMOL/L (ref 3.5–5.1)
RBC # BLD AUTO: 4.38 M/UL (ref 4.2–5.4)
SODIUM SERPL-SCNC: 134 MMOL/L (ref 136–145)
WBC # BLD AUTO: 7.15 K/UL (ref 4.5–11)

## 2024-02-01 PROCEDURE — 94761 N-INVAS EAR/PLS OXIMETRY MLT: CPT

## 2024-02-01 PROCEDURE — 97110 THERAPEUTIC EXERCISES: CPT

## 2024-02-01 PROCEDURE — 27000982 HC MATTRESS, MATRIX LAL RENTAL

## 2024-02-01 PROCEDURE — 27000944

## 2024-02-01 PROCEDURE — 99900035 HC TECH TIME PER 15 MIN (STAT)

## 2024-02-01 PROCEDURE — 84100 ASSAY OF PHOSPHORUS: CPT | Performed by: NURSE PRACTITIONER

## 2024-02-01 PROCEDURE — 25000003 PHARM REV CODE 250: Performed by: NURSE PRACTITIONER

## 2024-02-01 PROCEDURE — 80048 BASIC METABOLIC PNL TOTAL CA: CPT | Performed by: NURSE PRACTITIONER

## 2024-02-01 PROCEDURE — 85025 COMPLETE CBC W/AUTO DIFF WBC: CPT | Performed by: NURSE PRACTITIONER

## 2024-02-01 PROCEDURE — 83735 ASSAY OF MAGNESIUM: CPT | Performed by: NURSE PRACTITIONER

## 2024-02-01 PROCEDURE — 97530 THERAPEUTIC ACTIVITIES: CPT

## 2024-02-01 PROCEDURE — 27000221 HC OXYGEN, UP TO 24 HOURS

## 2024-02-01 PROCEDURE — 97535 SELF CARE MNGMENT TRAINING: CPT

## 2024-02-01 PROCEDURE — 97116 GAIT TRAINING THERAPY: CPT

## 2024-02-01 PROCEDURE — 11000004 HC SNF PRIVATE

## 2024-02-01 RX ADMIN — SENNOSIDES AND DOCUSATE SODIUM 1 TABLET: 8.6; 5 TABLET ORAL at 08:02

## 2024-02-01 RX ADMIN — AMIODARONE HYDROCHLORIDE 200 MG: 200 TABLET ORAL at 08:02

## 2024-02-01 RX ADMIN — LOSARTAN POTASSIUM 50 MG: 50 TABLET, FILM COATED ORAL at 08:02

## 2024-02-01 RX ADMIN — SENNOSIDES AND DOCUSATE SODIUM 1 TABLET: 8.6; 5 TABLET ORAL at 09:02

## 2024-02-01 RX ADMIN — METOPROLOL SUCCINATE 100 MG: 50 TABLET, FILM COATED, EXTENDED RELEASE ORAL at 08:02

## 2024-02-01 RX ADMIN — APIXABAN 5 MG: 2.5 TABLET, FILM COATED ORAL at 09:02

## 2024-02-01 RX ADMIN — APIXABAN 5 MG: 2.5 TABLET, FILM COATED ORAL at 08:02

## 2024-02-01 RX ADMIN — LEVOTHYROXINE SODIUM 25 MCG: 0.03 TABLET ORAL at 06:02

## 2024-02-01 RX ADMIN — ASPIRIN 81 MG: 81 TABLET, COATED ORAL at 08:02

## 2024-02-01 RX ADMIN — ATORVASTATIN CALCIUM 20 MG: 10 TABLET, FILM COATED ORAL at 09:02

## 2024-02-01 RX ADMIN — FUROSEMIDE 40 MG: 40 TABLET ORAL at 08:02

## 2024-02-01 NOTE — PLAN OF CARE
Problem: Adult Inpatient Plan of Care  Goal: Absence of Hospital-Acquired Illness or Injury  Outcome: Ongoing, Progressing     Problem: Breathing Pattern Ineffective  Goal: Effective Breathing Pattern  Outcome: Ongoing, Progressing     Problem: Gas Exchange Impaired  Goal: Optimal Gas Exchange  Outcome: Ongoing, Progressing

## 2024-02-01 NOTE — PLAN OF CARE
Problem: Adult Inpatient Plan of Care  Goal: Plan of Care Review  Outcome: Ongoing, Progressing  Goal: Patient-Specific Goal (Individualized)  Outcome: Ongoing, Progressing  Goal: Absence of Hospital-Acquired Illness or Injury  Outcome: Ongoing, Progressing  Goal: Optimal Comfort and Wellbeing  Outcome: Ongoing, Progressing  Goal: Readiness for Transition of Care  Outcome: Ongoing, Progressing  Intervention: Mutually Develop Transition Plan  Flowsheets (Taken 2/1/2024 1102)  Transportation Anticipated: family or friend will provide     Problem: Fall Injury Risk  Goal: Absence of Fall and Fall-Related Injury  Outcome: Ongoing, Progressing  Intervention: Identify and Manage Contributors  Flowsheets (Taken 2/1/2024 1102)  Self-Care Promotion: independence encouraged  Medication Review/Management: medications reviewed     Problem: Breathing Pattern Ineffective  Goal: Effective Breathing Pattern  Outcome: Ongoing, Progressing  Intervention: Promote Improved Breathing Pattern  Flowsheets (Taken 2/1/2024 1102)  Airway/Ventilation Management: airway patency maintained  Supportive Measures: relaxation techniques promoted     Problem: Gas Exchange Impaired  Goal: Optimal Gas Exchange  Outcome: Ongoing, Progressing  Intervention: Optimize Oxygenation and Ventilation  Flowsheets (Taken 2/1/2024 1102)  Airway/Ventilation Management: airway patency maintained

## 2024-02-01 NOTE — PT/OT/SLP PROGRESS
Physical Therapy Treatment    Patient Name:  Windy Swann   MRN:  [id number]    Recommendations:     Discharge Recommendations: Low Intensity Therapy  Discharge Equipment Recommendations: 3-in-1 commode  Barriers to discharge: Decreased caregiver support    Assessment:     Windy Swann is a 89 y.o. female admitted with a medical diagnosis of Generalized weakness.  She presents with the following impairments/functional limitations: weakness, impaired endurance, impaired self care skills, impaired functional mobility, gait instability, impaired balance, decreased safety awareness, decreased lower extremity function. Patient with increased gait distance with no sitting (only standing) rest breaks required this treatment. Patient continues to demonstrate a heavy left lateral lean with gait with consistent unsafe use of rolling walker (tilting to the left; hitting wheels/posts with lower extremities). Patient will benefit from 24/7 care on discharge to ensure safety and minimize fall risk.     Rehab Prognosis: Good; patient would benefit from acute skilled PT services to address these deficits and reach maximum level of function.    Recent Surgery: * No surgery found *      Plan:     During this hospitalization, patient to be seen 5 x/week to address the identified rehab impairments via gait training, therapeutic activities, therapeutic exercises, neuromuscular re-education and progress toward the following goals:    Plan of Care Expires:  02/14/24    Subjective     Chief Complaint: no complaints this afternoon  Patient/Family Comments/goals: Patient plans to discharge to [facility]  Pain/Comfort:  Pain Rating 1: 0/10  Pain Rating Post-Intervention 1: 0/10    Objective:     Communicated with Lacy Young OT prior to session. Patient found up in chair with oxygen upon PT entry to room.     General Precautions: Standard, fall  Orthopedic Precautions: N/A  Braces: N/A  Respiratory Status: Nasal cannula,  flow 1.5 L/min     Functional Mobility:  Transfers:     Sit to Stand:  contact guard assistance with rolling walker  Chair to mat: contact guard assistance with  rolling walker  using  Step Transfer  Gait: x 212 feet with rolling walker with contact guard assist vs minimal assist; intermittent standing rest breaks required throughout gait cycle due to mild dyspnea on exertion and multiple losses of balance (left lateral trunk lean); verbal/tactile cues required for proper use of rolling walker due to tendency to push it too far to the right and walk too closely to the front rail     Treatment & Education:    Bed mobility, transfers, and gait performed as listed above.    NuStep: 2 minutes; 3 minutes    Patient left up in chair with call button in reach.    GOALS:   Multidisciplinary Problems       Physical Therapy Goals          Problem: Physical Therapy    Goal Priority Disciplines Outcome Goal Variances Interventions   Physical Therapy Goal     PT, PT/OT                        Time Tracking:     PT Received On: 02/01/24  PT Start Time: 1445     PT Stop Time: 1520  PT Total Time (min): 35 min     Billable Minutes: Gait Training 15 minutes and Therapeutic Activity 20 minutes    Treatment Type: Treatment, 6th Visit  PT/PTA: PT     Number of PTA visits since last PT visit: 0     02/01/2024

## 2024-02-02 VITALS
BODY MASS INDEX: 24.24 KG/M2 | TEMPERATURE: 98 F | SYSTOLIC BLOOD PRESSURE: 117 MMHG | HEIGHT: 66 IN | OXYGEN SATURATION: 97 % | WEIGHT: 150.81 LBS | RESPIRATION RATE: 18 BRPM | HEART RATE: 92 BPM | DIASTOLIC BLOOD PRESSURE: 85 MMHG

## 2024-02-02 PROCEDURE — 99900035 HC TECH TIME PER 15 MIN (STAT)

## 2024-02-02 PROCEDURE — 27000982 HC MATTRESS, MATRIX LAL RENTAL

## 2024-02-02 PROCEDURE — 86580 TB INTRADERMAL TEST: CPT | Performed by: NURSE PRACTITIONER

## 2024-02-02 PROCEDURE — 97530 THERAPEUTIC ACTIVITIES: CPT

## 2024-02-02 PROCEDURE — 27000944

## 2024-02-02 PROCEDURE — 25000003 PHARM REV CODE 250: Performed by: NURSE PRACTITIONER

## 2024-02-02 PROCEDURE — 97110 THERAPEUTIC EXERCISES: CPT

## 2024-02-02 PROCEDURE — 99316 NF DSCHRG MGMT 30 MIN+: CPT | Mod: ,,, | Performed by: FAMILY MEDICINE

## 2024-02-02 PROCEDURE — 97535 SELF CARE MNGMENT TRAINING: CPT

## 2024-02-02 PROCEDURE — 97110 THERAPEUTIC EXERCISES: CPT | Mod: CQ

## 2024-02-02 PROCEDURE — 97530 THERAPEUTIC ACTIVITIES: CPT | Mod: CQ

## 2024-02-02 PROCEDURE — 30200315 PPD INTRADERMAL TEST REV CODE 302: Performed by: NURSE PRACTITIONER

## 2024-02-02 PROCEDURE — 94761 N-INVAS EAR/PLS OXIMETRY MLT: CPT

## 2024-02-02 PROCEDURE — 27000221 HC OXYGEN, UP TO 24 HOURS

## 2024-02-02 RX ORDER — TALC
6 POWDER (GRAM) TOPICAL NIGHTLY PRN
Refills: 0
Start: 2024-02-02

## 2024-02-02 RX ORDER — CALCIUM CARBONATE 200(500)MG
500 TABLET,CHEWABLE ORAL 2 TIMES DAILY PRN
Start: 2024-02-02 | End: 2025-02-01

## 2024-02-02 RX ORDER — ACETAMINOPHEN 325 MG/1
650 TABLET ORAL EVERY 6 HOURS PRN
Refills: 0
Start: 2024-02-02

## 2024-02-02 RX ADMIN — APIXABAN 5 MG: 2.5 TABLET, FILM COATED ORAL at 11:02

## 2024-02-02 RX ADMIN — SENNOSIDES AND DOCUSATE SODIUM 1 TABLET: 8.6; 5 TABLET ORAL at 11:02

## 2024-02-02 RX ADMIN — METOPROLOL SUCCINATE 100 MG: 50 TABLET, FILM COATED, EXTENDED RELEASE ORAL at 11:02

## 2024-02-02 RX ADMIN — TUBERCULIN PURIFIED PROTEIN DERIVATIVE 5 UNITS: 5 INJECTION, SOLUTION INTRADERMAL at 01:02

## 2024-02-02 RX ADMIN — AMIODARONE HYDROCHLORIDE 200 MG: 200 TABLET ORAL at 11:02

## 2024-02-02 RX ADMIN — LOSARTAN POTASSIUM 50 MG: 50 TABLET, FILM COATED ORAL at 11:02

## 2024-02-02 RX ADMIN — FUROSEMIDE 40 MG: 40 TABLET ORAL at 11:02

## 2024-02-02 RX ADMIN — LEVOTHYROXINE SODIUM 25 MCG: 0.03 TABLET ORAL at 05:02

## 2024-02-02 RX ADMIN — ASPIRIN 81 MG: 81 TABLET, COATED ORAL at 11:02

## 2024-02-02 NOTE — PLAN OF CARE
Problem: Fall Injury Risk  Goal: Absence of Fall and Fall-Related Injury  Outcome: Ongoing, Progressing  Intervention: Promote Injury-Free Environment  Flowsheets (Taken 2/2/2024 1122)  Safety Promotion/Fall Prevention:   assistive device/personal item within reach   chair alarm set   nonskid shoes/socks when out of bed   room near unit station   in recliner, wheels locked   instructed to call staff for mobility   Fall Risk signage in place

## 2024-02-02 NOTE — PLAN OF CARE
[facility] - Medical Surgical Unit  Discharge Final Note    Primary Care Provider: Alyssa Primary Doctor    Expected Discharge Date: 2/2/2024    Final Discharge Note (most recent)       Final Note - 02/02/24 1251          Final Note    Assessment Type Final Discharge Note (P)      Anticipated Discharge Disposition Skilled Nursing Facility (P)      What phone number can be called within the next 1-3 days to see how you are doing after discharge? [phone] (P)      Hospital Resources/Appts/Education Provided Provided patient/caregiver with written discharge plan information;Provided education on problems/symptoms using teachback;Appointments scheduled and added to AVS (P)         Post-Acute Status    Post-Acute Authorization Placement (P)      Post-Acute Placement Status Set-up Complete/Auth obtained (P)      Coverage Medicare A & B (P)      Patient choice form signed by patient/caregiver List with quality metrics by geographic area provided;List from CMS Compare (P)      Discharge Delays None known at this time (P)                      Important Message from Medicare             Contact Info       No, Primary Doctor   Relationship: PCP - General        Next Steps: Follow up    Keesha Perkins II, MD   Specialty: Family Medicine    [address] [facility] [place] [address]   Phone: [phone]       Next Steps: Follow up in 3 day(s)

## 2024-02-02 NOTE — PT/OT/SLP PROGRESS
Physical Therapy Treatment    Patient Name:  Windy Swann   MRN:  [id number]    Recommendations:     Discharge Recommendations: Low Intensity Therapy  Discharge Equipment Recommendations: 3-in-1 commode  Barriers to discharge: Decreased caregiver support    Assessment:     Windy Swann is a 89 y.o. female admitted with a medical diagnosis of Generalized weakness.  She presents with the following impairments/functional limitations: weakness, impaired endurance, impaired self care skills, impaired functional mobility, gait instability, impaired balance, decreased safety awareness, decreased lower extremity function Patient was willing to participate with therapy today.     Rehab Prognosis: Good; patient would benefit from acute skilled PT services to address these deficits and reach maximum level of function.    Recent Surgery: * No surgery found *      Plan:     During this hospitalization, patient to be seen 5 x/week to address the identified rehab impairments via gait training, therapeutic activities, therapeutic exercises, neuromuscular re-education and progress toward the following goals:    Plan of Care Expires:  02/14/24    Subjective     Chief Complaint: weakness  Patient/Family Comments/goals: return home  Pain/Comfort:  Pain Rating 1: 0/10  Pain Rating Post-Intervention 1: 0/10      Objective:     Communicated with OT prior to session.  Patient found HOB elevated with oxygen upon PT entry to room.     General Precautions: Standard, fall  Orthopedic Precautions: N/A  Braces: N/A  Respiratory Status: Nasal cannula, flow 2 L/min     Functional Mobility:  Bed Mobility:     Supine to Sit: stand by assistance  Transfers:     Sit to Stand:  contact guard assistance with rolling walker  Bed to Chair: contact guard assistance with  rolling walker  using  Step Transfer  Toilet Transfer: contact guard assistance with  rolling walker  using  Step Transfer  Gait: Patient ambulated 20' within room while getting ready  for therapy. Patient ambulated with RW and CGA.      AM-PAC 6 CLICK MOBILITY  Turning over in bed (including adjusting bedclothes, sheets and blankets)?: 3  Sitting down on and standing up from a chair with arms (e.g., wheelchair, bedside commode, etc.): 3  Moving from lying on back to sitting on the side of the bed?: 3  Moving to and from a bed to a chair (including a wheelchair)?: 3  Need to walk in hospital room?: 3  Climbing 3-5 steps with a railing?: 2  Basic Mobility Total Score: 17       Treatment & Education:  Transfers and ambulation as listed above.   Standing balance activities   Sit to stands - 5 reps     Patient left up in chair with  OT present..    GOALS:   Multidisciplinary Problems       Physical Therapy Goals          Problem: Physical Therapy    Goal Priority Disciplines Outcome Goal Variances Interventions   Physical Therapy Goal     PT, PT/OT                          Time Tracking:     PT Received On: 02/02/24  PT Start Time: 0905     PT Stop Time: 0948  PT Total Time (min): 43 min     Billable Minutes: Therapeutic Activity 23 and Therapeutic Exercise 20    Treatment Type: Treatment  PT/PTA: PTA     Number of PTA visits since last PT visit: 1   HARIS Calhoun  02/02/2024

## 2024-02-02 NOTE — DISCHARGE SUMMARY
[facility] - Medical Surgical Unit  Hospital Medicine  Discharge Summary      Patient Name: Windy Swann  MRN: [id number]  NATALY: [id number]  Patient Class: IP- Swing  Admission Date: 1/23/2024  Hospital Length of Stay: 10 days  Discharge Date and Time:  02/02/2024 12:52 PM  Attending Physician: Poli Hines IV, DO   Discharging Provider: ALVARO Roca  Primary Care Provider: Alyssa Primary Doctor    Primary Care Team: Networked reference to record PCT     HPI:   89 year old female with an extensive PMH presents to the hospital with shortness of breath and chest pain.  She was found to be in atrial fibrillation on admission and placed on an Amiodarone drip. She was placed in the ICU on a NTG drip for her HTN.  She was also placed on Bipap.  She was diuresed.  The patient told Cardiology she did not want invasive/extensive testing for her medical conditions.   She has new onset afib, systolic CHF, and severe MR.  She has been optimized medically.. Would benefit from swing bed services to maximize her function.  She lives alone in [place]. She is being transferred to [place] for swing. At the end of that stay would likely benefit from transition to home hospice. Other option would be home health.          Above info is from ID summary [facility] 01/14/24-01/24-01/23/24.  Mrs. Swann was admitted to swing bed here at [facility] around 7 pm last night. On my assessment this morning. She is awake and alert, very talkative. Denies pain and SOB. Has o2 per nc at 2 liters. States she lives alone, uses a walker for ambulation. States she plans to dc home alone.      * No surgery found *      Hospital Course:   01/25/24 Awake and resting in bed. Complains of having nasal congestion earlier this morning. O2 sat on 2 liters is 96%. Continue therapy for strengthening.  01/25/24 notified that pt had assisted fall while in bathroom. PTA states she assisted her to floor . Pt.blames fall on  nonskid socks. Mrs. Swann denies injuring herself. Did not hit head. No apparent injury noted.  01/26/24 Awake and sitting up in reclining chair. Complains of having anxiety if her door to her room is shut. Note is on door asking for it to be left open. Denies pain this morning. Sat is 97%.  01/29/24 Awake and resting in bed without complaints. Today is her 89st birthday . Wished her a happy birthday.  Continue therapy for strengthening.  01/30/24 Awake and resting in bed. No complaints. She walked a total of 90 ft with therapist yesterday. Talked with her re plans at [facility] - states she knows she will have to dc to nursing home if she is unable to dc home.  02/02/24 Mrs. Swann has been accepted to [facility]. She will discharge there today. 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 Medications reconciled.      Goals of Care Treatment Preferences:  Code Status: DNR      Consults:   Consults (From admission, onward)          Status Ordering Provider     Inpatient consult to Social Work  Once        Provider:  (Not yet assigned)    Acknowledged ZEINAB GREEN     Inpatient consult to Registered Dietitian/Nutritionist  Once        Provider:  (Not yet assigned)    Completed ZEINAB GREEN            Cardiac/Vascular  Severe mitral regurgitation  Has refused invasive testing, followed by cardiology. Continue current meds. Hospice appropriate.      02/02/24 to [facility] [place] 2[facility] with home health to follow - does not want hospice at this time    Systolic congestive heart failure  Patient is identified as having Systolic (HFrEF) heart failure that is Chronic. CHF is currently controlled. Latest ECHO performed and demonstrates- Results for orders placed during the hospital encounter of 01/14/24    Echo    Interpretation Summary    Left Ventricle: The left ventricle is normal in size. Normal wall thickness. There is eccentric hypertrophy. Regional wall motion abnormalities present.  "Anterior and amy-septal wall appears akinetic There is severely reduced systolic function with a visually estimated ejection fraction of 20 - 25%. Biplane (2D) method of discs ejection fraction is 24%. Unable to assess diastolic function due to atrial fibrillation.    Right Ventricle: Normal right ventricular cavity size. Systolic function is normal.    Left Atrium: Left atrium is severely dilated.    Right Atrium: Right atrium is severely dilated.    Aortic Valve: The aortic valve is a trileaflet valve.    Mitral Valve: There is posterior leaflet sclerosis. There is annular dilation present. There is severe regurgitation.    Tricuspid Valve: There is severe regurgitation.    Pulmonic Valve: There is mild regurgitation.    Pulmonary Artery: The estimated pulmonary artery systolic pressure is 51 mmHg.    IVC/SVC: Elevated venous pressure at 15 mmHg.    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 Continue Beta Blocker, ACE/ARB, and Furosemide and monitor clinical status closely. Monitor on telemetry. Patient is on CHF pathway.  Monitor strict Is&Os and daily weights. Continue to stress to patient importance of self efficacy and  on diet for CHF. Last BNP reviewed- and noted below No results for input(s): "BNP", "BNPTRIAGEBLO" in the last 168 hours.    02/02/24 dc to [facility] with home health to follow with OT and PT to follow   States she does not want hospice      Endocrine  Hypothyroidism  01/24/24 continue levothyroxine 25 mcg po daily    01/29/24 continue levothyroxine     Other  * Generalized weakness  01/24/24 PT and OT to evaluate and treat       01/25/24 walked 12 feet with therapist yesterday    01/29/24 continue therapy    01/30/24 continue therapy    02/02/24 progressed with therapy - walked 220 feet with therapy- required rest breaks due to dyspnea      Final Active Diagnoses:    Diagnosis Date Noted POA    PRINCIPAL " PROBLEM:  Generalized weakness [R53.1] 01/23/2024 Yes    Atrial fibrillation with rapid ventricular response [I48.91] 01/14/2024 Yes    Systolic congestive heart failure [I50.20] 01/23/2024 Yes    Severe mitral regurgitation [I34.0] 01/16/2024 Yes    Hypothyroidism [E03.9] 01/20/2024 Yes      Problems Resolved During this Admission:       Discharged Condition: stable    Disposition: Skilled Nursing Facility    Follow Up:   Follow-up Information       No, Primary Doctor Follow up.               Keesha Perkins II, MD Follow up in 3 day(s).    Specialty: Family Medicine  Contact information:  [address] [facility] [address]  [phone]                           Patient Instructions:      Diet Cardiac   Order Comments: Chopped meats       Significant Diagnostic Studies: Labs: All labs within the past 24 hours have been reviewed    Pending Diagnostic Studies:       None           Medications:  Reconciled Home Medications:      Medication List        START taking these medications      acetaminophen 325 MG tablet  Commonly known as: TYLENOL  Take 2 tablets (650 mg total) by mouth every 6 (six) hours as needed for Temperature greater than (100.4 F).     calcium carbonate 200 mg calcium (500 mg) chewable tablet  Commonly known as: TUMS  Take 1 tablet (500 mg total) by mouth 2 (two) times daily as needed for Heartburn.     melatonin 3 mg tablet  Commonly known as: MELATIN  Take 2 tablets (6 mg total) by mouth nightly as needed for Insomnia.            CHANGE how you take these medications      amiodarone 200 MG Tab  Commonly known as: PACERONE  Take 1 tablet (200 mg total) by mouth once daily.  What changed: Another medication with the same name was removed. Continue taking this medication, and follow the directions you see here.            CONTINUE taking these medications      albuterol-ipratropium 2.5 mg-0.5 mg/3 mL nebulizer solution  Commonly known as: DUO-NEB  Take 3 mLs by  nebulization every 6 (six) hours as needed for Wheezing. Rescue     apixaban 5 mg Tab  Commonly known as: ELIQUIS  Take 1 tablet (5 mg total) by mouth 2 (two) times daily.     aspirin 81 MG EC tablet  Commonly known as: ECOTRIN  Take 1 tablet (81 mg total) by mouth once daily.     atorvastatin 20 MG tablet  Commonly known as: LIPITOR  Take 1 tablet (20 mg total) by mouth every evening.     docusate sodium 100 MG capsule  Commonly known as: COLACE  Take 1 capsule (100 mg total) by mouth daily as needed for Constipation.     furosemide 40 MG tablet  Commonly known as: LASIX  Take 1 tablet (40 mg total) by mouth once daily.     levothyroxine 25 MCG tablet  Commonly known as: SYNTHROID  Take 1 tablet (25 mcg total) by mouth before breakfast.     losartan 50 MG tablet  Commonly known as: COZAAR  Take 1 tablet (50 mg total) by mouth once daily.     metoprolol succinate 100 MG 24 hr tablet  Commonly known as: TOPROL-XL  Take 1 tablet (100 mg total) by mouth once daily.     traZODone 50 MG tablet  Commonly known as: DESYREL  Take 0.5 tablets (25 mg total) by mouth nightly as needed for Insomnia.              Indwelling Lines/Drains at time of discharge:   Lines/Drains/Airways       None                   Time spent on the discharge of patient: 35 minutes         Poli Hines IV, DO  Department of Hospital Medicine  [facility] - Medical Surgical Unit

## 2024-02-02 NOTE — ASSESSMENT & PLAN NOTE
01/24/24 PT and OT to evaluate and treat       01/25/24 walked 12 feet with therapist yesterday    01/29/24 continue therapy    01/30/24 continue therapy    02/02/24 progressed with therapy - walked 220 feet with therapy- required rest breaks due to dyspnea

## 2024-02-02 NOTE — PT/OT/SLP PROGRESS
Occupational Therapy   Treatment    Name: Windy Swann  MRN: [id number]  Admitting Diagnosis:  Generalized weakness       Recommendations:     Discharge Recommendations: Moderate Intensity Therapy  Discharge Equipment Recommendations:  3-in-1 commode  Barriers to discharge:  None    Assessment:     Windy Swann is a 89 y.o. female with a medical diagnosis of Generalized weakness.  She presents with weakness and decline with ADLs. Performance deficits affecting function are weakness, impaired endurance, impaired self care skills, impaired functional mobility, gait instability, impaired balance, decreased safety awareness, decreased lower extremity function, decreased upper extremity function.     Rehab Prognosis:  Good; patient would benefit from acute skilled OT services to address these deficits and reach maximum level of function.       Plan:     Patient to be seen 5 x/week to address the above listed problems via self-care/home management, therapeutic activities, therapeutic exercises  Plan of Care Expires: 02/07/24  Plan of Care Reviewed with: patient    Subjective     Chief Complaint: no complaints  Patient/Family Comments/goals: to return to prior level of function  Pain/Comfort:       Objective:     Communicated with: Nurse prior to session.  Patient found supine with bed alarm, oxygen upon OT entry to room.    General Precautions: Standard, fall    Orthopedic Precautions:N/A  Braces: N/A  Respiratory Status: Room air     Occupational Performance:     Bed Mobility:    Patient completed Rolling/Turning to Left with  independence  Patient completed Rolling/Turning to Right with independence  Patient completed Scooting/Bridging with independence  Patient completed Supine to Sit with independence     Functional Mobility/Transfers:  Patient completed Sit <> Stand Transfer with independence  with  rolling walker   Patient completed Bed <> Chair Transfer using Step Transfer technique with independence with rolling  walker  Functional Mobility: Patient transferred within room with minimal difficulty    Activities of Daily Living:  Grooming: contact guard assistance to stand and wash hands at sink  Upper Body Dressing: stand by assistance to ame robe  Lower Body Dressing: moderate assistance to ame socks  Toileting: stand by assistance to perform toilet hygiene      AMPAC 6 Click ADL:      Treatment & Education:  Pt performed B UE strengthening exercises to include:   UE bike x 6 min  Shoulder flexion   Chest press    Elbow flexion   Elbow extension   Pectoral stretches   Bilateral rowing  All exercises performed 3x10 reps using 2# dowel and red theraband.     Patient left HOB elevated with all lines intact and call button in reach    GOALS:   Multidisciplinary Problems       Occupational Therapy Goals          Problem: Occupational Therapy    Goal Priority Disciplines Outcome Interventions   Occupational Therapy Goal     OT, PT/OT Ongoing, Progressing    Description: Grooming Status:   Short Term Goal: Pt will perform grooming with SBA sitting EOB.   Long Term Goal: Pt will perform grooming/oral hygiene standing at sink with SBA      LE dressing Status:   Short Term Goal: Pt will perform LE dressing with SBA.   Long Term Goal: Pt will perform LE dressing with Mod I.    Toileting Status:   Short Term Goal: Pt will perform toilet hygiene on BSC with Mod I.  Long Term Goal: Pt will perform toilet hygiene on toilet with no AE with Mod I.    Commode Transfer:   Short Term Goal: Pt will perform BSC t/f with Mod I.  Long Term Goal:  Pt will perform toilet t/f in bathroom with Mod I.     Bathing Status:   Long Term Goal: Pt will perform sponge bath with Mod I with no unsafe fatigue.     Strength Status: 5/5 BUEs  Long Term Goal: Pt to perform BUE strengthening with weights and/or body weight to increase ADL independence and safety    Endurance Status:   Short Term Goal:pt to perform 15 min OT treatment with 5 or greater rest  breaks  Long Term Goal: pt to perform 30 min OT treat with 3 or less rest breaks                          Time Tracking:     OT Date of Treatment: 02/02/24  OT Start Time: 0948  OT Stop Time: 1050  OT Total Time (min): 62 min    Billable Minutes:Self Care/Home Management 15 min   Therapeutic Activity 15 min   Therapeutic Exercise 25 min    GILBERT Tatum/L, CSRS  OT/SOFYA: OT          2/2/2024

## 2024-02-02 NOTE — PLAN OF CARE
[facility] - Medical Surgical Unit  Discharge Final Note    Primary Care Provider: Alyssa Primary Doctor    Expected Discharge Date: 2/2/2024    Final Discharge Note (most recent)       Final Note - 02/02/24 1251          Final Note    Assessment Type Final Discharge Note (P)      Anticipated Discharge Disposition Skilled Nursing Facility (P)      What phone number can be called within the next 1-3 days to see how you are doing after discharge? [phone] (P)      Hospital Resources/Appts/Education Provided Provided patient/caregiver with written discharge plan information;Provided education on problems/symptoms using teachback;Appointments scheduled and added to AVS (P)         Post-Acute Status    Post-Acute Authorization Placement (P)      Post-Acute Placement Status Set-up Complete/Auth obtained (P)      Coverage Medicare A & B (P)      Patient choice form signed by patient/caregiver List with quality metrics by geographic area provided;List from CMS Compare (P)      Discharge Delays None known at this time (P)                      Important Message from Medicare             Contact Info       No, Primary Doctor   Relationship: PCP - General        Next Steps: Follow up    Keesha Perkins II, MD   Specialty: Family Medicine    [address] [facility] [place] [address]   Phone: [phone]       Next Steps: Follow up in 3 day(s)          Ms. Swann will discharge to [facility]

## 2024-02-02 NOTE — PLAN OF CARE
[facility] Surgical Unit  Discharge Assessment    Primary Care Provider: No, Primary Doctor     Discharge Assessment (most recent)       BRIEF DISCHARGE ASSESSMENT - 02/02/24 1103          Discharge Planning    Assessment Type Discharge Planning Brief Assessment (P)      Resource/Environmental Concerns none (P)      Support Systems Family members (P)      Patient/Family Anticipates Transition to long-term care facility (P)      Patient/Family Anticipated Services at Transition skilled nursing (P)      Discharge Plan A New Nursing Home placement - senior care care facility (P)                    Visited with Ms. Swann and Marie Zhong, Ms. Swann has agreed to have a referral sent  to [facility] for short term stay and hopefully be able to discharge home with hospice from there with a sitter for night time and family and friends to visit during the day.

## 2024-02-02 NOTE — ASSESSMENT & PLAN NOTE
"Patient is identified as having Systolic (HFrEF) heart failure that is Chronic. CHF is currently controlled. Latest ECHO performed and demonstrates- Results for orders placed during the hospital encounter of 01/14/24    Echo    Interpretation Summary    Left Ventricle: The left ventricle is normal in size. Normal wall thickness. There is eccentric hypertrophy. Regional wall motion abnormalities present. Anterior and amy-septal wall appears akinetic There is severely reduced systolic function with a visually estimated ejection fraction of 20 - 25%. Biplane (2D) method of discs ejection fraction is 24%. Unable to assess diastolic function due to atrial fibrillation.    Right Ventricle: Normal right ventricular cavity size. Systolic function is normal.    Left Atrium: Left atrium is severely dilated.    Right Atrium: Right atrium is severely dilated.    Aortic Valve: The aortic valve is a trileaflet valve.    Mitral Valve: There is posterior leaflet sclerosis. There is annular dilation present. There is severe regurgitation.    Tricuspid Valve: There is severe regurgitation.    Pulmonic Valve: There is mild regurgitation.    Pulmonary Artery: The estimated pulmonary artery systolic pressure is 51 mmHg.    IVC/SVC: Elevated venous pressure at 15 mmHg.    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 Continue Beta Blocker, ACE/ARB, and Furosemide and monitor clinical status closely. Monitor on telemetry. Patient is on CHF pathway.  Monitor strict Is&Os and daily weights. Continue to stress to patient importance of self efficacy and  on diet for CHF. Last BNP reviewed- and noted below No results for input(s): "BNP", "BNPTRIAGEBLO" in the last 168 hours.    02/02/24 dc to [facility] with home health to follow with OT and PT to follow   States she does not want hospice    "

## 2024-02-02 NOTE — ASSESSMENT & PLAN NOTE
Has refused invasive testing, followed by cardiology. Continue current meds. Hospice appropriate.      02/02/24 to [facility] with home health to follow - does not want hospice at this time

## 2024-03-02 ENCOUNTER — HOSPITAL ENCOUNTER (INPATIENT)
Facility: HOSPITAL | Age: 89
LOS: 5 days | Discharge: HOME | DRG: 291 | End: 2024-03-07
Attending: FAMILY MEDICINE | Admitting: FAMILY MEDICINE
Payer: MEDICARE

## 2024-03-02 DIAGNOSIS — J96.01 ACUTE RESPIRATORY FAILURE WITH HYPOXIA: ICD-10-CM

## 2024-03-02 DIAGNOSIS — R06.02 SHORTNESS OF BREATH: ICD-10-CM

## 2024-03-02 DIAGNOSIS — I48.91 ATRIAL FIBRILLATION WITH RVR: ICD-10-CM

## 2024-03-02 DIAGNOSIS — I50.23 ACUTE ON CHRONIC SYSTOLIC CHF (CONGESTIVE HEART FAILURE): Primary | ICD-10-CM

## 2024-03-02 LAB
ALBUMIN SERPL BCP-MCNC: 3.3 G/DL (ref 3.5–5)
ALBUMIN/GLOB SERPL: 0.7 {RATIO}
ALP SERPL-CCNC: 126 U/L (ref 55–142)
ALT SERPL W P-5'-P-CCNC: 63 U/L (ref 13–56)
ANION GAP SERPL CALCULATED.3IONS-SCNC: 7 MMOL/L (ref 7–16)
APTT PPP: 38.6 SECONDS (ref 25.2–37.3)
AST SERPL W P-5'-P-CCNC: 32 U/L (ref 15–37)
BACTERIA #/AREA URNS HPF: ABNORMAL /HPF
BASOPHILS # BLD AUTO: 0.07 K/UL (ref 0–0.2)
BASOPHILS NFR BLD AUTO: 0.8 % (ref 0–1)
BILIRUB SERPL-MCNC: 0.8 MG/DL (ref ?–1.2)
BILIRUB UR QL STRIP: NEGATIVE
BUN SERPL-MCNC: 26 MG/DL (ref 7–18)
BUN/CREAT SERPL: 20 (ref 6–20)
CALCIUM SERPL-MCNC: 9 MG/DL (ref 8.5–10.1)
CHLORIDE SERPL-SCNC: 99 MMOL/L (ref 98–107)
CLARITY UR: CLEAR
CO2 SERPL-SCNC: 36 MMOL/L (ref 21–32)
COLOR UR: YELLOW
CREAT SERPL-MCNC: 1.33 MG/DL (ref 0.55–1.02)
DIFFERENTIAL METHOD BLD: ABNORMAL
EGFR (NO RACE VARIABLE) (RUSH/TITUS): 38 ML/MIN/1.73M2
EOSINOPHIL # BLD AUTO: 0.07 K/UL (ref 0–0.5)
EOSINOPHIL NFR BLD AUTO: 0.8 % (ref 1–4)
ERYTHROCYTE [DISTWIDTH] IN BLOOD BY AUTOMATED COUNT: 13.8 % (ref 11.5–14.5)
GLOBULIN SER-MCNC: 4.6 G/DL (ref 2–4)
GLUCOSE SERPL-MCNC: 157 MG/DL (ref 74–106)
GLUCOSE UR STRIP-MCNC: NEGATIVE MG/DL
HCT VFR BLD AUTO: 38.6 % (ref 38–47)
HGB BLD-MCNC: 12.3 G/DL (ref 12–16)
INFLUENZA A MOLECULAR (OHS): NEGATIVE
INFLUENZA B MOLECULAR (OHS): NEGATIVE
INR BLD: 1.75
KETONES UR STRIP-SCNC: NEGATIVE MG/DL
LACTATE SERPL-SCNC: 1.3 MMOL/L (ref 0.4–2)
LEUKOCYTE ESTERASE UR QL STRIP: NEGATIVE
LYMPHOCYTES # BLD AUTO: 0.65 K/UL (ref 1–4.8)
LYMPHOCYTES NFR BLD AUTO: 7.6 % (ref 27–41)
MAGNESIUM SERPL-MCNC: 2.6 MG/DL (ref 1.7–2.3)
MCH RBC QN AUTO: 31.4 PG (ref 27–31)
MCHC RBC AUTO-ENTMCNC: 31.9 G/DL (ref 32–36)
MCV RBC AUTO: 98.5 FL (ref 80–96)
MONOCYTES # BLD AUTO: 0.43 K/UL (ref 0–0.8)
MONOCYTES NFR BLD AUTO: 5.1 % (ref 2–6)
MPC BLD CALC-MCNC: 9.8 FL (ref 9.4–12.4)
MUCOUS THREADS #/AREA URNS HPF: ABNORMAL /HPF
NEUTROPHILS # BLD AUTO: 7.29 K/UL (ref 1.8–7.7)
NEUTROPHILS NFR BLD AUTO: 85.7 % (ref 53–65)
NITRITE UR QL STRIP: NEGATIVE
NT-PROBNP SERPL-MCNC: 7385 PG/ML (ref 1–450)
PH UR STRIP: 6 PH UNITS
PLATELET # BLD AUTO: 277 K/UL (ref 150–400)
POTASSIUM SERPL-SCNC: 4.1 MMOL/L (ref 3.5–5.1)
PROT SERPL-MCNC: 7.9 G/DL (ref 6.4–8.2)
PROT UR QL STRIP: NEGATIVE
PROTHROMBIN TIME: 21.2 SECONDS (ref 11.7–14.7)
RBC # BLD AUTO: 3.92 M/UL (ref 4.2–5.4)
RBC # UR STRIP: ABNORMAL /UL
RBC #/AREA URNS HPF: ABNORMAL /HPF
SARS-COV-2 RDRP RESP QL NAA+PROBE: NEGATIVE
SODIUM SERPL-SCNC: 138 MMOL/L (ref 136–145)
SP GR UR STRIP: >=1.03
SQUAMOUS #/AREA URNS LPF: ABNORMAL /LPF
TROPONIN I SERPL DL<=0.01 NG/ML-MCNC: 23.5 PG/ML
TROPONIN I SERPL DL<=0.01 NG/ML-MCNC: 27.4 PG/ML
TROPONIN I SERPL DL<=0.01 NG/ML-MCNC: 30.8 PG/ML
UROBILINOGEN UR STRIP-ACNC: 0.2 MG/DL
WBC # BLD AUTO: 8.51 K/UL (ref 4.5–11)
WBC #/AREA URNS HPF: ABNORMAL /HPF

## 2024-03-02 PROCEDURE — 25000242 PHARM REV CODE 250 ALT 637 W/ HCPCS: Performed by: NURSE PRACTITIONER

## 2024-03-02 PROCEDURE — 27000221 HC OXYGEN, UP TO 24 HOURS

## 2024-03-02 PROCEDURE — 83605 ASSAY OF LACTIC ACID: CPT | Performed by: NURSE PRACTITIONER

## 2024-03-02 PROCEDURE — 93005 ELECTROCARDIOGRAM TRACING: CPT

## 2024-03-02 PROCEDURE — 94761 N-INVAS EAR/PLS OXIMETRY MLT: CPT

## 2024-03-02 PROCEDURE — 83735 ASSAY OF MAGNESIUM: CPT | Performed by: NURSE PRACTITIONER

## 2024-03-02 PROCEDURE — 11000001 HC ACUTE MED/SURG PRIVATE ROOM

## 2024-03-02 PROCEDURE — 25000003 PHARM REV CODE 250: Performed by: NURSE PRACTITIONER

## 2024-03-02 PROCEDURE — 87635 SARS-COV-2 COVID-19 AMP PRB: CPT | Performed by: NURSE PRACTITIONER

## 2024-03-02 PROCEDURE — 85610 PROTHROMBIN TIME: CPT | Performed by: NURSE PRACTITIONER

## 2024-03-02 PROCEDURE — 96375 TX/PRO/DX INJ NEW DRUG ADDON: CPT | Mod: 59

## 2024-03-02 PROCEDURE — 87040 BLOOD CULTURE FOR BACTERIA: CPT | Performed by: NURSE PRACTITIONER

## 2024-03-02 PROCEDURE — 94640 AIRWAY INHALATION TREATMENT: CPT

## 2024-03-02 PROCEDURE — 84484 ASSAY OF TROPONIN QUANT: CPT | Performed by: NURSE PRACTITIONER

## 2024-03-02 PROCEDURE — C9113 INJ PANTOPRAZOLE SODIUM, VIA: HCPCS | Performed by: NURSE PRACTITIONER

## 2024-03-02 PROCEDURE — 96374 THER/PROPH/DIAG INJ IV PUSH: CPT | Mod: 59

## 2024-03-02 PROCEDURE — 87502 INFLUENZA DNA AMP PROBE: CPT | Performed by: NURSE PRACTITIONER

## 2024-03-02 PROCEDURE — 80053 COMPREHEN METABOLIC PANEL: CPT | Performed by: NURSE PRACTITIONER

## 2024-03-02 PROCEDURE — 63600175 PHARM REV CODE 636 W HCPCS: Performed by: NURSE PRACTITIONER

## 2024-03-02 PROCEDURE — 96376 TX/PRO/DX INJ SAME DRUG ADON: CPT

## 2024-03-02 PROCEDURE — 51702 INSERT TEMP BLADDER CATH: CPT

## 2024-03-02 PROCEDURE — 99285 EMERGENCY DEPT VISIT HI MDM: CPT | Mod: GF | Performed by: NURSE PRACTITIONER

## 2024-03-02 PROCEDURE — 85025 COMPLETE CBC W/AUTO DIFF WBC: CPT | Performed by: NURSE PRACTITIONER

## 2024-03-02 PROCEDURE — 81003 URINALYSIS AUTO W/O SCOPE: CPT | Performed by: NURSE PRACTITIONER

## 2024-03-02 PROCEDURE — 83880 ASSAY OF NATRIURETIC PEPTIDE: CPT | Performed by: NURSE PRACTITIONER

## 2024-03-02 PROCEDURE — 99285 EMERGENCY DEPT VISIT HI MDM: CPT | Mod: 25

## 2024-03-02 PROCEDURE — 93010 ELECTROCARDIOGRAM REPORT: CPT | Performed by: HOSPITALIST

## 2024-03-02 PROCEDURE — 85730 THROMBOPLASTIN TIME PARTIAL: CPT | Performed by: NURSE PRACTITIONER

## 2024-03-02 RX ORDER — ONDANSETRON HYDROCHLORIDE 2 MG/ML
4 INJECTION, SOLUTION INTRAVENOUS EVERY 8 HOURS PRN
Status: DISCONTINUED | OUTPATIENT
Start: 2024-03-02 | End: 2024-03-07 | Stop reason: HOSPADM

## 2024-03-02 RX ORDER — FUROSEMIDE 10 MG/ML
40 INJECTION INTRAMUSCULAR; INTRAVENOUS
Status: COMPLETED | OUTPATIENT
Start: 2024-03-02 | End: 2024-03-02

## 2024-03-02 RX ORDER — ALBUTEROL SULFATE 0.83 MG/ML
2.5 SOLUTION RESPIRATORY (INHALATION)
Status: DISCONTINUED | OUTPATIENT
Start: 2024-03-02 | End: 2024-03-02

## 2024-03-02 RX ORDER — CALCIUM CARBONATE 200(500)MG
500 TABLET,CHEWABLE ORAL 2 TIMES DAILY PRN
Status: DISCONTINUED | OUTPATIENT
Start: 2024-03-03 | End: 2024-03-07 | Stop reason: HOSPADM

## 2024-03-02 RX ORDER — METOLAZONE 2.5 MG/1
5 TABLET ORAL DAILY
Status: DISCONTINUED | OUTPATIENT
Start: 2024-03-03 | End: 2024-03-02

## 2024-03-02 RX ORDER — METOPROLOL SUCCINATE 50 MG/1
100 TABLET, EXTENDED RELEASE ORAL DAILY
Status: DISCONTINUED | OUTPATIENT
Start: 2024-03-03 | End: 2024-03-07 | Stop reason: HOSPADM

## 2024-03-02 RX ORDER — AMOXICILLIN 250 MG
1 CAPSULE ORAL 2 TIMES DAILY
Status: DISCONTINUED | OUTPATIENT
Start: 2024-03-02 | End: 2024-03-07 | Stop reason: HOSPADM

## 2024-03-02 RX ORDER — AMIODARONE HYDROCHLORIDE 200 MG/1
200 TABLET ORAL DAILY
Status: DISCONTINUED | OUTPATIENT
Start: 2024-03-03 | End: 2024-03-07 | Stop reason: HOSPADM

## 2024-03-02 RX ORDER — LEVOTHYROXINE SODIUM 75 UG/1
75 TABLET ORAL
COMMUNITY
Start: 2024-02-24

## 2024-03-02 RX ORDER — MORPHINE SULFATE 4 MG/ML
2 INJECTION, SOLUTION INTRAMUSCULAR; INTRAVENOUS
Status: COMPLETED | OUTPATIENT
Start: 2024-03-02 | End: 2024-03-02

## 2024-03-02 RX ORDER — PANTOPRAZOLE SODIUM 40 MG/10ML
40 INJECTION, POWDER, LYOPHILIZED, FOR SOLUTION INTRAVENOUS
Status: COMPLETED | OUTPATIENT
Start: 2024-03-02 | End: 2024-03-02

## 2024-03-02 RX ORDER — ACETAMINOPHEN 325 MG/1
650 TABLET ORAL EVERY 6 HOURS PRN
Status: DISCONTINUED | OUTPATIENT
Start: 2024-03-02 | End: 2024-03-07 | Stop reason: HOSPADM

## 2024-03-02 RX ORDER — SODIUM CHLORIDE 0.9 % (FLUSH) 0.9 %
10 SYRINGE (ML) INJECTION
Status: DISCONTINUED | OUTPATIENT
Start: 2024-03-02 | End: 2024-03-07 | Stop reason: HOSPADM

## 2024-03-02 RX ORDER — FUROSEMIDE 10 MG/ML
40 INJECTION INTRAMUSCULAR; INTRAVENOUS EVERY 12 HOURS
Status: DISCONTINUED | OUTPATIENT
Start: 2024-03-02 | End: 2024-03-04

## 2024-03-02 RX ORDER — IPRATROPIUM BROMIDE AND ALBUTEROL SULFATE 2.5; .5 MG/3ML; MG/3ML
3 SOLUTION RESPIRATORY (INHALATION) EVERY 6 HOURS
Status: DISCONTINUED | OUTPATIENT
Start: 2024-03-03 | End: 2024-03-07 | Stop reason: HOSPADM

## 2024-03-02 RX ORDER — ENOXAPARIN SODIUM 100 MG/ML
40 INJECTION SUBCUTANEOUS EVERY 24 HOURS
Status: DISCONTINUED | OUTPATIENT
Start: 2024-03-02 | End: 2024-03-02

## 2024-03-02 RX ORDER — METOLAZONE 2.5 MG/1
5 TABLET ORAL
Status: COMPLETED | OUTPATIENT
Start: 2024-03-02 | End: 2024-03-02

## 2024-03-02 RX ORDER — LOSARTAN POTASSIUM 50 MG/1
50 TABLET ORAL DAILY
Status: DISCONTINUED | OUTPATIENT
Start: 2024-03-03 | End: 2024-03-07 | Stop reason: HOSPADM

## 2024-03-02 RX ORDER — ASPIRIN 81 MG/1
81 TABLET ORAL DAILY
Status: DISCONTINUED | OUTPATIENT
Start: 2024-03-03 | End: 2024-03-07 | Stop reason: HOSPADM

## 2024-03-02 RX ORDER — IPRATROPIUM BROMIDE AND ALBUTEROL SULFATE 2.5; .5 MG/3ML; MG/3ML
3 SOLUTION RESPIRATORY (INHALATION)
Status: COMPLETED | OUTPATIENT
Start: 2024-03-02 | End: 2024-03-02

## 2024-03-02 RX ORDER — ATORVASTATIN CALCIUM 10 MG/1
20 TABLET, FILM COATED ORAL NIGHTLY
Status: DISCONTINUED | OUTPATIENT
Start: 2024-03-03 | End: 2024-03-07 | Stop reason: HOSPADM

## 2024-03-02 RX ORDER — NAPROXEN SODIUM 220 MG/1
324 TABLET, FILM COATED ORAL
Status: COMPLETED | OUTPATIENT
Start: 2024-03-02 | End: 2024-03-02

## 2024-03-02 RX ORDER — MUPIROCIN 20 MG/G
OINTMENT TOPICAL 2 TIMES DAILY
Status: DISCONTINUED | OUTPATIENT
Start: 2024-03-03 | End: 2024-03-07 | Stop reason: HOSPADM

## 2024-03-02 RX ORDER — TALC
6 POWDER (GRAM) TOPICAL NIGHTLY PRN
Status: DISCONTINUED | OUTPATIENT
Start: 2024-03-02 | End: 2024-03-07 | Stop reason: HOSPADM

## 2024-03-02 RX ORDER — POTASSIUM CHLORIDE 20 MEQ/1
20 TABLET, EXTENDED RELEASE ORAL DAILY
Status: DISCONTINUED | OUTPATIENT
Start: 2024-03-03 | End: 2024-03-07 | Stop reason: HOSPADM

## 2024-03-02 RX ADMIN — IPRATROPIUM BROMIDE AND ALBUTEROL SULFATE 3 ML: .5; 3 SOLUTION RESPIRATORY (INHALATION) at 04:03

## 2024-03-02 RX ADMIN — METOLAZONE 5 MG: 2.5 TABLET ORAL at 08:03

## 2024-03-02 RX ADMIN — SENNOSIDES AND DOCUSATE SODIUM 1 TABLET: 8.6; 5 TABLET ORAL at 11:03

## 2024-03-02 RX ADMIN — MORPHINE SULFATE 2 MG: 4 INJECTION, SOLUTION INTRAMUSCULAR; INTRAVENOUS at 04:03

## 2024-03-02 RX ADMIN — AMIODARONE HYDROCHLORIDE 150 MG: 1.5 INJECTION, SOLUTION INTRAVENOUS at 05:03

## 2024-03-02 RX ADMIN — NITROGLYCERIN 1 INCH: 20 OINTMENT TOPICAL at 04:03

## 2024-03-02 RX ADMIN — FUROSEMIDE 40 MG: 10 INJECTION, SOLUTION INTRAMUSCULAR; INTRAVENOUS at 08:03

## 2024-03-02 RX ADMIN — FUROSEMIDE 40 MG: 10 INJECTION, SOLUTION INTRAMUSCULAR; INTRAVENOUS at 04:03

## 2024-03-02 RX ADMIN — PANTOPRAZOLE SODIUM 40 MG: 40 INJECTION, POWDER, LYOPHILIZED, FOR SOLUTION INTRAVENOUS at 11:03

## 2024-03-02 RX ADMIN — ASPIRIN 324 MG: 81 TABLET, CHEWABLE ORAL at 05:03

## 2024-03-02 RX ADMIN — FUROSEMIDE 40 MG: 10 INJECTION, SOLUTION INTRAMUSCULAR; INTRAVENOUS at 11:03

## 2024-03-02 NOTE — ED PROVIDER NOTES
Encounter Date: 3/2/2024       History     Chief Complaint   Patient presents with    Shortness of Breath     Presented from SNF per EMS with c/o shortness of breath. SNF contacted ED and reported that pt has dyspnea O2 sat 80% on RA with PMH of CHF. Recently Lasix was increased from 10 mg daily to 40 mg po. Had her routine 40 mg po today. EMS reports that they have given her a dose of Lasix 40 mg IVP just PTA and she has been on O2 per NRBM. Pt is alert. She denies any chest pain today but reports that she had chest pain 2 days ago. She verifies that she is a DNR in case of cardiopulmonary arrest.      Review of patient's allergies indicates:  No Known Allergies  Past Medical History:   Diagnosis Date    A-fib     Arthritis     Bacteriuria 01/20/2024    Hyperlipidemia     Hypertension     Mitral regurgitation     Systolic heart failure     Thyroid disease      Past Surgical History:   Procedure Laterality Date    APPENDECTOMY      TONSILLECTOMY       History reviewed. No pertinent family history.  Social History     Tobacco Use    Smoking status: Never    Smokeless tobacco: Never   Substance Use Topics    Alcohol use: Never    Drug use: Never     Review of Systems   Constitutional:  Positive for activity change and appetite change.   Respiratory:  Positive for chest tightness and shortness of breath.    Cardiovascular:  Positive for leg swelling. Negative for chest pain.   Gastrointestinal:  Negative for abdominal pain and vomiting.   Genitourinary:  Negative for difficulty urinating.   Neurological:  Positive for weakness (generalized).       Physical Exam     Initial Vitals [03/02/24 1620]   BP Pulse Resp Temp SpO2   (!) 132/99 (!) 131 (!) 24 97.8 °F (36.6 °C) (!) 90 %      MAP       --         Physical Exam    Nursing note and vitals reviewed.  Constitutional: She appears well-developed. She appears distressed (with dyspnea).   HENT:   Head: Normocephalic.   Right Ear: External ear normal.   Left Ear: External  ear normal.   Nose: Nose normal.   Mouth/Throat: Oropharynx is clear and moist.   Neck: Neck supple.   Normal range of motion.  Cardiovascular:  Regular rhythm, normal heart sounds and intact distal pulses.           Pulmonary/Chest: She has wheezes (diminished in lower lobes with occ wheeze).   Abdominal: Abdomen is soft. Bowel sounds are normal. There is abdominal tenderness.   Musculoskeletal:         General: Edema present. No tenderness (pitting bilat lower ext).      Cervical back: Normal range of motion and neck supple.     Neurological: She is alert and oriented to person, place, and time. GCS score is 15. GCS eye subscore is 4. GCS verbal subscore is 5. GCS motor subscore is 6.   Skin: Skin is warm and dry. Capillary refill takes less than 2 seconds.         Medical Screening Exam   See Full Note    ED Course   Procedures  Labs Reviewed   COMPREHENSIVE METABOLIC PANEL - Abnormal; Notable for the following components:       Result Value    CO2 36 (*)     Glucose 157 (*)     BUN 26 (*)     Creatinine 1.33 (*)     Albumin 3.3 (*)     Globulin 4.6 (*)     ALT 63 (*)     eGFR 38 (*)     All other components within normal limits   URINALYSIS, REFLEX TO URINE CULTURE - Abnormal; Notable for the following components:    Blood, UA Trace-Intact (*)     Specific Gravity, UA >=1.030 (*)     All other components within normal limits   APTT - Abnormal; Notable for the following components:    PTT 38.6 (*)     All other components within normal limits   PROTIME-INR - Abnormal; Notable for the following components:    PT 21.2 (*)     All other components within normal limits   NT-PRO NATRIURETIC PEPTIDE - Abnormal; Notable for the following components:    ProBNP 7,385 (*)     All other components within normal limits   MAGNESIUM - Abnormal; Notable for the following components:    Magnesium 2.6 (*)     All other components within normal limits   CBC WITH DIFFERENTIAL - Abnormal; Notable for the following components:    RBC  3.92 (*)     MCV 98.5 (*)     MCH 31.4 (*)     MCHC 31.9 (*)     Neutrophils % 85.7 (*)     Lymphocytes % 7.6 (*)     Lymphocytes, Absolute 0.65 (*)     Eosinophils % 0.8 (*)     All other components within normal limits   URINALYSIS, MICROSCOPIC - Abnormal; Notable for the following components:    Bacteria, UA Few (*)     Squamous Epithelial Cells, UA Few (*)     Mucus, UA Few (*)     All other components within normal limits   INFLUENZA A & B BY MOLECULAR - Normal   LACTIC ACID, PLASMA - Normal   TROPONIN I - Normal   SARS-COV-2 RNA AMPLIFICATION, QUAL - Normal    Narrative:     Negative SARS-CoV results should not be used as the sole basis for treatment or patient management decisions; negative results should be considered in the context of a patient's recent exposures, history and the presene of clinical signs and symptoms consistent with COVID-19.  Negative results should be treated as presumptive and confirmed by molecular assay, if necessary for patient management.   TROPONIN I - Normal   CULTURE, BLOOD   CULTURE, BLOOD   CBC W/ AUTO DIFFERENTIAL    Narrative:     The following orders were created for panel order CBC auto differential.  Procedure                               Abnormality         Status                     ---------                               -----------         ------                     CBC with Differential[[id number]]       Abnormal            Final result                 Please view results for these tests on the individual orders.          Imaging Results              X-Ray Chest AP Portable (Final result)  Result time 03/02/24 16:41:17      Final result by Edwin Degroot MD (03/02/24 16:41:17)                   Impression:      Worsening bilateral opacities and effusions as detailed.      Electronically signed by: Edwin Degroot  Date:    03/02/2024  Time:    16:41               Narrative:    EXAMINATION:  XR CHEST AP PORTABLE    CLINICAL HISTORY:  Chest Pain;    TECHNIQUE:  Single  frontal view of the chest was performed.    COMPARISON:  01/21/2024    FINDINGS:  Cardiac silhouette is enlarged as before.  Since the prior exam there is worsened bilateral interstitial and parenchymal densities.  Increase in the right-sided pleural fluid now moderate.  Small left pleural effusion.  No pneumothorax.                                    X-Rays:   Independently Interpreted Readings:   Chest X-Ray: Cardiac silhouette is enlarged as before.  Since the prior exam there is worsened bilateral interstitial and parenchymal densities.  Increase in the right-sided pleural fluid now moderate.  Small left pleural effusion.  No pneumothorax.     Medications   nitroGLYCERIN 2% TD oint ointment 1 inch (1 inch Topical (Top) Given 3/2/24 1637)   morphine injection 2 mg (2 mg Intravenous Given 3/2/24 1637)   albuterol-ipratropium 2.5 mg-0.5 mg/3 mL nebulizer solution 3 mL (3 mLs Nebulization Given 3/2/24 1637)   furosemide injection 40 mg (40 mg Intravenous Given 3/2/24 1656)   aspirin chewable tablet 324 mg (324 mg Oral Given 3/2/24 1721)   amiodarone in dextrose 150 mg/100 mL (1.5 mg/mL) loading dose 150 mg (0 mg Intravenous Stopped 3/2/24 1745)   furosemide injection 40 mg (40 mg Intravenous Given 3/2/24 2001)   metOLazone tablet 5 mg (5 mg Oral Given 3/2/24 2001)     Medical Decision Making  Presented from SNF per EMS with c/o shortness of breath. SNF contacted ED and reported that pt has dyspnea O2 sat 80% on RA with PMH of CHF. Recently Lasix was increased from 10 mg daily to 40 mg po. Had her routine 40 mg po today. EMS reports that they have given her a dose of Lasix 40 mg IVP just PTA and she has been on O2 per NRBM. Pt is alert. She denies any chest pain today but reports that she had chest pain 2 days ago. She verifies that she is a DNR in case of cardiopulmonary arrest.    Amount and/or Complexity of Data Reviewed  Labs: ordered. Decision-making details documented in ED Course.     Details: WBC 8510 with  H&H 12.3 and 38.6, plt [id number], INR 1.75, Mg 2.6, BNP 7385, K+ 4.1, Na 138, Bun 26, creatinine 1.33, lactic acid 1.3, albumin 3.3, Flu and COVID are negative. Troponin 29, repeat 30.  Radiology: ordered.     Details: CXR shows mod pleural effusion on right, small on left ,cardiomegaly, bilat worsening of interstitial and parenchymal densities.  Discussion of management or test interpretation with external provider(s): Discussed pt's case with Dr. Ureña. Accepted pt for admission here. See ED course notes.    Risk  OTC drugs.  Prescription drug management.  Decision regarding hospitalization.  Risk Details: Lasix 40 mg IVP x 2 doses, Zaroxolyn 5 mg po, Duoneb, NTG paste,  mg po, Morphine 2 mg IVP given. Pt reports is some better. Does not want to be transferred for further cardiology evaluation. Wants minimal treatment for CHF exacerbation and O2 support here. She is DNR. Family is in agreement.               ED Course as of 03/02/24 2154   Sat Mar 02, 2024   1621 Pt place on O2 at 40% per Venti mask upon arrival. O2 sat 92-94%. [DP]   1645 Duoneb given. Wheezing increased. O2 sat 90%. [DP]   1704 WBC: 8.51 [DP]   1704 Hemoglobin: 12.3 [DP]   1704 Hematocrit: 38.6 [DP]   1704 Platelet Count: 277 [DP]   1718 Pt had not voided since dose of lasix per EMS. Lasix 40 mg IVP given. Nelson inserted to monitor UOP. O2 sat 92% still at 40% per venti. [DP]   1721 PT(!): 21.2 [DP]   1721 INR: 1.75 [DP]   1721 PTT(!): 38.6 [DP]   1727 Magnesium (!): 2.6 [DP]   1727 NT-proBNP(!): 7,385 [DP]   1727 Troponin I High Sensitivity: 23.5 [DP]   1727 Sodium: 138 [DP]   1727 Potassium: 4.1 [DP]   1727 BUN(!): 26 [DP]   1727 Creatinine(!): 1.33 [DP]   1727 Lactic Acid Level: 1.3 [DP]   1732 Discussed need for possible transfer with pt. She says that she really does no want be transferred if we can keep her here. -130s with afib. Breathing better but still dyspnea. O2 sat 90-92%. Will give dose of Amiodarone for rate control [DP]    1734 Amiodarone bolus in. Hr 100. O2 sat 88% on O2 at 50% per Venti. /76. Still having dyspnea. [DP]   1752 Influenza A, Molecular: Negative [DP]   1752 Influenza B, Molecular: Negative [DP]   1752 ID NOW COVID-19, (FORTUNATO): Negative [DP]   1752 Albumin(!): 3.3 [DP]   1801 Discussed need for transfer with pt and family. Requests transfer to [facility] as first choice. [DP]   1910 No beds at [facility]. Discussed pt's case with Dr. Ureña, [facility] Hospitalist. He recommends repeating Lasix 40 mg IV, Zaroxolyn 5 mg po and continue to monitor in this ED at this point. [DP]   1914 Discussed plan with pt and family. She is in agreement with staying here in the ED for continue treatment at this time but does not want to be transferred to [facility] if she needs transfer. [DP]   2120 Troponin I High Sensitivity: 30.8 [DP]   2136 Pt's O2 sats are improving 93-94% pm 50% per Venti. UOP total approx 600 ml now. -110s with afib. /90. Pt still having some dyspnea but notes is much better. She says that she wants to stay here and not be transferred. She acknowledges that we have no cardiology here for consult and says that she does no want any heart testing. Instructed that we can diurese  and provide O2 and supportive care. She agrees and notes that she does no wish for any further heart testing and wants to be comfortable. She is DNR. Discussed with Dr. Ureña. He agrees with admit here. [DP]      ED Course User Index  [DP] Genevieve Thomas NP                           Clinical Impression:   Final diagnoses:  [R06.02] Shortness of breath  [I50.23] Acute on chronic systolic CHF (congestive heart failure) (Primary)  [I48.91] Atrial fibrillation with RVR        ED Disposition Condition    Admit Stable                Genevieve Thomas NP  03/02/24 9790

## 2024-03-02 NOTE — ED TRIAGE NOTES
Report from NH nurse Lynette, Pt is having SOB and unable to keep her comfortable.Pt is a DNI-DNR per NH nurse. She needs something to make comfortable and at this time they are not able. Pt is alert on arrival, using a simple face mask see vital signes.

## 2024-03-03 LAB
ALBUMIN SERPL BCP-MCNC: 3.1 G/DL (ref 3.5–5)
ALBUMIN/GLOB SERPL: 0.7 {RATIO}
ALP SERPL-CCNC: 113 U/L (ref 55–142)
ALT SERPL W P-5'-P-CCNC: 54 U/L (ref 13–56)
ANION GAP SERPL CALCULATED.3IONS-SCNC: 7 MMOL/L (ref 7–16)
AST SERPL W P-5'-P-CCNC: 27 U/L (ref 15–37)
BASOPHILS # BLD AUTO: 0.07 K/UL (ref 0–0.2)
BASOPHILS NFR BLD AUTO: 1.1 % (ref 0–1)
BILIRUB SERPL-MCNC: 0.9 MG/DL (ref ?–1.2)
BUN SERPL-MCNC: 27 MG/DL (ref 7–18)
BUN/CREAT SERPL: 20 (ref 6–20)
CALCIUM SERPL-MCNC: 9.2 MG/DL (ref 8.5–10.1)
CHLORIDE SERPL-SCNC: 98 MMOL/L (ref 98–107)
CO2 SERPL-SCNC: 39 MMOL/L (ref 21–32)
CREAT SERPL-MCNC: 1.36 MG/DL (ref 0.55–1.02)
DIFFERENTIAL METHOD BLD: ABNORMAL
EGFR (NO RACE VARIABLE) (RUSH/TITUS): 37 ML/MIN/1.73M2
EOSINOPHIL # BLD AUTO: 0.08 K/UL (ref 0–0.5)
EOSINOPHIL NFR BLD AUTO: 1.2 % (ref 1–4)
ERYTHROCYTE [DISTWIDTH] IN BLOOD BY AUTOMATED COUNT: 14 % (ref 11.5–14.5)
GLOBULIN SER-MCNC: 4.4 G/DL (ref 2–4)
GLUCOSE SERPL-MCNC: 121 MG/DL (ref 74–106)
HCT VFR BLD AUTO: 39.3 % (ref 38–47)
HGB BLD-MCNC: 12.2 G/DL (ref 12–16)
LYMPHOCYTES # BLD AUTO: 1.05 K/UL (ref 1–4.8)
LYMPHOCYTES NFR BLD AUTO: 16.1 % (ref 27–41)
MCH RBC QN AUTO: 31 PG (ref 27–31)
MCHC RBC AUTO-ENTMCNC: 31 G/DL (ref 32–36)
MCV RBC AUTO: 100 FL (ref 80–96)
MONOCYTES # BLD AUTO: 0.72 K/UL (ref 0–0.8)
MONOCYTES NFR BLD AUTO: 11 % (ref 2–6)
MPC BLD CALC-MCNC: 9.7 FL (ref 9.4–12.4)
NEUTROPHILS # BLD AUTO: 4.6 K/UL (ref 1.8–7.7)
NEUTROPHILS NFR BLD AUTO: 70.6 % (ref 53–65)
PLATELET # BLD AUTO: 233 K/UL (ref 150–400)
POTASSIUM SERPL-SCNC: 4 MMOL/L (ref 3.5–5.1)
PROT SERPL-MCNC: 7.5 G/DL (ref 6.4–8.2)
RBC # BLD AUTO: 3.93 M/UL (ref 4.2–5.4)
SODIUM SERPL-SCNC: 140 MMOL/L (ref 136–145)
TSH SERPL DL<=0.005 MIU/L-ACNC: 6.32 UIU/ML (ref 0.36–3.74)
WBC # BLD AUTO: 6.52 K/UL (ref 4.5–11)

## 2024-03-03 PROCEDURE — 84443 ASSAY THYROID STIM HORMONE: CPT | Performed by: NURSE PRACTITIONER

## 2024-03-03 PROCEDURE — 63600175 PHARM REV CODE 636 W HCPCS: Performed by: NURSE PRACTITIONER

## 2024-03-03 PROCEDURE — 27000221 HC OXYGEN, UP TO 24 HOURS

## 2024-03-03 PROCEDURE — 94640 AIRWAY INHALATION TREATMENT: CPT

## 2024-03-03 PROCEDURE — 27000982 HC MATTRESS, MATRIX LAL RENTAL

## 2024-03-03 PROCEDURE — 27000944

## 2024-03-03 PROCEDURE — 25000242 PHARM REV CODE 250 ALT 637 W/ HCPCS: Performed by: NURSE PRACTITIONER

## 2024-03-03 PROCEDURE — 85025 COMPLETE CBC W/AUTO DIFF WBC: CPT | Performed by: NURSE PRACTITIONER

## 2024-03-03 PROCEDURE — 11000001 HC ACUTE MED/SURG PRIVATE ROOM

## 2024-03-03 PROCEDURE — 80053 COMPREHEN METABOLIC PANEL: CPT | Performed by: NURSE PRACTITIONER

## 2024-03-03 PROCEDURE — 94761 N-INVAS EAR/PLS OXIMETRY MLT: CPT

## 2024-03-03 PROCEDURE — 25000003 PHARM REV CODE 250: Performed by: NURSE PRACTITIONER

## 2024-03-03 PROCEDURE — 25000003 PHARM REV CODE 250: Performed by: FAMILY MEDICINE

## 2024-03-03 PROCEDURE — 25000003 PHARM REV CODE 250

## 2024-03-03 RX ORDER — BENZONATATE 100 MG/1
100 CAPSULE ORAL 3 TIMES DAILY PRN
Status: DISCONTINUED | OUTPATIENT
Start: 2024-03-03 | End: 2024-03-07 | Stop reason: HOSPADM

## 2024-03-03 RX ADMIN — IPRATROPIUM BROMIDE AND ALBUTEROL SULFATE 3 ML: 2.5; .5 SOLUTION RESPIRATORY (INHALATION) at 01:03

## 2024-03-03 RX ADMIN — FUROSEMIDE 40 MG: 10 INJECTION, SOLUTION INTRAMUSCULAR; INTRAVENOUS at 09:03

## 2024-03-03 RX ADMIN — IPRATROPIUM BROMIDE AND ALBUTEROL SULFATE 3 ML: 2.5; .5 SOLUTION RESPIRATORY (INHALATION) at 07:03

## 2024-03-03 RX ADMIN — AMIODARONE HYDROCHLORIDE 200 MG: 200 TABLET ORAL at 08:03

## 2024-03-03 RX ADMIN — ATORVASTATIN CALCIUM 20 MG: 10 TABLET, FILM COATED ORAL at 09:03

## 2024-03-03 RX ADMIN — LEVOTHYROXINE SODIUM 75 MCG: 0.03 TABLET ORAL at 05:03

## 2024-03-03 RX ADMIN — SENNOSIDES AND DOCUSATE SODIUM 1 TABLET: 8.6; 5 TABLET ORAL at 08:03

## 2024-03-03 RX ADMIN — POTASSIUM CHLORIDE 20 MEQ: 1500 TABLET, EXTENDED RELEASE ORAL at 08:03

## 2024-03-03 RX ADMIN — METOPROLOL SUCCINATE 100 MG: 50 TABLET, EXTENDED RELEASE ORAL at 08:03

## 2024-03-03 RX ADMIN — ASPIRIN 81 MG: 81 TABLET, COATED ORAL at 08:03

## 2024-03-03 RX ADMIN — MUPIROCIN: 20 OINTMENT TOPICAL at 09:03

## 2024-03-03 RX ADMIN — LOSARTAN POTASSIUM 50 MG: 50 TABLET, FILM COATED ORAL at 08:03

## 2024-03-03 RX ADMIN — BENZONATATE 100 MG: 100 CAPSULE ORAL at 09:03

## 2024-03-03 RX ADMIN — APIXABAN 5 MG: 2.5 TABLET, FILM COATED ORAL at 08:03

## 2024-03-03 RX ADMIN — NITROGLYCERIN 1 INCH: 20 OINTMENT TOPICAL at 09:03

## 2024-03-03 RX ADMIN — IPRATROPIUM BROMIDE AND ALBUTEROL SULFATE 3 ML: 2.5; .5 SOLUTION RESPIRATORY (INHALATION) at 12:03

## 2024-03-03 RX ADMIN — SENNOSIDES AND DOCUSATE SODIUM 1 TABLET: 8.6; 5 TABLET ORAL at 09:03

## 2024-03-03 RX ADMIN — MUPIROCIN: 20 OINTMENT TOPICAL at 08:03

## 2024-03-03 RX ADMIN — APIXABAN 5 MG: 2.5 TABLET, FILM COATED ORAL at 09:03

## 2024-03-03 RX ADMIN — NITROGLYCERIN 1 INCH: 20 OINTMENT TOPICAL at 08:03

## 2024-03-03 NOTE — H&P
[facility] - Medical Surgical Unit  Hospital Medicine  History & Physical    Patient Name: Windy Swann  MRN: [id number]  Patient Class: IP- Inpatient  Admission Date: 3/2/2024  Attending Physician: Poli Hines IV, DO   Primary Care Provider: [facility] Primary Doctor         Patient information was obtained from nursing home, past medical records, and ER records.     Subjective:     Principal Problem:<principal problem not specified>    Chief Complaint:   Chief Complaint   Patient presents with    Shortness of Breath        HPI: Ms. Swann is a 89 year-old  female who is admitted to [facility] acute care services due to acute exacerbation on chronic congestive heart failure, chronic atrial fib with RVR, and acute hypoxia. She is a resident at [facility] and presented to the ED today per EMS with c/o shortness of breath that had been worsening since last night. SNF staff reported to the ED that patient's O2 sat was 80% on RA, has PMH of CHF and recently Lasix was increased from 10 mg daily to 40 mg po. She had been given her routine 40 mg po this am. EMS reported that they had given her a dose of Lasix 40 mg IVP just PTA and she was on O2 per NRBM en route. Upon arrival, Ms. Swann was alert and oriented with obvious dyspnea. She denied any chest pain today but reported that she had chest pain 2 days ago. Her O2 sat was 88%, but was complaining of discomfort with NRBM. She was switched to Venti mask and 40% and ultimately 50% and has been more tolerant of it. In the ED, her CXR shows worsening of interstitial and parenchymal densities from previous. Initial labs show WBC 8510 with H&H 12.3 and 38.6, plt [id number], INR 1.75, Mg 2.6, BNP 7385, K+ 4.1, Na 138, Bun 26, creatinine 1.33, lactic acid 1.3, albumin 3.3, Flu and COVID are negative. Troponin #1 29, troponin #2 30. EKG shows atrial fib with RVR and no other changes from previous. She was given  mg po, NTG 1 inch paste to  chest, Duoneb tx, Morphin 2 mg IVP, Lasix 40 mg IVP x 2 doses, Zaroxolyn 5 mg po, and Amiodarone 150 mg IVPB bolus. Her current home meds include Amiodarone 200mg daily, Metoprolol XL 100mg daily, and Eliquis 5 mg BID. EMR was reviewed. Previous echo shows restrictive cardiomyopathy with severe systolic dysfunction and severe mitral regurgitation and EF of 24%. After initial evaluation and treatment with little improvement, she was planned for transfer; however, patient voiced that she did not want transfer and further evaluation or intervention by cardiology even with risk of deterioration of her condition. She is alert and oriented and able to make her own healthcare decisions. She does not want any resuscitative efforts or intubation in case of deterioration. Her case was discussed with Dr. Ureña, [facility] Hospitalist. She was given the 2nd dose of Lasix and Zaroxolyn 5 mg po. Her initial HR was 130s. It did improve to 100-110s. Her O2 sats did improve from 88-90% to 93-94%. Total UOP was approx 600ml. BP has remained stable. She was accepted for admission here for continued diuresis            Past Medical History:   Diagnosis Date    A-fib     Arthritis     Bacteriuria 01/20/2024    Hyperlipidemia     Hypertension     Mitral regurgitation     Systolic heart failure     Thyroid disease        Past Surgical History:   Procedure Laterality Date    APPENDECTOMY      TONSILLECTOMY         Review of patient's allergies indicates:  No Known Allergies    No current facility-administered medications on file prior to encounter.     Current Outpatient Medications on File Prior to Encounter   Medication Sig    levothyroxine (SYNTHROID) 75 MCG tablet Take 75 mcg by mouth before breakfast.    acetaminophen (TYLENOL) 325 MG tablet Take 2 tablets (650 mg total) by mouth every 6 (six) hours as needed for Temperature greater than (100.4 F).    albuterol-ipratropium (DUO-NEB) 2.5 mg-0.5 mg/3 mL nebulizer solution Take 3 mLs by  nebulization every 6 (six) hours as needed for Wheezing. Rescue    amiodarone (PACERONE) 200 MG Tab Take 1 tablet (200 mg total) by mouth once daily.    apixaban (ELIQUIS) 5 mg Tab Take 1 tablet (5 mg total) by mouth 2 (two) times daily.    aspirin (ECOTRIN) 81 MG EC tablet Take 1 tablet (81 mg total) by mouth once daily.    atorvastatin (LIPITOR) 20 MG tablet Take 1 tablet (20 mg total) by mouth every evening.    calcium carbonate (TUMS) 200 mg calcium (500 mg) chewable tablet Take 1 tablet (500 mg total) by mouth 2 (two) times daily as needed for Heartburn.    docusate sodium (COLACE) 100 MG capsule Take 1 capsule (100 mg total) by mouth daily as needed for Constipation.    furosemide (LASIX) 40 MG tablet Take 1 tablet (40 mg total) by mouth once daily.    losartan (COZAAR) 50 MG tablet Take 1 tablet (50 mg total) by mouth once daily.    melatonin (MELATIN) 3 mg tablet Take 2 tablets (6 mg total) by mouth nightly as needed for Insomnia.    metoprolol succinate (TOPROL-XL) 100 MG 24 hr tablet Take 1 tablet (100 mg total) by mouth once daily.    traZODone (DESYREL) 50 MG tablet Take 0.5 tablets (25 mg total) by mouth nightly as needed for Insomnia.     Family History    None       Tobacco Use    Smoking status: Never    Smokeless tobacco: Never   Substance and Sexual Activity    Alcohol use: Never    Drug use: Never    Sexual activity: Not Currently     Review of Systems   Constitutional:  Positive for activity change. Negative for fever.   Respiratory:  Positive for cough, chest tightness and shortness of breath.    Cardiovascular:  Positive for leg swelling. Negative for chest pain and palpitations.   Gastrointestinal:  Negative for abdominal pain, diarrhea, nausea and vomiting.   Genitourinary:  Negative for difficulty urinating.   Neurological:  Positive for weakness (generalized). Negative for dizziness.     Objective:     Vital Signs (Most Recent):  Temp: 97.6 °F (36.4 °C) (03/02/24 2310)  Pulse: (!) 129  (03/02/24 2310)  Resp: 20 (03/02/24 2310)  BP: 123/65 (03/02/24 2310)  SpO2: 95 % (03/02/24 2310) Vital Signs (24h Range):  Temp:  [97.6 °F (36.4 °C)-97.8 °F (36.6 °C)] 97.6 °F (36.4 °C)  Pulse:  [105-137] 129  Resp:  [18-30] 20  SpO2:  [88 %-95 %] 95 %  BP: (112-136)/(65-99) 123/65     Weight: 73.5 kg (162 lb)  Body mass index is 31.12 kg/m².     Physical Exam  Vitals and nursing note reviewed.   Constitutional:       Appearance: She is ill-appearing.   HENT:      Head: Normocephalic.      Mouth/Throat:      Mouth: Mucous membranes are dry.   Eyes:      Extraocular Movements: Extraocular movements intact.      Conjunctiva/sclera: Conjunctivae normal.   Cardiovascular:      Rate and Rhythm: Tachycardia present.      Pulses: Normal pulses.      Heart sounds: Normal heart sounds.   Pulmonary:      Breath sounds: No stridor. Wheezing present. No rhonchi or rales.      Comments: Noted dyspnea, tachypnea  Chest:      Chest wall: No tenderness.   Abdominal:      General: Bowel sounds are normal.      Palpations: Abdomen is soft.      Tenderness: There is no abdominal tenderness.   Musculoskeletal:      Cervical back: Normal range of motion and neck supple.      Right lower leg: Edema present.      Left lower leg: Edema present.   Skin:     General: Skin is warm and dry.      Capillary Refill: Capillary refill takes less than 2 seconds.   Neurological:      General: No focal deficit present.      Mental Status: She is alert and oriented to person, place, and time.   Psychiatric:         Mood and Affect: Mood normal.                Significant Labs: All pertinent labs within the past 24 hours have been reviewed.  Blood Culture: pending  CBC:   Recent Labs   Lab 03/02/24  1656   WBC 8.51   HGB 12.3   HCT 38.6        CMP:   Recent Labs   Lab 03/02/24  1656      K 4.1   CL 99   CO2 36*   *   BUN 26*   CREATININE 1.33*   CALCIUM 9.0   PROT 7.9   ALBUMIN 3.3*   BILITOT 0.8   ALKPHOS 126   AST 32   ALT 63*    ANIONGAP 7     Lactic Acid:   Recent Labs   Lab 03/02/24  1656   LACTATE 1.3     Magnesium:   Recent Labs   Lab 03/02/24  1656   MG 2.6*     Troponin:   Recent Labs   Lab 03/02/24  1656 03/02/24  2101 03/02/24  2307   TROPONINIHS 23.5 30.8 27.4     Urine Studies:   Recent Labs   Lab 03/02/24  1710   COLORU Yellow   APPEARANCEUA Clear   PHUR 6.0   SPECGRAV >=1.030*   PROTEINUA Negative   GLUCUA Negative   KETONESU Negative   BILIRUBINUA Negative   OCCULTUA Trace-Intact*   NITRITE Negative   UROBILINOGEN 0.2   LEUKOCYTESUR Negative   RBCUA 0-3   WBCUA 0-5   BACTERIA Few*       Significant Imaging: I have reviewed all pertinent imaging results/findings within the past 24 hours.  Assessment/Plan:     Acute on chronic systolic CHF (congestive heart failure)  Echo    Interpretation Summary    Left Ventricle: The left ventricle is normal in size. Normal wall thickness. There is eccentric hypertrophy. Regional wall motion abnormalities present. Anterior and amy-septal wall appears akinetic There is severely reduced systolic function with a visually estimated ejection fraction of 20 - 25%. Biplane (2D) method of discs ejection fraction is 24%. Unable to assess diastolic function due to atrial fibrillation.    Right Ventricle: Normal right ventricular cavity size. Systolic function is normal.    Left Atrium: Left atrium is severely dilated.    Right Atrium: Right atrium is severely dilated.    Aortic Valve: The aortic valve is a trileaflet valve.    Mitral Valve: There is posterior leaflet sclerosis. There is annular dilation present. There is severe regurgitation.    Tricuspid Valve: There is severe regurgitation.    Pulmonic Valve: There is mild regurgitation.    Pulmonary Artery: The estimated pulmonary artery systolic pressure is 51 mmHg.    IVC/SVC: Elevated venous pressure at 15 mmHg.    ECHO suggestive of restrictive cardiomyopathy with severe systolic dysfunction and severe mitral regurgitation    BNP 7385,  Troponin 29 and 30 in ED  CXR shows mod pleural effusion on right and small on left in addition to worsening of interstitial and parenchymal densities  Lasix 40 mg IVP per EMS, Total 80 mg IVP given in ED, zaroxolyn 5 mg po given in ED.  Lasix 40 mg IVP q12h,  mg daily, NTG paste q12h.   Monitor O2 sat and breathing.    Acute respiratory failure with hypoxia  Monitor O2 sats  O2 to keep sats > 92%  DNR/DNI    Atrial fibrillation with rapid ventricular response  Amiodarone 150 mg IVPB given in ED.  Cardiac monitor  Continue Eliquis 5 mg po BID, Amiodarone 200mg po daily, Metoprolol  mg po daily.      VTE Risk Mitigation (From admission, onward)           Ordered     apixaban tablet 5 mg  2 times daily         03/02/24 2328     IP VTE HIGH RISK PATIENT  Once         03/02/24 2303     Place FABIOLA hose  Until discontinued         03/02/24 2303                                    Genevieve Thomas NP  Department of Hospital Medicine  [facility] - Medical Surgical Unit

## 2024-03-03 NOTE — HOSPITAL COURSE
3/3/24 - patient awake and resting in bed at the time of the exam today.  She does still up your dyspneic and tachypneic but is on O2 of 40% and reports that her breathing is improved and she is comfortable at this time.  She did receive Zaroxolyn p.o. and is currently on Lasix 40 mg IV q.12 and topical nitro paste.  She continues to confirmed that she was not want any extraordinary measures taken.  We did discuss considering BiPAP if the patient's condition worsens but she reports that she has a attempted BiPAP in the past and was unable to tolerate it.  Case was discussed with Dr. Cueto and we will continue current care.    03/04/24 Awake and resting in bed. Continues to complain of having SOB- states it is not as bad as when she came in but that she continues to have SOB. Sat on 4 liters is 95% on 4 liters. Diuresing with lasix - creatinine has increased to 1.48 from 1.36 yesterday. Weight is up 1.3 lbs from yesterday. Last echo suggestive of restrictive cardiomyopathy with severe systolic dysfunction and severe mitral regurgitation; EF around 20-25% . Continue lasix 40 mg IV daily.      03/04/24 continues to complain of SOB. States oxygen is not helping her any. O2 sat on 4 liters is 94%. She has diuresed 800 ccs this shift. Continues to have tachypnea and noted to JVD. Will increase lasix to 40 mg IV every 12 hours. Continue to weight daily, strict intake and output. Lactic acid level pending.  Lactic acid level is 2.0.    03/05/24 Resting in bed. States she is breathing easier this morning. Weight is down 2.5 lbs from yesterday. Creatinine is better at 1.38. (Creatinine 0.89 on 01/22/24) Potassium is low at 3.2. Will replace orally. Continue diuresis. Probable dc tomorrow.    03/06/24 Resting in bed. States she was breathing easier this morning until she wet the bed and nurses came in to clean her up. States she is tired out from that. Weight is down to 159 lbs- down another  1 1/2 lb from yesterday. She  states she is just so tired. She does not why the [facility] has taken her already.  Talked with her re continued diuresis and she is agreeable. Also talked with her re hospice care as an option if she would like that- explained to her that hospice role is palliative- would keep her comfortable. For now, she wants to continue with diuresis.     03/07/24 Resting in bed. Reports breathing easier this morning. Weight is down to 158 lb 9 oz. Creatinine improved to 1.16. Potassium 3.3 - potassium 20 meq given this morning. Will dc back to [facility] today. Will require o2 per nc at 2 liters. Continue daily lasix at 40 mg. Continue potassium 20 meq- check BMP tomorrow.

## 2024-03-03 NOTE — ED NOTES
Increased oxygen to 50% venturi mask.  
Report given to ODILIA Parisi RN.  
Transferred per stretcher to room 1120.  
Xray in room for portable cxr.   
15-May-2023 00:05

## 2024-03-03 NOTE — PLAN OF CARE
Problem: Adult Inpatient Plan of Care  Goal: Patient-Specific Goal (Individualized)  Outcome: Ongoing, Progressing  Goal: Absence of Hospital-Acquired Illness or Injury  Outcome: Ongoing, Progressing     Problem: Fall Injury Risk  Goal: Absence of Fall and Fall-Related Injury  Outcome: Ongoing, Progressing  Intervention: Promote Injury-Free Environment  Flowsheets (Taken 3/3/2024 0449)  Safety Promotion/Fall Prevention:   assistive device/personal item within reach   bed alarm set   medications reviewed   instructed to call staff for mobility   side rails raised x 2     Problem: Adult Inpatient Plan of Care  Goal: Patient-Specific Goal (Individualized)  Outcome: Ongoing, Progressing  Goal: Absence of Hospital-Acquired Illness or Injury  Outcome: Ongoing, Progressing     Problem: Fall Injury Risk  Goal: Absence of Fall and Fall-Related Injury  Outcome: Ongoing, Progressing  Intervention: Promote Injury-Free Environment  Flowsheets (Taken 3/3/2024 0449)  Safety Promotion/Fall Prevention:   assistive device/personal item within reach   bed alarm set   medications reviewed   instructed to call staff for mobility   side rails raised x 2

## 2024-03-03 NOTE — SUBJECTIVE & OBJECTIVE
Past Medical History:   Diagnosis Date    A-fib     Arthritis     Bacteriuria 01/20/2024    Hyperlipidemia     Hypertension     Mitral regurgitation     Systolic heart failure     Thyroid disease        Past Surgical History:   Procedure Laterality Date    APPENDECTOMY      TONSILLECTOMY         Review of patient's allergies indicates:  No Known Allergies    No current facility-administered medications on file prior to encounter.     Current Outpatient Medications on File Prior to Encounter   Medication Sig    levothyroxine (SYNTHROID) 75 MCG tablet Take 75 mcg by mouth before breakfast.    acetaminophen (TYLENOL) 325 MG tablet Take 2 tablets (650 mg total) by mouth every 6 (six) hours as needed for Temperature greater than (100.4 F).    albuterol-ipratropium (DUO-NEB) 2.5 mg-0.5 mg/3 mL nebulizer solution Take 3 mLs by nebulization every 6 (six) hours as needed for Wheezing. Rescue    amiodarone (PACERONE) 200 MG Tab Take 1 tablet (200 mg total) by mouth once daily.    apixaban (ELIQUIS) 5 mg Tab Take 1 tablet (5 mg total) by mouth 2 (two) times daily.    aspirin (ECOTRIN) 81 MG EC tablet Take 1 tablet (81 mg total) by mouth once daily.    atorvastatin (LIPITOR) 20 MG tablet Take 1 tablet (20 mg total) by mouth every evening.    calcium carbonate (TUMS) 200 mg calcium (500 mg) chewable tablet Take 1 tablet (500 mg total) by mouth 2 (two) times daily as needed for Heartburn.    docusate sodium (COLACE) 100 MG capsule Take 1 capsule (100 mg total) by mouth daily as needed for Constipation.    furosemide (LASIX) 40 MG tablet Take 1 tablet (40 mg total) by mouth once daily.    losartan (COZAAR) 50 MG tablet Take 1 tablet (50 mg total) by mouth once daily.    melatonin (MELATIN) 3 mg tablet Take 2 tablets (6 mg total) by mouth nightly as needed for Insomnia.    metoprolol succinate (TOPROL-XL) 100 MG 24 hr tablet Take 1 tablet (100 mg total) by mouth once daily.    traZODone (DESYREL) 50 MG tablet Take 0.5 tablets (25  mg total) by mouth nightly as needed for Insomnia.     Family History    None       Tobacco Use    Smoking status: Never    Smokeless tobacco: Never   Substance and Sexual Activity    Alcohol use: Never    Drug use: Never    Sexual activity: Not Currently     Review of Systems   Constitutional:  Positive for activity change. Negative for fever.   Respiratory:  Positive for cough, chest tightness and shortness of breath.    Cardiovascular:  Positive for leg swelling. Negative for chest pain and palpitations.   Gastrointestinal:  Negative for abdominal pain, diarrhea, nausea and vomiting.   Genitourinary:  Negative for difficulty urinating.   Neurological:  Positive for weakness (generalized). Negative for dizziness.     Objective:     Vital Signs (Most Recent):  Temp: 97.6 °F (36.4 °C) (03/02/24 2310)  Pulse: (!) 129 (03/02/24 2310)  Resp: 20 (03/02/24 2310)  BP: 123/65 (03/02/24 2310)  SpO2: 95 % (03/02/24 2310) Vital Signs (24h Range):  Temp:  [97.6 °F (36.4 °C)-97.8 °F (36.6 °C)] 97.6 °F (36.4 °C)  Pulse:  [105-137] 129  Resp:  [18-30] 20  SpO2:  [88 %-95 %] 95 %  BP: (112-136)/(65-99) 123/65     Weight: 73.5 kg (162 lb)  Body mass index is 31.12 kg/m².     Physical Exam  Vitals and nursing note reviewed.   Constitutional:       Appearance: She is ill-appearing.   HENT:      Head: Normocephalic.      Mouth/Throat:      Mouth: Mucous membranes are dry.   Eyes:      Extraocular Movements: Extraocular movements intact.      Conjunctiva/sclera: Conjunctivae normal.   Cardiovascular:      Rate and Rhythm: Tachycardia present.      Pulses: Normal pulses.      Heart sounds: Normal heart sounds.   Pulmonary:      Breath sounds: No stridor. Wheezing present. No rhonchi or rales.      Comments: Noted dyspnea, tachypnea  Chest:      Chest wall: No tenderness.   Abdominal:      General: Bowel sounds are normal.      Palpations: Abdomen is soft.      Tenderness: There is no abdominal tenderness.   Musculoskeletal:       Cervical back: Normal range of motion and neck supple.      Right lower leg: Edema present.      Left lower leg: Edema present.   Skin:     General: Skin is warm and dry.      Capillary Refill: Capillary refill takes less than 2 seconds.   Neurological:      General: No focal deficit present.      Mental Status: She is alert and oriented to person, place, and time.   Psychiatric:         Mood and Affect: Mood normal.                Significant Labs: All pertinent labs within the past 24 hours have been reviewed.  Blood Culture: pending  CBC:   Recent Labs   Lab 03/02/24  1656   WBC 8.51   HGB 12.3   HCT 38.6        CMP:   Recent Labs   Lab 03/02/24  1656      K 4.1   CL 99   CO2 36*   *   BUN 26*   CREATININE 1.33*   CALCIUM 9.0   PROT 7.9   ALBUMIN 3.3*   BILITOT 0.8   ALKPHOS 126   AST 32   ALT 63*   ANIONGAP 7     Lactic Acid:   Recent Labs   Lab 03/02/24  1656   LACTATE 1.3     Magnesium:   Recent Labs   Lab 03/02/24  1656   MG 2.6*     Troponin:   Recent Labs   Lab 03/02/24  1656 03/02/24  2101 03/02/24  2307   TROPONINIHS 23.5 30.8 27.4     Urine Studies:   Recent Labs   Lab 03/02/24  1710   COLORU Yellow   APPEARANCEUA Clear   PHUR 6.0   SPECGRAV >=1.030*   PROTEINUA Negative   GLUCUA Negative   KETONESU Negative   BILIRUBINUA Negative   OCCULTUA Trace-Intact*   NITRITE Negative   UROBILINOGEN 0.2   LEUKOCYTESUR Negative   RBCUA 0-3   WBCUA 0-5   BACTERIA Few*       Significant Imaging: I have reviewed all pertinent imaging results/findings within the past 24 hours.

## 2024-03-03 NOTE — ASSESSMENT & PLAN NOTE
Amiodarone 150 mg IVPB given in ED.  Cardiac monitor  Continue Eliquis 5 mg po BID, Amiodarone 200mg po daily, Metoprolol  mg po daily.

## 2024-03-03 NOTE — PROGRESS NOTES
[facility] - Medical Surgical Unit  Hospital Medicine  Progress Note    Patient Name: Windy Swann  MRN: [id number]  Patient Class: IP- Inpatient   Admission Date: 3/2/2024  Length of Stay: 1 days  Attending Physician: Poli Hines IV, DO  Primary Care Provider: Alyssa, Primary Doctor        Subjective:     Principal Problem:Acute on chronic systolic CHF (congestive heart failure)        HPI:  Ms. Swann is a 89 year-old  female who is admitted to [facility] acute care services due to acute exacerbation on chronic congestive heart failure, chronic atrial fib with RVR, and acute hypoxia. She is a resident at [facility] and presented to the ED today per EMS with c/o shortness of breath that had been worsening since last night. SNF staff reported to the ED that patient's O2 sat was 80% on RA, has PMH of CHF and recently Lasix was increased from 10 mg daily to 40 mg po. She had been given her routine 40 mg po this am. EMS reported that they had given her a dose of Lasix 40 mg IVP just PTA and she was on O2 per NRBM en route. Upon arrival, Ms. Swann was alert and oriented with obvious dyspnea. She denied any chest pain today but reported that she had chest pain 2 days ago. Her O2 sat was 88%, but was complaining of discomfort with NRBM. She was switched to Venti mask and 40% and ultimately 50% and has been more tolerant of it. In the ED, her CXR shows worsening of interstitial and parenchymal densities from previous. Initial labs show WBC 8510 with H&H 12.3 and 38.6, plt [id number], INR 1.75, Mg 2.6, BNP 7385, K+ 4.1, Na 138, Bun 26, creatinine 1.33, lactic acid 1.3, albumin 3.3, Flu and COVID are negative. Troponin #1 29, troponin #2 30. EKG shows atrial fib with RVR and no other changes from previous. She was given  mg po, NTG 1 inch paste to chest, Duoneb tx, Morphin 2 mg IVP, Lasix 40 mg IVP x 2 doses, Zaroxolyn 5 mg po, and Amiodarone 150 mg IVPB bolus. Her current home meds  include Amiodarone 200mg daily, Metoprolol XL 100mg daily, and Eliquis 5 mg BID. EMR was reviewed. Previous echo shows restrictive cardiomyopathy with severe systolic dysfunction and severe mitral regurgitation and EF of 24%. After initial evaluation and treatment with little improvement, she was planned for transfer; however, patient voiced that she did not want transfer and further evaluation or intervention by cardiology even with risk of deterioration of her condition. She is alert and oriented and able to make her own healthcare decisions. She does not want any resuscitative efforts or intubation in case of deterioration. Her case was discussed with Dr. Ureña, [facility] Hospitalist. She was given the 2nd dose of Lasix and Zaroxolyn 5 mg po. Her initial HR was 130s. It did improve to 100-110s. Her O2 sats did improve from 88-90% to 93-94%. Total UOP was approx 600ml. BP has remained stable. She was accepted for admission here for continued diuresis            Overview/Hospital Course:  3/3/24 - patient awake and resting in bed at the time of the exam today.  She does still up your dyspneic and tachypneic but is on O2 of 40% and reports that her breathing is improved and she is comfortable at this time.  She did receive Zaroxolyn p.o. and is currently on Lasix 40 mg IV q.12 and topical nitro paste.  She continues to confirmed that she was not want any extraordinary measures taken.  We did discuss considering BiPAP if the patient's condition worsens but she reports that she has a attempted BiPAP in the past and was unable to tolerate it.  Case was discussed with Dr. Cueto and we will continue current care.    Interval History:  Patient reports improvement in her breathing.  States she is comfortable at the current 40% O2 by Ventimask.     Review of Systems   Constitutional:  Positive for activity change (decreased). Negative for chills and fever.   HENT:  Positive for congestion. Negative for drooling and trouble  swallowing.    Eyes: Negative.    Respiratory:  Positive for cough and shortness of breath. Negative for chest tightness, wheezing and stridor.    Cardiovascular:  Positive for palpitations and leg swelling. Negative for chest pain.   Gastrointestinal:  Negative for abdominal pain, diarrhea, nausea and vomiting.   Endocrine: Negative.    Genitourinary:  Negative for dysuria and flank pain.   Musculoskeletal:  Negative for arthralgias, back pain, neck pain and neck stiffness.   Skin:  Negative for color change, pallor and rash.   Allergic/Immunologic: Negative.    Neurological:  Positive for weakness (generalized). Negative for dizziness, syncope, speech difficulty, light-headedness and headaches.   Psychiatric/Behavioral:  Negative for confusion. The patient is not nervous/anxious.    All other systems reviewed and are negative.    Objective:     Vital Signs (Most Recent):  Temp: 97.6 °F (36.4 °C) (03/03/24 0357)  Pulse: (!) 120 (03/03/24 0716)  Resp: 20 (03/03/24 0716)  BP: 124/89 (03/03/24 0712)  SpO2: (!) 94 % (03/03/24 0716) Vital Signs (24h Range):  Temp:  [97.6 °F (36.4 °C)-97.8 °F (36.6 °C)] 97.6 °F (36.4 °C)  Pulse:  [105-137] 120  Resp:  [18-30] 20  SpO2:  [88 %-95 %] 94 %  BP: (112-136)/(65-99) 124/89     Weight: 73.5 kg (162 lb)  Body mass index is 31.12 kg/m².    Intake/Output Summary (Last 24 hours) at 3/3/2024 0805  Last data filed at 3/3/2024 0605  Gross per 24 hour   Intake 320 ml   Output 1300 ml   Net -980 ml         Physical Exam  Vitals and nursing note reviewed.   Constitutional:       General: She is in acute distress (mild).   HENT:      Head: Normocephalic and atraumatic.      Nose: Nose normal.      Mouth/Throat:      Mouth: Mucous membranes are moist.   Eyes:      Extraocular Movements: Extraocular movements intact.      Conjunctiva/sclera: Conjunctivae normal.      Pupils: Pupils are equal, round, and reactive to light.   Neck:      Vascular: JVD present.   Cardiovascular:      Rate and  Rhythm: Tachycardia present. Rhythm irregular.      Pulses: Normal pulses.   Pulmonary:      Effort: Tachypnea and respiratory distress (mild) present.      Breath sounds: Normal air entry. No stridor or decreased air movement. Examination of the right-lower field reveals rales. Examination of the left-lower field reveals rales. Rales present. No decreased breath sounds, wheezing or rhonchi.   Abdominal:      General: Bowel sounds are normal. There is no distension.      Palpations: Abdomen is soft.      Tenderness: There is no abdominal tenderness. There is no guarding.   Musculoskeletal:         General: Normal range of motion.      Cervical back: Normal range of motion and neck supple.      Right lower leg: Edema present.      Left lower leg: Edema present.   Skin:     General: Skin is warm and dry.      Capillary Refill: Capillary refill takes less than 2 seconds.   Neurological:      General: No focal deficit present.      Mental Status: She is alert and oriented to person, place, and time.   Psychiatric:         Mood and Affect: Mood normal.         Behavior: Behavior normal.         Thought Content: Thought content normal.         Judgment: Judgment normal.             Significant Labs: All pertinent labs within the past 24 hours have been reviewed.    Significant Imaging: I have reviewed all pertinent imaging results/findings within the past 24 hours.    Assessment/Plan:      * Acute on chronic systolic CHF (congestive heart failure)  Echo    Interpretation Summary    Left Ventricle: The left ventricle is normal in size. Normal wall thickness. There is eccentric hypertrophy. Regional wall motion abnormalities present. Anterior and amy-septal wall appears akinetic There is severely reduced systolic function with a visually estimated ejection fraction of 20 - 25%. Biplane (2D) method of discs ejection fraction is 24%. Unable to assess diastolic function due to atrial fibrillation.    Right Ventricle: Normal  right ventricular cavity size. Systolic function is normal.    Left Atrium: Left atrium is severely dilated.    Right Atrium: Right atrium is severely dilated.    Aortic Valve: The aortic valve is a trileaflet valve.    Mitral Valve: There is posterior leaflet sclerosis. There is annular dilation present. There is severe regurgitation.    Tricuspid Valve: There is severe regurgitation.    Pulmonic Valve: There is mild regurgitation.    Pulmonary Artery: The estimated pulmonary artery systolic pressure is 51 mmHg.    IVC/SVC: Elevated venous pressure at 15 mmHg.    ECHO suggestive of restrictive cardiomyopathy with severe systolic dysfunction and severe mitral regurgitation    BNP 7385, Troponin 29 and 30 in ED  CXR shows mod pleural effusion on right and small on left in addition to worsening of interstitial and parenchymal densities  Lasix 40 mg IVP per EMS, Total 80 mg IVP given in ED, zaroxolyn 5 mg po given in ED.  Lasix 40 mg IVP q12h,  mg daily, NTG paste q12h.   Monitor O2 sat and breathing.    Acute respiratory failure with hypoxia  Monitor O2 sats  O2 to keep sats > 92%  DNR/DNI    Atrial fibrillation with rapid ventricular response  Amiodarone 150 mg IVPB given in ED.  Cardiac monitor  Continue Eliquis 5 mg po BID, Amiodarone 200mg po daily, Metoprolol  mg po daily.      VTE Risk Mitigation (From admission, onward)           Ordered     apixaban tablet 5 mg  2 times daily         03/02/24 2328     IP VTE HIGH RISK PATIENT  Once         03/02/24 2303     Place FABIOLA hose  Until discontinued         03/02/24 2303                    Discharge Planning   SELWYN:      Code Status: DNR   Is the patient medically ready for discharge?:     Reason for patient still in hospital (select all that apply): Patient trending condition and Treatment                     ALVARO CASTAÑEDA  Department of Hospital Medicine   [facility] - Medical Surgical Unit

## 2024-03-03 NOTE — ASSESSMENT & PLAN NOTE
Echo    Interpretation Summary    Left Ventricle: The left ventricle is normal in size. Normal wall thickness. There is eccentric hypertrophy. Regional wall motion abnormalities present. Anterior and amy-septal wall appears akinetic There is severely reduced systolic function with a visually estimated ejection fraction of 20 - 25%. Biplane (2D) method of discs ejection fraction is 24%. Unable to assess diastolic function due to atrial fibrillation.    Right Ventricle: Normal right ventricular cavity size. Systolic function is normal.    Left Atrium: Left atrium is severely dilated.    Right Atrium: Right atrium is severely dilated.    Aortic Valve: The aortic valve is a trileaflet valve.    Mitral Valve: There is posterior leaflet sclerosis. There is annular dilation present. There is severe regurgitation.    Tricuspid Valve: There is severe regurgitation.    Pulmonic Valve: There is mild regurgitation.    Pulmonary Artery: The estimated pulmonary artery systolic pressure is 51 mmHg.    IVC/SVC: Elevated venous pressure at 15 mmHg.    ECHO suggestive of restrictive cardiomyopathy with severe systolic dysfunction and severe mitral regurgitation    BNP 7385, Troponin 29 and 30 in ED  CXR shows mod pleural effusion on right and small on left in addition to worsening of interstitial and parenchymal densities  Lasix 40 mg IVP per EMS, Total 80 mg IVP given in ED, zaroxolyn 5 mg po given in ED.  Lasix 40 mg IVP q12h,  mg daily, NTG paste q12h.   Monitor O2 sat and breathing.

## 2024-03-03 NOTE — NURSING
Admitted to room 1120 awake alert no distress noted at this time skin warm and dry no request offered o2 in use instructed not to remove voiced understanding no questions or concerns at this time, oriented to room and surroundingds

## 2024-03-03 NOTE — ASSESSMENT & PLAN NOTE
Echo    Interpretation Summary    Left Ventricle: The left ventricle is normal in size. Normal wall thickness. There is eccentric hypertrophy. Regional wall motion abnormalities present. Anterior and amy-septal wall appears akinetic There is severely reduced systolic function with a visually estimated ejection fraction of 20 - 25%. Biplane (2D) method of discs ejection fraction is 24%. Unable to assess diastolic function due to atrial fibrillation.    Right Ventricle: Normal right ventricular cavity size. Systolic function is normal.    Left Atrium: Left atrium is severely dilated.    Right Atrium: Right atrium is severely dilated.    Aortic Valve: The aortic valve is a trileaflet valve.    Mitral Valve: There is posterior leaflet sclerosis. There is annular dilation present. There is severe regurgitation.    Tricuspid Valve: There is severe regurgitation.    Pulmonic Valve: There is mild regurgitation.    Pulmonary Artery: The estimated pulmonary artery systolic pressure is 51 mmHg.    IVC/SVC: Elevated venous pressure at 15 mmHg.    ECHO suggestive of restrictive cardiomyopathy with severe systolic dysfunction and severe mitral regurgitation    BNP 7385, Troponin 29 and 30 in ED  CXR shows mod pleural effusion on right and small on left in addition to worsening of interstitial and parenchymal densities  Lasix 40 mg IVP per EMS, Total 80 mg IVP given in ED, zaroxolyn 5 mg po given in ED.  Lasix 40 mg IVP q12h,  mg daily, NTG paste q12h.   Monitor O2 sat and breathing.

## 2024-03-03 NOTE — SUBJECTIVE & OBJECTIVE
Interval History:  Patient reports improvement in her breathing.  States she is comfortable at the current 40% O2 by Ventimask.     Review of Systems   Constitutional:  Positive for activity change (decreased). Negative for chills and fever.   HENT:  Positive for congestion. Negative for drooling and trouble swallowing.    Eyes: Negative.    Respiratory:  Positive for cough and shortness of breath. Negative for chest tightness, wheezing and stridor.    Cardiovascular:  Positive for palpitations and leg swelling. Negative for chest pain.   Gastrointestinal:  Negative for abdominal pain, diarrhea, nausea and vomiting.   Endocrine: Negative.    Genitourinary:  Negative for dysuria and flank pain.   Musculoskeletal:  Negative for arthralgias, back pain, neck pain and neck stiffness.   Skin:  Negative for color change, pallor and rash.   Allergic/Immunologic: Negative.    Neurological:  Positive for weakness (generalized). Negative for dizziness, syncope, speech difficulty, light-headedness and headaches.   Psychiatric/Behavioral:  Negative for confusion. The patient is not nervous/anxious.    All other systems reviewed and are negative.    Objective:     Vital Signs (Most Recent):  Temp: 97.6 °F (36.4 °C) (03/03/24 0357)  Pulse: (!) 120 (03/03/24 0716)  Resp: 20 (03/03/24 0716)  BP: 124/89 (03/03/24 0712)  SpO2: (!) 94 % (03/03/24 0716) Vital Signs (24h Range):  Temp:  [97.6 °F (36.4 °C)-97.8 °F (36.6 °C)] 97.6 °F (36.4 °C)  Pulse:  [105-137] 120  Resp:  [18-30] 20  SpO2:  [88 %-95 %] 94 %  BP: (112-136)/(65-99) 124/89     Weight: 73.5 kg (162 lb)  Body mass index is 31.12 kg/m².    Intake/Output Summary (Last 24 hours) at 3/3/2024 0805  Last data filed at 3/3/2024 0605  Gross per 24 hour   Intake 320 ml   Output 1300 ml   Net -980 ml         Physical Exam  Vitals and nursing note reviewed.   Constitutional:       General: She is in acute distress (mild).   HENT:      Head: Normocephalic and atraumatic.      Nose: Nose  normal.      Mouth/Throat:      Mouth: Mucous membranes are moist.   Eyes:      Extraocular Movements: Extraocular movements intact.      Conjunctiva/sclera: Conjunctivae normal.      Pupils: Pupils are equal, round, and reactive to light.   Neck:      Vascular: JVD present.   Cardiovascular:      Rate and Rhythm: Tachycardia present. Rhythm irregular.      Pulses: Normal pulses.   Pulmonary:      Effort: Tachypnea and respiratory distress (mild) present.      Breath sounds: Normal air entry. No stridor or decreased air movement. Examination of the right-lower field reveals rales. Examination of the left-lower field reveals rales. Rales present. No decreased breath sounds, wheezing or rhonchi.   Abdominal:      General: Bowel sounds are normal. There is no distension.      Palpations: Abdomen is soft.      Tenderness: There is no abdominal tenderness. There is no guarding.   Musculoskeletal:         General: Normal range of motion.      Cervical back: Normal range of motion and neck supple.      Right lower leg: Edema present.      Left lower leg: Edema present.   Skin:     General: Skin is warm and dry.      Capillary Refill: Capillary refill takes less than 2 seconds.   Neurological:      General: No focal deficit present.      Mental Status: She is alert and oriented to person, place, and time.   Psychiatric:         Mood and Affect: Mood normal.         Behavior: Behavior normal.         Thought Content: Thought content normal.         Judgment: Judgment normal.             Significant Labs: All pertinent labs within the past 24 hours have been reviewed.    Significant Imaging: I have reviewed all pertinent imaging results/findings within the past 24 hours.

## 2024-03-03 NOTE — HPI
Ms. Swann is a 89 year-old  female who is admitted to [facility] acute care services due to acute exacerbation on chronic congestive heart failure, chronic atrial fib with RVR, and acute hypoxia. She is a resident at [facility] and presented to the ED today per EMS with c/o shortness of breath that had been worsening since last night. SNF staff reported to the ED that patient's O2 sat was 80% on RA, has PMH of CHF and recently Lasix was increased from 10 mg daily to 40 mg po. She had been given her routine 40 mg po this am. EMS reported that they had given her a dose of Lasix 40 mg IVP just PTA and she was on O2 per NRBM en route. Upon arrival, Ms. Swann was alert and oriented with obvious dyspnea. She denied any chest pain today but reported that she had chest pain 2 days ago. Her O2 sat was 88%, but was complaining of discomfort with NRBM. She was switched to Venti mask and 40% and ultimately 50% and has been more tolerant of it. In the ED, her CXR shows worsening of interstitial and parenchymal densities from previous. Initial labs show WBC 8510 with H&H 12.3 and 38.6, plt [id number], INR 1.75, Mg 2.6, BNP 7385, K+ 4.1, Na 138, Bun 26, creatinine 1.33, lactic acid 1.3, albumin 3.3, Flu and COVID are negative. Troponin #1 29, troponin #2 30. EKG shows atrial fib with RVR and no other changes from previous. She was given  mg po, NTG 1 inch paste to chest, Duoneb tx, Morphin 2 mg IVP, Lasix 40 mg IVP x 2 doses, Zaroxolyn 5 mg po, and Amiodarone 150 mg IVPB bolus. Her current home meds include Amiodarone 200mg daily, Metoprolol XL 100mg daily, and Eliquis 5 mg BID. EMR was reviewed. Previous echo shows restrictive cardiomyopathy with severe systolic dysfunction and severe mitral regurgitation and EF of 24%. After initial evaluation and treatment with little improvement, she was planned for transfer; however, patient voiced that she did not want transfer and further evaluation or  intervention by cardiology even with risk of deterioration of her condition. She is alert and oriented and able to make her own healthcare decisions. She does not want any resuscitative efforts or intubation in case of deterioration. Her case was discussed with Dr. Ureña, [facility] Hospitalist. She was given the 2nd dose of Lasix and Zaroxolyn 5 mg po. Her initial HR was 130s. It did improve to 100-110s. Her O2 sats did improve from 88-90% to 93-94%. Total UOP was approx 600ml. BP has remained stable. She was accepted for admission here for continued diuresis

## 2024-03-04 PROBLEM — R06.02 SHORTNESS OF BREATH: Status: ACTIVE | Noted: 2024-03-04

## 2024-03-04 LAB
ALBUMIN SERPL BCP-MCNC: 3.1 G/DL (ref 3.5–5)
ALBUMIN/GLOB SERPL: 0.7 {RATIO}
ALP SERPL-CCNC: 107 U/L (ref 55–142)
ALT SERPL W P-5'-P-CCNC: 52 U/L (ref 13–56)
ANION GAP SERPL CALCULATED.3IONS-SCNC: 8 MMOL/L (ref 7–16)
AST SERPL W P-5'-P-CCNC: 28 U/L (ref 15–37)
BASOPHILS # BLD AUTO: 0.07 K/UL (ref 0–0.2)
BASOPHILS NFR BLD AUTO: 1 % (ref 0–1)
BILIRUB SERPL-MCNC: 0.9 MG/DL (ref ?–1.2)
BUN SERPL-MCNC: 27 MG/DL (ref 7–18)
BUN/CREAT SERPL: 18 (ref 6–20)
CALCIUM SERPL-MCNC: 9.4 MG/DL (ref 8.5–10.1)
CHLORIDE SERPL-SCNC: 96 MMOL/L (ref 98–107)
CO2 SERPL-SCNC: 38 MMOL/L (ref 21–32)
CREAT SERPL-MCNC: 1.48 MG/DL (ref 0.55–1.02)
DIFFERENTIAL METHOD BLD: ABNORMAL
EGFR (NO RACE VARIABLE) (RUSH/TITUS): 33 ML/MIN/1.73M2
EOSINOPHIL # BLD AUTO: 0.18 K/UL (ref 0–0.5)
EOSINOPHIL NFR BLD AUTO: 2.5 % (ref 1–4)
ERYTHROCYTE [DISTWIDTH] IN BLOOD BY AUTOMATED COUNT: 14 % (ref 11.5–14.5)
GLOBULIN SER-MCNC: 4.3 G/DL (ref 2–4)
GLUCOSE SERPL-MCNC: 126 MG/DL (ref 74–106)
HCT VFR BLD AUTO: 41.4 % (ref 38–47)
HGB BLD-MCNC: 12.4 G/DL (ref 12–16)
LACTATE SERPL-SCNC: 2 MMOL/L (ref 0.4–2)
LYMPHOCYTES # BLD AUTO: 1.03 K/UL (ref 1–4.8)
LYMPHOCYTES NFR BLD AUTO: 14.5 % (ref 27–41)
MCH RBC QN AUTO: 30.4 PG (ref 27–31)
MCHC RBC AUTO-ENTMCNC: 30 G/DL (ref 32–36)
MCV RBC AUTO: 101.5 FL (ref 80–96)
MONOCYTES # BLD AUTO: 0.48 K/UL (ref 0–0.8)
MONOCYTES NFR BLD AUTO: 6.8 % (ref 2–6)
MPC BLD CALC-MCNC: 10.1 FL (ref 9.4–12.4)
NEUTROPHILS # BLD AUTO: 5.32 K/UL (ref 1.8–7.7)
NEUTROPHILS NFR BLD AUTO: 75.2 % (ref 53–65)
PLATELET # BLD AUTO: 272 K/UL (ref 150–400)
POTASSIUM SERPL-SCNC: 4.1 MMOL/L (ref 3.5–5.1)
PROT SERPL-MCNC: 7.4 G/DL (ref 6.4–8.2)
RBC # BLD AUTO: 4.08 M/UL (ref 4.2–5.4)
SODIUM SERPL-SCNC: 138 MMOL/L (ref 136–145)
WBC # BLD AUTO: 7.08 K/UL (ref 4.5–11)

## 2024-03-04 PROCEDURE — 80053 COMPREHEN METABOLIC PANEL: CPT | Performed by: NURSE PRACTITIONER

## 2024-03-04 PROCEDURE — 25000003 PHARM REV CODE 250

## 2024-03-04 PROCEDURE — 11000001 HC ACUTE MED/SURG PRIVATE ROOM

## 2024-03-04 PROCEDURE — 27000982 HC MATTRESS, MATRIX LAL RENTAL

## 2024-03-04 PROCEDURE — 99900035 HC TECH TIME PER 15 MIN (STAT)

## 2024-03-04 PROCEDURE — 94761 N-INVAS EAR/PLS OXIMETRY MLT: CPT

## 2024-03-04 PROCEDURE — 27000944

## 2024-03-04 PROCEDURE — 94640 AIRWAY INHALATION TREATMENT: CPT

## 2024-03-04 PROCEDURE — 63600175 PHARM REV CODE 636 W HCPCS: Performed by: NURSE PRACTITIONER

## 2024-03-04 PROCEDURE — 83605 ASSAY OF LACTIC ACID: CPT | Performed by: NURSE PRACTITIONER

## 2024-03-04 PROCEDURE — 25000003 PHARM REV CODE 250: Performed by: NURSE PRACTITIONER

## 2024-03-04 PROCEDURE — 27000221 HC OXYGEN, UP TO 24 HOURS

## 2024-03-04 PROCEDURE — 25000242 PHARM REV CODE 250 ALT 637 W/ HCPCS: Performed by: NURSE PRACTITIONER

## 2024-03-04 PROCEDURE — 99232 SBSQ HOSP IP/OBS MODERATE 35: CPT | Mod: FS,,, | Performed by: FAMILY MEDICINE

## 2024-03-04 PROCEDURE — 85025 COMPLETE CBC W/AUTO DIFF WBC: CPT | Performed by: NURSE PRACTITIONER

## 2024-03-04 RX ORDER — FUROSEMIDE 10 MG/ML
40 INJECTION INTRAMUSCULAR; INTRAVENOUS DAILY
Status: DISCONTINUED | OUTPATIENT
Start: 2024-03-05 | End: 2024-03-04

## 2024-03-04 RX ORDER — FUROSEMIDE 10 MG/ML
40 INJECTION INTRAMUSCULAR; INTRAVENOUS EVERY 12 HOURS
Status: DISCONTINUED | OUTPATIENT
Start: 2024-03-04 | End: 2024-03-07 | Stop reason: HOSPADM

## 2024-03-04 RX ORDER — FUROSEMIDE 10 MG/ML
40 INJECTION INTRAMUSCULAR; INTRAVENOUS EVERY 12 HOURS
Status: DISCONTINUED | OUTPATIENT
Start: 2024-03-04 | End: 2024-03-04

## 2024-03-04 RX ADMIN — IPRATROPIUM BROMIDE AND ALBUTEROL SULFATE 3 ML: 2.5; .5 SOLUTION RESPIRATORY (INHALATION) at 07:03

## 2024-03-04 RX ADMIN — BENZONATATE 100 MG: 100 CAPSULE ORAL at 09:03

## 2024-03-04 RX ADMIN — IPRATROPIUM BROMIDE AND ALBUTEROL SULFATE 3 ML: 2.5; .5 SOLUTION RESPIRATORY (INHALATION) at 12:03

## 2024-03-04 RX ADMIN — LOSARTAN POTASSIUM 50 MG: 50 TABLET, FILM COATED ORAL at 08:03

## 2024-03-04 RX ADMIN — LEVOTHYROXINE SODIUM 75 MCG: 0.03 TABLET ORAL at 06:03

## 2024-03-04 RX ADMIN — NITROGLYCERIN 1 INCH: 20 OINTMENT TOPICAL at 09:03

## 2024-03-04 RX ADMIN — APIXABAN 5 MG: 2.5 TABLET, FILM COATED ORAL at 08:03

## 2024-03-04 RX ADMIN — MUPIROCIN: 20 OINTMENT TOPICAL at 09:03

## 2024-03-04 RX ADMIN — FUROSEMIDE 40 MG: 10 INJECTION, SOLUTION INTRAMUSCULAR; INTRAVENOUS at 05:03

## 2024-03-04 RX ADMIN — POTASSIUM CHLORIDE 20 MEQ: 1500 TABLET, EXTENDED RELEASE ORAL at 08:03

## 2024-03-04 RX ADMIN — IPRATROPIUM BROMIDE AND ALBUTEROL SULFATE 3 ML: 2.5; .5 SOLUTION RESPIRATORY (INHALATION) at 01:03

## 2024-03-04 RX ADMIN — AMIODARONE HYDROCHLORIDE 200 MG: 200 TABLET ORAL at 08:03

## 2024-03-04 RX ADMIN — ASPIRIN 81 MG: 81 TABLET, COATED ORAL at 09:03

## 2024-03-04 RX ADMIN — SENNOSIDES AND DOCUSATE SODIUM 1 TABLET: 8.6; 5 TABLET ORAL at 09:03

## 2024-03-04 RX ADMIN — BENZONATATE 100 MG: 100 CAPSULE ORAL at 06:03

## 2024-03-04 RX ADMIN — METOPROLOL SUCCINATE 100 MG: 50 TABLET, EXTENDED RELEASE ORAL at 08:03

## 2024-03-04 RX ADMIN — FUROSEMIDE 40 MG: 10 INJECTION, SOLUTION INTRAMUSCULAR; INTRAVENOUS at 08:03

## 2024-03-04 RX ADMIN — ATORVASTATIN CALCIUM 20 MG: 10 TABLET, FILM COATED ORAL at 09:03

## 2024-03-04 RX ADMIN — APIXABAN 5 MG: 2.5 TABLET, FILM COATED ORAL at 09:03

## 2024-03-04 RX ADMIN — NITROGLYCERIN 1 INCH: 20 OINTMENT TOPICAL at 08:03

## 2024-03-04 RX ADMIN — SENNOSIDES AND DOCUSATE SODIUM 1 TABLET: 8.6; 5 TABLET ORAL at 08:03

## 2024-03-04 RX ADMIN — MUPIROCIN: 20 OINTMENT TOPICAL at 08:03

## 2024-03-04 NOTE — PROGRESS NOTES
[facility] - Medical Surgical Unit  Hospital Medicine  Progress Note    Patient Name: Windy Swann  MRN: [id number]  Patient Class: IP- Inpatient   Admission Date: 3/2/2024  Length of Stay: 2 days  Attending Physician: Poli Hines IV, DO  Primary Care Provider: Alyssa, Primary Doctor        Subjective:     Principal Problem:Acute on chronic systolic CHF (congestive heart failure)        HPI:  Ms. Swann is a 89 year-old  female who is admitted to [facility] acute care services due to acute exacerbation on chronic congestive heart failure, chronic atrial fib with RVR, and acute hypoxia. She is a resident at [facility] and presented to the ED today per EMS with c/o shortness of breath that had been worsening since last night. SNF staff reported to the ED that patient's O2 sat was 80% on RA, has PMH of CHF and recently Lasix was increased from 10 mg daily to 40 mg po. She had been given her routine 40 mg po this am. EMS reported that they had given her a dose of Lasix 40 mg IVP just PTA and she was on O2 per NRBM en route. Upon arrival, Ms. Swann was alert and oriented with obvious dyspnea. She denied any chest pain today but reported that she had chest pain 2 days ago. Her O2 sat was 88%, but was complaining of discomfort with NRBM. She was switched to Venti mask and 40% and ultimately 50% and has been more tolerant of it. In the ED, her CXR shows worsening of interstitial and parenchymal densities from previous. Initial labs show WBC 8510 with H&H 12.3 and 38.6, plt [id number], INR 1.75, Mg 2.6, BNP 7385, K+ 4.1, Na 138, Bun 26, creatinine 1.33, lactic acid 1.3, albumin 3.3, Flu and COVID are negative. Troponin #1 29, troponin #2 30. EKG shows atrial fib with RVR and no other changes from previous. She was given  mg po, NTG 1 inch paste to chest, Duoneb tx, Morphin 2 mg IVP, Lasix 40 mg IVP x 2 doses, Zaroxolyn 5 mg po, and Amiodarone 150 mg IVPB bolus. Her current home meds  include Amiodarone 200mg daily, Metoprolol XL 100mg daily, and Eliquis 5 mg BID. EMR was reviewed. Previous echo shows restrictive cardiomyopathy with severe systolic dysfunction and severe mitral regurgitation and EF of 24%. After initial evaluation and treatment with little improvement, she was planned for transfer; however, patient voiced that she did not want transfer and further evaluation or intervention by cardiology even with risk of deterioration of her condition. She is alert and oriented and able to make her own healthcare decisions. She does not want any resuscitative efforts or intubation in case of deterioration. Her case was discussed with Dr. Ureña, [facility] Hospitalist. She was given the 2nd dose of Lasix and Zaroxolyn 5 mg po. Her initial HR was 130s. It did improve to 100-110s. Her O2 sats did improve from 88-90% to 93-94%. Total UOP was approx 600ml. BP has remained stable. She was accepted for admission here for continued diuresis            Overview/Hospital Course:  3/3/24 - patient awake and resting in bed at the time of the exam today.  She does still up your dyspneic and tachypneic but is on O2 of 40% and reports that her breathing is improved and she is comfortable at this time.  She did receive Zaroxolyn p.o. and is currently on Lasix 40 mg IV q.12 and topical nitro paste.  She continues to confirmed that she was not want any extraordinary measures taken.  We did discuss considering BiPAP if the patient's condition worsens but she reports that she has a attempted BiPAP in the past and was unable to tolerate it.  Case was discussed with Dr. Cueto and we will continue current care.    03/04/24 Awake and resting in bed. Continues to complain of having SOB- states it is not as bad as when she came in but that she continues to have SOB. Sat on 4 liters is 95% on 4 liters. Diuresing with lasix - creatinine has increased to 1.48 from 1.36 yesterday. Weight is up 1.3 lbs from yesterday. Last echo  suggestive of restrictive cardiomyopathy with severe systolic dysfunction and severe mitral regurgitation; EF around 20-25% . Continue lasix 40 mg IV daily.    Interval History: SOB    Review of Systems   Constitutional:  Negative for appetite change.   Respiratory:  Positive for cough and shortness of breath.    Cardiovascular:  Negative for chest pain and palpitations.   Gastrointestinal:  Negative for abdominal pain, constipation, diarrhea, nausea and vomiting.   Genitourinary:  Negative for difficulty urinating and dysuria.   Musculoskeletal:  Negative for arthralgias.   Neurological:  Positive for weakness. Negative for headaches.     Objective:     Vital Signs (Most Recent):  Temp: 97.4 °F (36.3 °C) (03/04/24 0716)  Pulse: 90 (03/04/24 0728)  Resp: 20 (03/04/24 0728)  BP: 117/77 (03/04/24 0716)  SpO2: 95 % (03/04/24 0728) Vital Signs (24h Range):  Temp:  [97.3 °F (36.3 °C)-97.9 °F (36.6 °C)] 97.4 °F (36.3 °C)  Pulse:  [] 90  Resp:  [18-26] 20  SpO2:  [86 %-96 %] 95 %  BP: (108-133)/(49-90) 117/77     Weight: 74 kg (163 lb 3.2 oz)  Body mass index is 31.35 kg/m².    Intake/Output Summary (Last 24 hours) at 3/4/2024 0947  Last data filed at 3/4/2024 0856  Gross per 24 hour   Intake 1540 ml   Output 700 ml   Net 840 ml         Physical Exam  HENT:      Head: Normocephalic.      Mouth/Throat:      Mouth: Mucous membranes are moist.   Cardiovascular:      Rate and Rhythm: Normal rate. Rhythm irregular.      Pulses: Normal pulses.      Heart sounds: Normal heart sounds.   Pulmonary:      Breath sounds: Rales present.      Comments: O2 sat on 4 liters is 95%  Rales, tachypnea  Abdominal:      General: Bowel sounds are normal. There is no distension.      Palpations: Abdomen is soft. There is no mass.      Tenderness: There is no abdominal tenderness. There is no guarding or rebound.      Hernia: No hernia is present.   Musculoskeletal:         General: Normal range of motion.      Cervical back: Normal range of  motion.      Right lower leg: Edema present.      Left lower leg: Edema present.   Skin:     General: Skin is warm and dry.   Neurological:      Mental Status: She is alert and oriented to person, place, and time.      Motor: Weakness present.   Psychiatric:         Mood and Affect: Mood normal.             Significant Labs: All pertinent labs within the past 24 hours have been reviewed.  Recent Lab Results         03/04/24  0559        Albumin/Globulin Ratio 0.7       Albumin 3.1              ALT 52       Anion Gap 8       AST 28       Baso # 0.07       Basophil % 1.0       BILIRUBIN TOTAL 0.9       BUN 27       BUN/CREAT RATIO 18       Calcium 9.4       Chloride 96       CO2 38       Creatinine 1.48       Differential Method Auto       eGFR 33       Eos # 0.18       Eos % 2.5       Globulin, Total 4.3       Glucose 126       Hematocrit 41.4       Hemoglobin 12.4       Lymph # 1.03       Lymph % 14.5       MCH 30.4       MCHC 30.0       .5       Mono # 0.48       Mono % 6.8       MPV 10.1       Neutrophils, Abs 5.32       Neutrophils Relative 75.2       Platelet Count 272       Potassium 4.1       PROTEIN TOTAL 7.4       RBC 4.08       RDW 14.0       Sodium 138       WBC 7.08               Significant Imaging: I have reviewed all pertinent imaging results/findings within the past 24 hours.    Assessment/Plan:      * Acute on chronic systolic CHF (congestive heart failure)  Echo    Interpretation Summary    Left Ventricle: The left ventricle is normal in size. Normal wall thickness. There is eccentric hypertrophy. Regional wall motion abnormalities present. Anterior and amy-septal wall appears akinetic There is severely reduced systolic function with a visually estimated ejection fraction of 20 - 25%. Biplane (2D) method of discs ejection fraction is 24%. Unable to assess diastolic function due to atrial fibrillation.    Right Ventricle: Normal right ventricular cavity size. Systolic function is  normal.    Left Atrium: Left atrium is severely dilated.    Right Atrium: Right atrium is severely dilated.    Aortic Valve: The aortic valve is a trileaflet valve.    Mitral Valve: There is posterior leaflet sclerosis. There is annular dilation present. There is severe regurgitation.    Tricuspid Valve: There is severe regurgitation.    Pulmonic Valve: There is mild regurgitation.    Pulmonary Artery: The estimated pulmonary artery systolic pressure is 51 mmHg.    IVC/SVC: Elevated venous pressure at 15 mmHg.    ECHO suggestive of restrictive cardiomyopathy with severe systolic dysfunction and severe mitral regurgitation    BNP 7385, Troponin 29 and 30 in ED  CXR shows mod pleural effusion on right and small on left in addition to worsening of interstitial and parenchymal densities  Lasix 40 mg IVP per EMS, Total 80 mg IVP given in ED, zaroxolyn 5 mg po given in ED.  Lasix 40 mg IVP q12h,  mg daily, NTG paste q12h.   Monitor O2 sat and breathing.      03/04/24 continues to complain of sob, rales heard, tachypneic. Sat on 4 liters is 95%. Continue duiresis with lasix 40 mg IV daily. Weigh daily    Atrial fibrillation with rapid ventricular response  Amiodarone 150 mg IVPB given in ED.  Cardiac monitor  Continue Eliquis 5 mg po BID, Amiodarone 200mg po daily, Metoprolol  mg po daily.      03/04/24 rate 108, irregular    Acute respiratory failure with hypoxia  Monitor O2 sats  O2 to keep sats > 92%  DNR/DNI    03/04/24 sat on 4 liters is 95%      VTE Risk Mitigation (From admission, onward)           Ordered     apixaban tablet 5 mg  2 times daily         03/02/24 2328     IP VTE HIGH RISK PATIENT  Once         03/02/24 2303     Place FABIOLA hose  Until discontinued         03/02/24 2303                    Discharge Planning   SELWYN:      Code Status: DNR   Is the patient medically ready for discharge?:     Reason for patient still in hospital (select all that apply): Treatment  Discharge Plan A: Skilled  Nursing Facility                  ALVARO Roac  Department of Hospital Medicine   [facility] - [facility]

## 2024-03-04 NOTE — PLAN OF CARE
[facility]  Initial Discharge Assessment       Primary Care Provider: No, Primary Doctor    Admission Diagnosis: Shortness of breath [R06.02]  Atrial fibrillation with RVR [I48.91]  Acute on chronic systolic CHF (congestive heart failure) [I50.23]    Admission Date: 3/2/2024  Expected Discharge Date:     Transition of Care Barriers: (P) None    Payor: MEDICARE / Plan: MEDICARE PART A & B / Product Type: Government /     Extended Emergency Contact Information  Primary Emergency Contact: Marilee King  Mobile Phone: [phone]  Relation: Friend  Preferred language: English   needed? No    Discharge Plan A: (P) Skilled Nursing Facility         [facility] [place] - [address]  [address] [place] [address]  Phone: [phone] Fax: [phone]      Initial Assessment (most recent)       Adult Discharge Assessment - 03/04/24 0902          Discharge Assessment    Assessment Type Discharge Planning Assessment (P)      Confirmed/corrected address, phone number and insurance Yes (P)      Confirmed Demographics Correct on Facesheet (P)      Source of Information patient;health record (P)      Communicated SELWYN with patient/caregiver Date not available/Unable to determine (P)      Reason For Admission CHF (P)      People in Home facility resident (P)      Facility Arrived From: [facility] (P)      Do you expect to return to your current living situation? Yes (P)      Do you have help at home or someone to help you manage your care at home? Yes (P)      Who are your caregiver(s) and their phone number(s)? Staff at [facility] (P)      Current cognitive status: Alert/Oriented (P)      Walking or Climbing Stairs Difficulty yes (P)      Walking or Climbing Stairs ambulation difficulty, assistance 1 person;ambulation difficulty, requires equipment (P)      Mobility Management Weakness (P)      Dressing/Bathing Difficulty yes (P)       Dressing/Bathing bathing difficulty, assistance 1 person;dressing difficulty, assistance 1 person (P)      Dressing/Bathing Management Requires assist (P)      Home Accessibility wheelchair accessible (P)      Home Layout Able to live on 1st floor (P)      Equipment Currently Used at Home -- (P)    Lives at [facility] where all equipment is provided    Patient currently being followed by outpatient case management? No (P)      Do you currently have service(s) that help you manage your care at home? Yes (P)      Name and Contact number of agency [facility] at [facility] (P)      Do you take prescription medications? Yes (P)      Do you have prescription coverage? Yes (P)      Coverage Medicare (P)      Do you have any problems affording any of your prescribed medications? No (P)      Is the patient taking medications as prescribed? yes (P)      Who is going to help you get home at discharge? Staff at [facility] (P)      How do you get to doctors appointments? other (see comments) (P)      Are you on dialysis? No (P)      Do you take coumadin? No (P)      Discharge Plan A Skilled Nursing Facility (P)      DME Needed Upon Discharge  none (P)      Discharge Plan discussed with: Patient (P)      Transition of Care Barriers None (P)         Physical Activity    On average, how many days per week do you engage in moderate to strenuous exercise (like a brisk walk)? 0 days (P)      On average, how many minutes do you engage in exercise at this level? 0 min (P)         Financial Resource Strain    How hard is it for you to pay for the very basics like food, housing, medical care, and heating? Not hard at all (P)         Housing Stability    In the last 12 months, was there a time when you were not able to pay the mortgage or rent on time? Yes (P)      In the last 12 months, how many places have you lived? 2 (P)      In the last 12 months, was there a time when you did not have a steady place to sleep or slept in a shelter  (including now)? No (P)         Transportation Needs    In the past 12 months, has lack of transportation kept you from medical appointments or from getting medications? No (P)      In the past 12 months, has lack of transportation kept you from meetings, work, or from getting things needed for daily living? No (P)         Food Insecurity    Within the past 12 months, you worried that your food would run out before you got the money to buy more. Never true (P)      Within the past 12 months, the food you bought just didn't last and you didn't have money to get more. Never true (P)         Stress    Do you feel stress - tense, restless, nervous, or anxious, or unable to sleep at night because your mind is troubled all the time - these days? To some extent (P)         Social Connections    In a typical week, how many times do you talk on the phone with family, friends, or neighbors? Three times a week (P)      How often do you get together with friends or relatives? More than three times a week (P)      How often do you attend Mu-ism or Confucianism services? 1 to 4 times per year (P)      Do you belong to any clubs or organizations such as Mu-ism groups, unions, fraternal or athletic groups, or school groups? No (P)      How often do you attend meetings of the clubs or organizations you belong to? 1 to 4 times per year (P)         Alcohol Use    Q1: How often do you have a drink containing alcohol? Never (P)      Q2: How many drinks containing alcohol do you have on a typical day when you are drinking? Patient does not drink (P)      Q3: How often do you have six or more drinks on one occasion? Never (P)                       Ms. Swann resides at [facility] at this time.  Spoke with her about discharge plans and she says I'm not supposed to rudy anything until my step daughter gets her tomorrow.  Told her to make sure she call me when her family is present.  I asked her if it was her plan to return to  [facility] and she says whatever Marie tells me to do.  Will talk with Marie as soon as she's available.

## 2024-03-04 NOTE — SUBJECTIVE & OBJECTIVE
Interval History: SOB    Review of Systems   Constitutional:  Negative for appetite change.   Respiratory:  Positive for cough and shortness of breath.    Cardiovascular:  Negative for chest pain and palpitations.   Gastrointestinal:  Negative for abdominal pain, constipation, diarrhea, nausea and vomiting.   Genitourinary:  Negative for difficulty urinating and dysuria.   Musculoskeletal:  Negative for arthralgias.   Neurological:  Positive for weakness. Negative for headaches.     Objective:     Vital Signs (Most Recent):  Temp: 97.4 °F (36.3 °C) (03/04/24 0716)  Pulse: 90 (03/04/24 0728)  Resp: 20 (03/04/24 0728)  BP: 117/77 (03/04/24 0716)  SpO2: 95 % (03/04/24 0728) Vital Signs (24h Range):  Temp:  [97.3 °F (36.3 °C)-97.9 °F (36.6 °C)] 97.4 °F (36.3 °C)  Pulse:  [] 90  Resp:  [18-26] 20  SpO2:  [86 %-96 %] 95 %  BP: (108-133)/(49-90) 117/77     Weight: 74 kg (163 lb 3.2 oz)  Body mass index is 31.35 kg/m².    Intake/Output Summary (Last 24 hours) at 3/4/2024 0947  Last data filed at 3/4/2024 0856  Gross per 24 hour   Intake 1540 ml   Output 700 ml   Net 840 ml         Physical Exam  HENT:      Head: Normocephalic.      Mouth/Throat:      Mouth: Mucous membranes are moist.   Cardiovascular:      Rate and Rhythm: Normal rate. Rhythm irregular.      Pulses: Normal pulses.      Heart sounds: Normal heart sounds.   Pulmonary:      Breath sounds: Rales present.      Comments: O2 sat on 4 liters is 95%  Rales, tachypnea  Abdominal:      General: Bowel sounds are normal. There is no distension.      Palpations: Abdomen is soft. There is no mass.      Tenderness: There is no abdominal tenderness. There is no guarding or rebound.      Hernia: No hernia is present.   Musculoskeletal:         General: Normal range of motion.      Cervical back: Normal range of motion.      Right lower leg: Edema present.      Left lower leg: Edema present.   Skin:     General: Skin is warm and dry.   Neurological:      Mental Status:  She is alert and oriented to person, place, and time.      Motor: Weakness present.   Psychiatric:         Mood and Affect: Mood normal.             Significant Labs: All pertinent labs within the past 24 hours have been reviewed.  Recent Lab Results         03/04/24  0559        Albumin/Globulin Ratio 0.7       Albumin 3.1              ALT 52       Anion Gap 8       AST 28       Baso # 0.07       Basophil % 1.0       BILIRUBIN TOTAL 0.9       BUN 27       BUN/CREAT RATIO 18       Calcium 9.4       Chloride 96       CO2 38       Creatinine 1.48       Differential Method Auto       eGFR 33       Eos # 0.18       Eos % 2.5       Globulin, Total 4.3       Glucose 126       Hematocrit 41.4       Hemoglobin 12.4       Lymph # 1.03       Lymph % 14.5       MCH 30.4       MCHC 30.0       .5       Mono # 0.48       Mono % 6.8       MPV 10.1       Neutrophils, Abs 5.32       Neutrophils Relative 75.2       Platelet Count 272       Potassium 4.1       PROTEIN TOTAL 7.4       RBC 4.08       RDW 14.0       Sodium 138       WBC 7.08               Significant Imaging: I have reviewed all pertinent imaging results/findings within the past 24 hours.

## 2024-03-04 NOTE — ASSESSMENT & PLAN NOTE
Amiodarone 150 mg IVPB given in ED.  Cardiac monitor  Continue Eliquis 5 mg po BID, Amiodarone 200mg po daily, Metoprolol  mg po daily.      03/04/24 rate 108, irregular

## 2024-03-04 NOTE — ASSESSMENT & PLAN NOTE
Echo    Interpretation Summary    Left Ventricle: The left ventricle is normal in size. Normal wall thickness. There is eccentric hypertrophy. Regional wall motion abnormalities present. Anterior and amy-septal wall appears akinetic There is severely reduced systolic function with a visually estimated ejection fraction of 20 - 25%. Biplane (2D) method of discs ejection fraction is 24%. Unable to assess diastolic function due to atrial fibrillation.    Right Ventricle: Normal right ventricular cavity size. Systolic function is normal.    Left Atrium: Left atrium is severely dilated.    Right Atrium: Right atrium is severely dilated.    Aortic Valve: The aortic valve is a trileaflet valve.    Mitral Valve: There is posterior leaflet sclerosis. There is annular dilation present. There is severe regurgitation.    Tricuspid Valve: There is severe regurgitation.    Pulmonic Valve: There is mild regurgitation.    Pulmonary Artery: The estimated pulmonary artery systolic pressure is 51 mmHg.    IVC/SVC: Elevated venous pressure at 15 mmHg.    ECHO suggestive of restrictive cardiomyopathy with severe systolic dysfunction and severe mitral regurgitation    BNP 7385, Troponin 29 and 30 in ED  CXR shows mod pleural effusion on right and small on left in addition to worsening of interstitial and parenchymal densities  Lasix 40 mg IVP per EMS, Total 80 mg IVP given in ED, zaroxolyn 5 mg po given in ED.  Lasix 40 mg IVP q12h,  mg daily, NTG paste q12h.   Monitor O2 sat and breathing.      03/04/24 continues to complain of sob, rales heard, tachypneic. Sat on 4 liters is 95%. Continue duiresis with lasix 40 mg IV daily. Weigh daily

## 2024-03-05 PROBLEM — E87.6 HYPOKALEMIA: Status: ACTIVE | Noted: 2024-03-05

## 2024-03-05 LAB
ALBUMIN SERPL BCP-MCNC: 3 G/DL (ref 3.5–5)
ALBUMIN/GLOB SERPL: 0.7 {RATIO}
ALP SERPL-CCNC: 111 U/L (ref 55–142)
ALT SERPL W P-5'-P-CCNC: 48 U/L (ref 13–56)
ANION GAP SERPL CALCULATED.3IONS-SCNC: 5 MMOL/L (ref 7–16)
ANION GAP SERPL CALCULATED.3IONS-SCNC: 6 MMOL/L (ref 7–16)
AST SERPL W P-5'-P-CCNC: 27 U/L (ref 15–37)
BASOPHILS # BLD AUTO: 0.06 K/UL (ref 0–0.2)
BASOPHILS NFR BLD AUTO: 0.8 % (ref 0–1)
BILIRUB SERPL-MCNC: 1.1 MG/DL (ref ?–1.2)
BUN SERPL-MCNC: 27 MG/DL (ref 7–18)
BUN SERPL-MCNC: 27 MG/DL (ref 7–18)
BUN/CREAT SERPL: 20 (ref 6–20)
BUN/CREAT SERPL: 20 (ref 6–20)
CALCIUM SERPL-MCNC: 9.1 MG/DL (ref 8.5–10.1)
CALCIUM SERPL-MCNC: 9.4 MG/DL (ref 8.5–10.1)
CHLORIDE SERPL-SCNC: 94 MMOL/L (ref 98–107)
CHLORIDE SERPL-SCNC: 95 MMOL/L (ref 98–107)
CO2 SERPL-SCNC: 38 MMOL/L (ref 21–32)
CO2 SERPL-SCNC: 40 MMOL/L (ref 21–32)
CREAT SERPL-MCNC: 1.32 MG/DL (ref 0.55–1.02)
CREAT SERPL-MCNC: 1.38 MG/DL (ref 0.55–1.02)
DIFFERENTIAL METHOD BLD: ABNORMAL
EGFR (NO RACE VARIABLE) (RUSH/TITUS): 36 ML/MIN/1.73M2
EGFR (NO RACE VARIABLE) (RUSH/TITUS): 38 ML/MIN/1.73M2
EOSINOPHIL # BLD AUTO: 0.12 K/UL (ref 0–0.5)
EOSINOPHIL NFR BLD AUTO: 1.6 % (ref 1–4)
ERYTHROCYTE [DISTWIDTH] IN BLOOD BY AUTOMATED COUNT: 13.8 % (ref 11.5–14.5)
GLOBULIN SER-MCNC: 4.2 G/DL (ref 2–4)
GLUCOSE SERPL-MCNC: 100 MG/DL (ref 74–106)
GLUCOSE SERPL-MCNC: 112 MG/DL (ref 74–106)
HCT VFR BLD AUTO: 40.7 % (ref 38–47)
HGB BLD-MCNC: 12.5 G/DL (ref 12–16)
LYMPHOCYTES # BLD AUTO: 0.92 K/UL (ref 1–4.8)
LYMPHOCYTES NFR BLD AUTO: 12 % (ref 27–41)
MCH RBC QN AUTO: 30.5 PG (ref 27–31)
MCHC RBC AUTO-ENTMCNC: 30.7 G/DL (ref 32–36)
MCV RBC AUTO: 99.3 FL (ref 80–96)
MONOCYTES # BLD AUTO: 0.53 K/UL (ref 0–0.8)
MONOCYTES NFR BLD AUTO: 6.9 % (ref 2–6)
MPC BLD CALC-MCNC: 10 FL (ref 9.4–12.4)
NEUTROPHILS # BLD AUTO: 6.02 K/UL (ref 1.8–7.7)
NEUTROPHILS NFR BLD AUTO: 78.7 % (ref 53–65)
PLATELET # BLD AUTO: 284 K/UL (ref 150–400)
POTASSIUM SERPL-SCNC: 3.2 MMOL/L (ref 3.5–5.1)
POTASSIUM SERPL-SCNC: 4.4 MMOL/L (ref 3.5–5.1)
PROT SERPL-MCNC: 7.2 G/DL (ref 6.4–8.2)
RBC # BLD AUTO: 4.1 M/UL (ref 4.2–5.4)
SODIUM SERPL-SCNC: 135 MMOL/L (ref 136–145)
SODIUM SERPL-SCNC: 136 MMOL/L (ref 136–145)
WBC # BLD AUTO: 7.65 K/UL (ref 4.5–11)

## 2024-03-05 PROCEDURE — 27000982 HC MATTRESS, MATRIX LAL RENTAL

## 2024-03-05 PROCEDURE — 99232 SBSQ HOSP IP/OBS MODERATE 35: CPT | Mod: FS,,, | Performed by: FAMILY MEDICINE

## 2024-03-05 PROCEDURE — 25000003 PHARM REV CODE 250: Performed by: NURSE PRACTITIONER

## 2024-03-05 PROCEDURE — 25000003 PHARM REV CODE 250

## 2024-03-05 PROCEDURE — 80053 COMPREHEN METABOLIC PANEL: CPT | Performed by: NURSE PRACTITIONER

## 2024-03-05 PROCEDURE — 25000242 PHARM REV CODE 250 ALT 637 W/ HCPCS: Performed by: NURSE PRACTITIONER

## 2024-03-05 PROCEDURE — 94640 AIRWAY INHALATION TREATMENT: CPT

## 2024-03-05 PROCEDURE — 85025 COMPLETE CBC W/AUTO DIFF WBC: CPT | Performed by: NURSE PRACTITIONER

## 2024-03-05 PROCEDURE — 63600175 PHARM REV CODE 636 W HCPCS: Performed by: NURSE PRACTITIONER

## 2024-03-05 PROCEDURE — 27000944

## 2024-03-05 PROCEDURE — 94761 N-INVAS EAR/PLS OXIMETRY MLT: CPT

## 2024-03-05 PROCEDURE — 11000001 HC ACUTE MED/SURG PRIVATE ROOM

## 2024-03-05 PROCEDURE — 99900035 HC TECH TIME PER 15 MIN (STAT)

## 2024-03-05 PROCEDURE — 80048 BASIC METABOLIC PNL TOTAL CA: CPT | Mod: XB | Performed by: NURSE PRACTITIONER

## 2024-03-05 PROCEDURE — 27000221 HC OXYGEN, UP TO 24 HOURS

## 2024-03-05 RX ADMIN — MUPIROCIN: 20 OINTMENT TOPICAL at 08:03

## 2024-03-05 RX ADMIN — POTASSIUM CHLORIDE 20 MEQ: 1500 TABLET, EXTENDED RELEASE ORAL at 08:03

## 2024-03-05 RX ADMIN — METOPROLOL SUCCINATE 100 MG: 50 TABLET, EXTENDED RELEASE ORAL at 08:03

## 2024-03-05 RX ADMIN — LEVOTHYROXINE SODIUM 75 MCG: 0.03 TABLET ORAL at 05:03

## 2024-03-05 RX ADMIN — AMIODARONE HYDROCHLORIDE 200 MG: 200 TABLET ORAL at 08:03

## 2024-03-05 RX ADMIN — NITROGLYCERIN 1 INCH: 20 OINTMENT TOPICAL at 08:03

## 2024-03-05 RX ADMIN — SENNOSIDES AND DOCUSATE SODIUM 1 TABLET: 8.6; 5 TABLET ORAL at 09:03

## 2024-03-05 RX ADMIN — NITROGLYCERIN 1 INCH: 20 OINTMENT TOPICAL at 09:03

## 2024-03-05 RX ADMIN — IPRATROPIUM BROMIDE AND ALBUTEROL SULFATE 3 ML: 2.5; .5 SOLUTION RESPIRATORY (INHALATION) at 07:03

## 2024-03-05 RX ADMIN — APIXABAN 5 MG: 2.5 TABLET, FILM COATED ORAL at 09:03

## 2024-03-05 RX ADMIN — BENZONATATE 100 MG: 100 CAPSULE ORAL at 05:03

## 2024-03-05 RX ADMIN — APIXABAN 5 MG: 2.5 TABLET, FILM COATED ORAL at 08:03

## 2024-03-05 RX ADMIN — BENZONATATE 100 MG: 100 CAPSULE ORAL at 11:03

## 2024-03-05 RX ADMIN — LOSARTAN POTASSIUM 50 MG: 50 TABLET, FILM COATED ORAL at 08:03

## 2024-03-05 RX ADMIN — FUROSEMIDE 40 MG: 10 INJECTION, SOLUTION INTRAMUSCULAR; INTRAVENOUS at 05:03

## 2024-03-05 RX ADMIN — ATORVASTATIN CALCIUM 20 MG: 10 TABLET, FILM COATED ORAL at 09:03

## 2024-03-05 RX ADMIN — IPRATROPIUM BROMIDE AND ALBUTEROL SULFATE 3 ML: 2.5; .5 SOLUTION RESPIRATORY (INHALATION) at 01:03

## 2024-03-05 RX ADMIN — POTASSIUM BICARBONATE 35 MEQ: 391 TABLET, EFFERVESCENT ORAL at 11:03

## 2024-03-05 RX ADMIN — POTASSIUM BICARBONATE 35 MEQ: 391 TABLET, EFFERVESCENT ORAL at 08:03

## 2024-03-05 RX ADMIN — ASPIRIN 81 MG: 81 TABLET, COATED ORAL at 08:03

## 2024-03-05 RX ADMIN — MELATONIN TAB 3 MG 6 MG: 3 TAB at 09:03

## 2024-03-05 RX ADMIN — MUPIROCIN: 20 OINTMENT TOPICAL at 09:03

## 2024-03-05 NOTE — PROGRESS NOTES
[facility] - Medical Surgical Unit  Hospital Medicine  Progress Note    Patient Name: Windy Swann  MRN: [id number]  Patient Class: IP- Inpatient   Admission Date: 3/2/2024  Length of Stay: 3 days  Attending Physician: Poli Hines IV, DO  Primary Care Provider: Alyssa, Primary Doctor        Subjective:     Principal Problem:Acute on chronic systolic CHF (congestive heart failure)        HPI:  Ms. Swann is a 89 year-old  female who is admitted to [facility] acute care services due to acute exacerbation on chronic congestive heart failure, chronic atrial fib with RVR, and acute hypoxia. She is a resident at [facility] and presented to the ED today per EMS with c/o shortness of breath that had been worsening since last night. SNF staff reported to the ED that patient's O2 sat was 80% on RA, has PMH of CHF and recently Lasix was increased from 10 mg daily to 40 mg po. She had been given her routine 40 mg po this am. EMS reported that they had given her a dose of Lasix 40 mg IVP just PTA and she was on O2 per NRBM en route. Upon arrival, Ms. Swann was alert and oriented with obvious dyspnea. She denied any chest pain today but reported that she had chest pain 2 days ago. Her O2 sat was 88%, but was complaining of discomfort with NRBM. She was switched to Venti mask and 40% and ultimately 50% and has been more tolerant of it. In the ED, her CXR shows worsening of interstitial and parenchymal densities from previous. Initial labs show WBC 8510 with H&H 12.3 and 38.6, plt [id number], INR 1.75, Mg 2.6, BNP 7385, K+ 4.1, Na 138, Bun 26, creatinine 1.33, lactic acid 1.3, albumin 3.3, Flu and COVID are negative. Troponin #1 29, troponin #2 30. EKG shows atrial fib with RVR and no other changes from previous. She was given  mg po, NTG 1 inch paste to chest, Duoneb tx, Morphin 2 mg IVP, Lasix 40 mg IVP x 2 doses, Zaroxolyn 5 mg po, and Amiodarone 150 mg IVPB bolus. Her current home meds  include Amiodarone 200mg daily, Metoprolol XL 100mg daily, and Eliquis 5 mg BID. EMR was reviewed. Previous echo shows restrictive cardiomyopathy with severe systolic dysfunction and severe mitral regurgitation and EF of 24%. After initial evaluation and treatment with little improvement, she was planned for transfer; however, patient voiced that she did not want transfer and further evaluation or intervention by cardiology even with risk of deterioration of her condition. She is alert and oriented and able to make her own healthcare decisions. She does not want any resuscitative efforts or intubation in case of deterioration. Her case was discussed with Dr. Ureña, [facility] Hospitalist. She was given the 2nd dose of Lasix and Zaroxolyn 5 mg po. Her initial HR was 130s. It did improve to 100-110s. Her O2 sats did improve from 88-90% to 93-94%. Total UOP was approx 600ml. BP has remained stable. She was accepted for admission here for continued diuresis            Overview/Hospital Course:  3/3/24 - patient awake and resting in bed at the time of the exam today.  She does still up your dyspneic and tachypneic but is on O2 of 40% and reports that her breathing is improved and she is comfortable at this time.  She did receive Zaroxolyn p.o. and is currently on Lasix 40 mg IV q.12 and topical nitro paste.  She continues to confirmed that she was not want any extraordinary measures taken.  We did discuss considering BiPAP if the patient's condition worsens but she reports that she has a attempted BiPAP in the past and was unable to tolerate it.  Case was discussed with Dr. Cueto and we will continue current care.    03/04/24 Awake and resting in bed. Continues to complain of having SOB- states it is not as bad as when she came in but that she continues to have SOB. Sat on 4 liters is 95% on 4 liters. Diuresing with lasix - creatinine has increased to 1.48 from 1.36 yesterday. Weight is up 1.3 lbs from yesterday. Last echo  suggestive of restrictive cardiomyopathy with severe systolic dysfunction and severe mitral regurgitation; EF around 20-25% . Continue lasix 40 mg IV daily.      03/04/24 continues to complain of SOB. States oxygen is not helping her any. O2 sat on 4 liters is 94%. She has diuresed 800 ccs this shift. Continues to have tachypnea and noted to JVD. Will increase lasix to 40 mg IV every 12 hours. Continue to weight daily, strict intake and output. Lactic acid level pending.  Lactic acid level is 2.0.    03/05/24 Resting in bed. States she is breathing easier this morning. Weight is down 2.5 lbs from yesterday. Creatinine is better at 1.38. (Creatinine 0.89 on 01/22/24) Potassium is low at 3.2. Will replace orally. Continue diuresis. Probable dc tomorrow.    Interval History: sob    Review of Systems   Constitutional:  Negative for appetite change.   Respiratory:  Positive for cough and shortness of breath.    Cardiovascular:  Negative for chest pain and palpitations.   Gastrointestinal:  Negative for abdominal pain, constipation, diarrhea, nausea and vomiting.   Genitourinary:  Negative for difficulty urinating and dysuria.   Musculoskeletal:  Negative for arthralgias.   Neurological:  Positive for weakness. Negative for headaches.     Objective:     Vital Signs (Most Recent):  Temp: 97.6 °F (36.4 °C) (03/05/24 0704)  Pulse: 94 (03/05/24 0716)  Resp: 20 (03/05/24 0716)  BP: 127/86 (03/05/24 0704)  SpO2: 96 % (03/05/24 0716) Vital Signs (24h Range):  Temp:  [96.1 °F (35.6 °C)-97.9 °F (36.6 °C)] 97.6 °F (36.4 °C)  Pulse:  [] 94  Resp:  [18-22] 20  SpO2:  [94 %-97 %] 96 %  BP: (121-155)/(74-90) 127/86     Weight: 72.8 kg (160 lb 8 oz)  Body mass index is 30.83 kg/m².    Intake/Output Summary (Last 24 hours) at 3/5/2024 1182  Last data filed at 3/5/2024 0617  Gross per 24 hour   Intake 1180 ml   Output 1650 ml   Net -470 ml         Physical Exam  HENT:      Head: Normocephalic.      Mouth/Throat:      Mouth: Mucous  membranes are moist.   Cardiovascular:      Rate and Rhythm: Normal rate. Rhythm irregular.      Pulses: Normal pulses.      Heart sounds: Normal heart sounds.   Pulmonary:      Effort: No respiratory distress.      Breath sounds: Normal breath sounds. No stridor. No wheezing, rhonchi or rales.      Comments: O2 sat on 4 liters is 95%  Less tachypneic today  Chest:      Chest wall: No tenderness.   Abdominal:      General: Bowel sounds are normal. There is no distension.      Palpations: Abdomen is soft. There is no mass.      Tenderness: There is no abdominal tenderness. There is no guarding or rebound.      Hernia: No hernia is present.   Musculoskeletal:         General: Normal range of motion.      Cervical back: Normal range of motion.      Right lower leg: No edema.      Left lower leg: No edema.   Skin:     General: Skin is warm and dry.   Neurological:      Mental Status: She is alert and oriented to person, place, and time.      Motor: Weakness present.   Psychiatric:         Mood and Affect: Mood normal.             Significant Labs: All pertinent labs within the past 24 hours have been reviewed.  Recent Lab Results         03/05/24  0556   03/04/24  1438        Albumin/Globulin Ratio 0.7         Albumin 3.0                  ALT 48         Anion Gap 6         AST 27         Baso # 0.06         Basophil % 0.8         BILIRUBIN TOTAL 1.1         BUN 27         BUN/CREAT RATIO 20         Calcium 9.1         Chloride 95         CO2 38         Creatinine 1.38         Differential Method Auto         eGFR 36         Eos # 0.12         Eos % 1.6         Globulin, Total 4.2         Glucose 112         Hematocrit 40.7         Hemoglobin 12.5         Lactic Acid Level   2.0       Lymph # 0.92         Lymph % 12.0         MCH 30.5         MCHC 30.7         MCV 99.3         Mono # 0.53         Mono % 6.9         MPV 10.0         Neutrophils, Abs 6.02         Neutrophils Relative 78.7         Platelet Count  284         Potassium 3.2         PROTEIN TOTAL 7.2         RBC 4.10         RDW 13.8         Sodium 136         WBC 7.65                 Significant Imaging: I have reviewed all pertinent imaging results/findings within the past 24 hours.    Assessment/Plan:      * Acute on chronic systolic CHF (congestive heart failure)  Echo    Interpretation Summary    Left Ventricle: The left ventricle is normal in size. Normal wall thickness. There is eccentric hypertrophy. Regional wall motion abnormalities present. Anterior and amy-septal wall appears akinetic There is severely reduced systolic function with a visually estimated ejection fraction of 20 - 25%. Biplane (2D) method of discs ejection fraction is 24%. Unable to assess diastolic function due to atrial fibrillation.    Right Ventricle: Normal right ventricular cavity size. Systolic function is normal.    Left Atrium: Left atrium is severely dilated.    Right Atrium: Right atrium is severely dilated.    Aortic Valve: The aortic valve is a trileaflet valve.    Mitral Valve: There is posterior leaflet sclerosis. There is annular dilation present. There is severe regurgitation.    Tricuspid Valve: There is severe regurgitation.    Pulmonic Valve: There is mild regurgitation.    Pulmonary Artery: The estimated pulmonary artery systolic pressure is 51 mmHg.    IVC/SVC: Elevated venous pressure at 15 mmHg.    ECHO suggestive of restrictive cardiomyopathy with severe systolic dysfunction and severe mitral regurgitation    BNP 7385, Troponin 29 and 30 in ED  CXR shows mod pleural effusion on right and small on left in addition to worsening of interstitial and parenchymal densities  Lasix 40 mg IVP per EMS, Total 80 mg IVP given in ED, zaroxolyn 5 mg po given in ED.  Lasix 40 mg IVP q12h,  mg daily, NTG paste q12h.   Monitor O2 sat and breathing.      03/04/24 continues to complain of sob, rales heard, tachypneic. Sat on 4 liters is 95%. Continue duiresis with  lasix 40 mg IV daily. Weigh daily    Continues to have tachypnea and sob- lasix increased to 40 mg iv every 12 hours    03/05/24 less sob this morning. Weight down 2.5 lbs from yesterday. Sat 95% on 4 liters. Continues to have some tachypnea but less so today. Continue with diuresis with 40 mg lasix every 12 hours. Continue strict intake and output , continue daily weights.    Atrial fibrillation with rapid ventricular response  Amiodarone 150 mg IVPB given in ED.  Cardiac monitor  Continue Eliquis 5 mg po BID, Amiodarone 200mg po daily, Metoprolol  mg po daily.      03/04/24 rate 108, irregular    Hypokalemia  Patient has hypokalemia which is Acute and currently controlled. Most recent potassium levels reviewed-   Lab Results   Component Value Date    K 3.2 (L) 03/05/2024   . Will continue potassium replacement per protocol and recheck repeat levels after replacement completed.       03/05/24 replace orally with potassium bicarb    Shortness of breath    03/05/24 less sob with diuresis- weight down 2.5 lbs from yesterday  Continue diuresis with lasix 40 mg Iv every 12 hours       Acute respiratory failure with hypoxia  Monitor O2 sats  O2 to keep sats > 92%  DNR/DNI    03/04/24 sat on 4 liters is 95%    03/05/24 breathing easier with diuresis- sat on 4 liters is 95%  Down 2.5 lbs from yesterday  Continue diuresis      VTE Risk Mitigation (From admission, onward)           Ordered     apixaban tablet 5 mg  2 times daily         03/02/24 2328     IP VTE HIGH RISK PATIENT  Once         03/02/24 2303     Place FABIOLA hose  Until discontinued         03/02/24 2303                    Discharge Planning   SELWYN:      Code Status: DNR   Is the patient medically ready for discharge?:     Reason for patient still in hospital (select all that apply): Treatment  Discharge Plan A: Skilled Nursing Facility                  ALVARO Roca  Department of Hospital Medicine   [facility] - [facility] Surgical  Unit

## 2024-03-05 NOTE — ASSESSMENT & PLAN NOTE
Patient has hypokalemia which is Acute and currently controlled. Most recent potassium levels reviewed-   Lab Results   Component Value Date    K 3.2 (L) 03/05/2024   . Will continue potassium replacement per protocol and recheck repeat levels after replacement completed.       03/05/24 replace orally with potassium bicarb

## 2024-03-05 NOTE — RESPIRATORY THERAPY
"Pt is saying that she feels "short of breath/doesn't feel like cannula is working". Her sats are between 94%-97%. I did water test for her to prove that cannula is working fine and she had the cannula on upside down at one point also. I changed out her cannula for a new one in hopes that it would make her feel a little more comfortable.  "

## 2024-03-05 NOTE — SUBJECTIVE & OBJECTIVE
Interval History: sob    Review of Systems   Constitutional:  Negative for appetite change.   Respiratory:  Positive for cough and shortness of breath.    Cardiovascular:  Negative for chest pain and palpitations.   Gastrointestinal:  Negative for abdominal pain, constipation, diarrhea, nausea and vomiting.   Genitourinary:  Negative for difficulty urinating and dysuria.   Musculoskeletal:  Negative for arthralgias.   Neurological:  Positive for weakness. Negative for headaches.     Objective:     Vital Signs (Most Recent):  Temp: 97.6 °F (36.4 °C) (03/05/24 0704)  Pulse: 94 (03/05/24 0716)  Resp: 20 (03/05/24 0716)  BP: 127/86 (03/05/24 0704)  SpO2: 96 % (03/05/24 0716) Vital Signs (24h Range):  Temp:  [96.1 °F (35.6 °C)-97.9 °F (36.6 °C)] 97.6 °F (36.4 °C)  Pulse:  [] 94  Resp:  [18-22] 20  SpO2:  [94 %-97 %] 96 %  BP: (121-155)/(74-90) 127/86     Weight: 72.8 kg (160 lb 8 oz)  Body mass index is 30.83 kg/m².    Intake/Output Summary (Last 24 hours) at 3/5/2024 0725  Last data filed at 3/5/2024 0617  Gross per 24 hour   Intake 1180 ml   Output 1650 ml   Net -470 ml         Physical Exam  HENT:      Head: Normocephalic.      Mouth/Throat:      Mouth: Mucous membranes are moist.   Cardiovascular:      Rate and Rhythm: Normal rate. Rhythm irregular.      Pulses: Normal pulses.      Heart sounds: Normal heart sounds.   Pulmonary:      Effort: No respiratory distress.      Breath sounds: Normal breath sounds. No stridor. No wheezing, rhonchi or rales.      Comments: O2 sat on 4 liters is 95%  Less tachypneic today  Chest:      Chest wall: No tenderness.   Abdominal:      General: Bowel sounds are normal. There is no distension.      Palpations: Abdomen is soft. There is no mass.      Tenderness: There is no abdominal tenderness. There is no guarding or rebound.      Hernia: No hernia is present.   Musculoskeletal:         General: Normal range of motion.      Cervical back: Normal range of motion.      Right  lower leg: No edema.      Left lower leg: No edema.   Skin:     General: Skin is warm and dry.   Neurological:      Mental Status: She is alert and oriented to person, place, and time.      Motor: Weakness present.   Psychiatric:         Mood and Affect: Mood normal.             Significant Labs: All pertinent labs within the past 24 hours have been reviewed.  Recent Lab Results         03/05/24  0556   03/04/24  1438        Albumin/Globulin Ratio 0.7         Albumin 3.0                  ALT 48         Anion Gap 6         AST 27         Baso # 0.06         Basophil % 0.8         BILIRUBIN TOTAL 1.1         BUN 27         BUN/CREAT RATIO 20         Calcium 9.1         Chloride 95         CO2 38         Creatinine 1.38         Differential Method Auto         eGFR 36         Eos # 0.12         Eos % 1.6         Globulin, Total 4.2         Glucose 112         Hematocrit 40.7         Hemoglobin 12.5         Lactic Acid Level   2.0       Lymph # 0.92         Lymph % 12.0         MCH 30.5         MCHC 30.7         MCV 99.3         Mono # 0.53         Mono % 6.9         MPV 10.0         Neutrophils, Abs 6.02         Neutrophils Relative 78.7         Platelet Count 284         Potassium 3.2         PROTEIN TOTAL 7.2         RBC 4.10         RDW 13.8         Sodium 136         WBC 7.65                 Significant Imaging: I have reviewed all pertinent imaging results/findings within the past 24 hours.

## 2024-03-05 NOTE — PLAN OF CARE
Problem: Infection  Goal: Absence of Infection Signs and Symptoms  Outcome: Ongoing, Progressing  Intervention: Prevent or Manage Infection  Flowsheets (Taken 3/5/2024 1422)  Infection Management: aseptic technique maintained     Problem: Adult Inpatient Plan of Care  Goal: Plan of Care Review  Outcome: Ongoing, Progressing  Goal: Patient-Specific Goal (Individualized)  Outcome: Ongoing, Progressing  Goal: Absence of Hospital-Acquired Illness or Injury  Outcome: Ongoing, Progressing  Goal: Optimal Comfort and Wellbeing  Outcome: Ongoing, Progressing  Goal: Readiness for Transition of Care  Outcome: Ongoing, Progressing  Intervention: Mutually Develop Transition Plan  Flowsheets (Taken 3/5/2024 1422)  Transportation Anticipated: family or friend will provide     Problem: Fall Injury Risk  Goal: Absence of Fall and Fall-Related Injury  Outcome: Ongoing, Progressing  Intervention: Identify and Manage Contributors  Flowsheets (Taken 3/5/2024 1422)  Self-Care Promotion: independence encouraged  Medication Review/Management: medications reviewed     Problem: Gas Exchange Impaired  Goal: Optimal Gas Exchange  Outcome: Ongoing, Progressing

## 2024-03-05 NOTE — ASSESSMENT & PLAN NOTE
Echo    Interpretation Summary    Left Ventricle: The left ventricle is normal in size. Normal wall thickness. There is eccentric hypertrophy. Regional wall motion abnormalities present. Anterior and amy-septal wall appears akinetic There is severely reduced systolic function with a visually estimated ejection fraction of 20 - 25%. Biplane (2D) method of discs ejection fraction is 24%. Unable to assess diastolic function due to atrial fibrillation.    Right Ventricle: Normal right ventricular cavity size. Systolic function is normal.    Left Atrium: Left atrium is severely dilated.    Right Atrium: Right atrium is severely dilated.    Aortic Valve: The aortic valve is a trileaflet valve.    Mitral Valve: There is posterior leaflet sclerosis. There is annular dilation present. There is severe regurgitation.    Tricuspid Valve: There is severe regurgitation.    Pulmonic Valve: There is mild regurgitation.    Pulmonary Artery: The estimated pulmonary artery systolic pressure is 51 mmHg.    IVC/SVC: Elevated venous pressure at 15 mmHg.    ECHO suggestive of restrictive cardiomyopathy with severe systolic dysfunction and severe mitral regurgitation    BNP 7385, Troponin 29 and 30 in ED  CXR shows mod pleural effusion on right and small on left in addition to worsening of interstitial and parenchymal densities  Lasix 40 mg IVP per EMS, Total 80 mg IVP given in ED, zaroxolyn 5 mg po given in ED.  Lasix 40 mg IVP q12h,  mg daily, NTG paste q12h.   Monitor O2 sat and breathing.      03/04/24 continues to complain of sob, rales heard, tachypneic. Sat on 4 liters is 95%. Continue duiresis with lasix 40 mg IV daily. Weigh daily    Continues to have tachypnea and sob- lasix increased to 40 mg iv every 12 hours    03/05/24 less sob this morning. Weight down 2.5 lbs from yesterday. Sat 95% on 4 liters. Continues to have some tachypnea but less so today. Continue with diuresis with 40 mg lasix every 12 hours. Continue  strict intake and output , continue daily weights.

## 2024-03-05 NOTE — ASSESSMENT & PLAN NOTE
03/05/24 less sob with diuresis- weight down 2.5 lbs from yesterday  Continue diuresis with lasix 40 mg Iv every 12 hours

## 2024-03-05 NOTE — ASSESSMENT & PLAN NOTE
Monitor O2 sats  O2 to keep sats > 92%  DNR/DNI    03/04/24 sat on 4 liters is 95%    03/05/24 breathing easier with diuresis- sat on 4 liters is 95%  Down 2.5 lbs from yesterday  Continue diuresis

## 2024-03-06 LAB
ALBUMIN SERPL BCP-MCNC: 3.1 G/DL (ref 3.5–5)
ALBUMIN/GLOB SERPL: 0.9 {RATIO}
ALP SERPL-CCNC: 98 U/L (ref 55–142)
ALT SERPL W P-5'-P-CCNC: 37 U/L (ref 13–56)
ANION GAP SERPL CALCULATED.3IONS-SCNC: 5 MMOL/L (ref 7–16)
AST SERPL W P-5'-P-CCNC: 23 U/L (ref 15–37)
BASOPHILS # BLD AUTO: 0.07 K/UL (ref 0–0.2)
BASOPHILS NFR BLD AUTO: 1 % (ref 0–1)
BILIRUB SERPL-MCNC: 1 MG/DL (ref ?–1.2)
BUN SERPL-MCNC: 27 MG/DL (ref 7–18)
BUN/CREAT SERPL: 22 (ref 6–20)
CALCIUM SERPL-MCNC: 9.1 MG/DL (ref 8.5–10.1)
CHLORIDE SERPL-SCNC: 95 MMOL/L (ref 98–107)
CO2 SERPL-SCNC: 40 MMOL/L (ref 21–32)
CREAT SERPL-MCNC: 1.24 MG/DL (ref 0.55–1.02)
DIFFERENTIAL METHOD BLD: ABNORMAL
EGFR (NO RACE VARIABLE) (RUSH/TITUS): 41 ML/MIN/1.73M2
EOSINOPHIL # BLD AUTO: 0.12 K/UL (ref 0–0.5)
EOSINOPHIL NFR BLD AUTO: 1.7 % (ref 1–4)
ERYTHROCYTE [DISTWIDTH] IN BLOOD BY AUTOMATED COUNT: 13.9 % (ref 11.5–14.5)
GLOBULIN SER-MCNC: 3.6 G/DL (ref 2–4)
GLUCOSE SERPL-MCNC: 105 MG/DL (ref 74–106)
HCT VFR BLD AUTO: 39.5 % (ref 38–47)
HGB BLD-MCNC: 12.1 G/DL (ref 12–16)
LYMPHOCYTES # BLD AUTO: 0.95 K/UL (ref 1–4.8)
LYMPHOCYTES NFR BLD AUTO: 13.2 % (ref 27–41)
MCH RBC QN AUTO: 30.4 PG (ref 27–31)
MCHC RBC AUTO-ENTMCNC: 30.6 G/DL (ref 32–36)
MCV RBC AUTO: 99.2 FL (ref 80–96)
MONOCYTES # BLD AUTO: 0.57 K/UL (ref 0–0.8)
MONOCYTES NFR BLD AUTO: 7.9 % (ref 2–6)
MPC BLD CALC-MCNC: 9.6 FL (ref 9.4–12.4)
NEUTROPHILS # BLD AUTO: 5.48 K/UL (ref 1.8–7.7)
NEUTROPHILS NFR BLD AUTO: 76.2 % (ref 53–65)
OHS QRS DURATION: 138 MS
OHS QTC CALCULATION: 522 MS
PLATELET # BLD AUTO: 273 K/UL (ref 150–400)
POTASSIUM SERPL-SCNC: 3.6 MMOL/L (ref 3.5–5.1)
PROT SERPL-MCNC: 6.7 G/DL (ref 6.4–8.2)
RBC # BLD AUTO: 3.98 M/UL (ref 4.2–5.4)
SODIUM SERPL-SCNC: 136 MMOL/L (ref 136–145)
WBC # BLD AUTO: 7.19 K/UL (ref 4.5–11)

## 2024-03-06 PROCEDURE — 25000242 PHARM REV CODE 250 ALT 637 W/ HCPCS: Performed by: NURSE PRACTITIONER

## 2024-03-06 PROCEDURE — 99233 SBSQ HOSP IP/OBS HIGH 50: CPT | Mod: ,,, | Performed by: NURSE PRACTITIONER

## 2024-03-06 PROCEDURE — 63600175 PHARM REV CODE 636 W HCPCS: Performed by: NURSE PRACTITIONER

## 2024-03-06 PROCEDURE — 27000982 HC MATTRESS, MATRIX LAL RENTAL

## 2024-03-06 PROCEDURE — 80053 COMPREHEN METABOLIC PANEL: CPT | Performed by: NURSE PRACTITIONER

## 2024-03-06 PROCEDURE — 11000001 HC ACUTE MED/SURG PRIVATE ROOM

## 2024-03-06 PROCEDURE — 27000944

## 2024-03-06 PROCEDURE — 94640 AIRWAY INHALATION TREATMENT: CPT

## 2024-03-06 PROCEDURE — 25000003 PHARM REV CODE 250

## 2024-03-06 PROCEDURE — 85025 COMPLETE CBC W/AUTO DIFF WBC: CPT | Performed by: NURSE PRACTITIONER

## 2024-03-06 PROCEDURE — 25000003 PHARM REV CODE 250: Performed by: NURSE PRACTITIONER

## 2024-03-06 PROCEDURE — 94761 N-INVAS EAR/PLS OXIMETRY MLT: CPT

## 2024-03-06 PROCEDURE — 27000221 HC OXYGEN, UP TO 24 HOURS

## 2024-03-06 RX ORDER — POTASSIUM CHLORIDE 20 MEQ/1
20 TABLET, EXTENDED RELEASE ORAL ONCE
Status: DISCONTINUED | OUTPATIENT
Start: 2024-03-06 | End: 2024-03-06

## 2024-03-06 RX ADMIN — ATORVASTATIN CALCIUM 20 MG: 10 TABLET, FILM COATED ORAL at 08:03

## 2024-03-06 RX ADMIN — POTASSIUM CHLORIDE 20 MEQ: 1500 TABLET, EXTENDED RELEASE ORAL at 09:03

## 2024-03-06 RX ADMIN — SENNOSIDES AND DOCUSATE SODIUM 1 TABLET: 8.6; 5 TABLET ORAL at 09:03

## 2024-03-06 RX ADMIN — SENNOSIDES AND DOCUSATE SODIUM 1 TABLET: 8.6; 5 TABLET ORAL at 08:03

## 2024-03-06 RX ADMIN — APIXABAN 5 MG: 2.5 TABLET, FILM COATED ORAL at 09:03

## 2024-03-06 RX ADMIN — IPRATROPIUM BROMIDE AND ALBUTEROL SULFATE 3 ML: 2.5; .5 SOLUTION RESPIRATORY (INHALATION) at 01:03

## 2024-03-06 RX ADMIN — IPRATROPIUM BROMIDE AND ALBUTEROL SULFATE 3 ML: 2.5; .5 SOLUTION RESPIRATORY (INHALATION) at 07:03

## 2024-03-06 RX ADMIN — BENZONATATE 100 MG: 100 CAPSULE ORAL at 08:03

## 2024-03-06 RX ADMIN — FUROSEMIDE 40 MG: 10 INJECTION, SOLUTION INTRAMUSCULAR; INTRAVENOUS at 05:03

## 2024-03-06 RX ADMIN — MUPIROCIN: 20 OINTMENT TOPICAL at 08:03

## 2024-03-06 RX ADMIN — NITROGLYCERIN 1 INCH: 20 OINTMENT TOPICAL at 08:03

## 2024-03-06 RX ADMIN — LOSARTAN POTASSIUM 50 MG: 50 TABLET, FILM COATED ORAL at 09:03

## 2024-03-06 RX ADMIN — LEVOTHYROXINE SODIUM 75 MCG: 0.03 TABLET ORAL at 06:03

## 2024-03-06 RX ADMIN — MUPIROCIN: 20 OINTMENT TOPICAL at 09:03

## 2024-03-06 RX ADMIN — ASPIRIN 81 MG: 81 TABLET, COATED ORAL at 09:03

## 2024-03-06 RX ADMIN — METOPROLOL SUCCINATE 100 MG: 50 TABLET, EXTENDED RELEASE ORAL at 09:03

## 2024-03-06 RX ADMIN — FUROSEMIDE 40 MG: 10 INJECTION, SOLUTION INTRAMUSCULAR; INTRAVENOUS at 06:03

## 2024-03-06 RX ADMIN — IPRATROPIUM BROMIDE AND ALBUTEROL SULFATE 3 ML: 2.5; .5 SOLUTION RESPIRATORY (INHALATION) at 12:03

## 2024-03-06 RX ADMIN — APIXABAN 5 MG: 2.5 TABLET, FILM COATED ORAL at 08:03

## 2024-03-06 RX ADMIN — NITROGLYCERIN 1 INCH: 20 OINTMENT TOPICAL at 09:03

## 2024-03-06 RX ADMIN — AMIODARONE HYDROCHLORIDE 200 MG: 200 TABLET ORAL at 09:03

## 2024-03-06 NOTE — SUBJECTIVE & OBJECTIVE
Interval History: chf exacerbation, weakness    Review of Systems   Constitutional:  Negative for appetite change.   Respiratory:  Positive for cough and shortness of breath.    Cardiovascular:  Negative for chest pain and palpitations.   Gastrointestinal:  Negative for abdominal pain, constipation, diarrhea, nausea and vomiting.   Genitourinary:  Negative for difficulty urinating and dysuria.   Musculoskeletal:  Negative for arthralgias.   Neurological:  Positive for weakness. Negative for headaches.     Objective:     Vital Signs (Most Recent):  Temp: 97.3 °F (36.3 °C) (03/06/24 0759)  Pulse: 105 (03/06/24 0759)  Resp: 20 (03/06/24 0731)  BP: 111/68 (03/06/24 0759)  SpO2: 95 % (03/06/24 0759) Vital Signs (24h Range):  Temp:  [97.3 °F (36.3 °C)-98.2 °F (36.8 °C)] 97.3 °F (36.3 °C)  Pulse:  [] 105  Resp:  [18-28] 20  SpO2:  [94 %-98 %] 95 %  BP: ()/(67-81) 111/68     Weight: 72.1 kg (159 lb)  Body mass index is 30.54 kg/m².    Intake/Output Summary (Last 24 hours) at 3/6/2024 0944  Last data filed at 3/6/2024 0630  Gross per 24 hour   Intake 860 ml   Output 2100 ml   Net -1240 ml         Physical Exam  HENT:      Head: Normocephalic.      Mouth/Throat:      Mouth: Mucous membranes are moist.   Cardiovascular:      Rate and Rhythm: Normal rate. Rhythm irregular.      Pulses: Normal pulses.      Heart sounds: Normal heart sounds.   Pulmonary:      Effort: No respiratory distress.      Breath sounds: Normal breath sounds. No stridor. No wheezing, rhonchi or rales.      Comments: O2 sat on3 liters is 98 %   Respiratory rate is 28/minute   Chest:      Chest wall: No tenderness.   Abdominal:      General: Bowel sounds are normal. There is no distension.      Palpations: Abdomen is soft. There is no mass.      Tenderness: There is no abdominal tenderness. There is no guarding or rebound.      Hernia: No hernia is present.   Musculoskeletal:         General: Normal range of motion.      Cervical back: Normal  range of motion.      Right lower leg: No edema.      Left lower leg: No edema.   Skin:     General: Skin is warm and dry.   Neurological:      Mental Status: She is alert and oriented to person, place, and time.      Motor: Weakness present.   Psychiatric:         Mood and Affect: Mood normal.             Significant Labs: All pertinent labs within the past 24 hours have been reviewed.  Recent Lab Results         03/06/24  0516   03/05/24  1535        Albumin/Globulin Ratio 0.9         Albumin 3.1         ALP 98         ALT 37         Anion Gap 5   5       AST 23         Baso # 0.07         Basophil % 1.0         BILIRUBIN TOTAL 1.0         BUN 27   27       BUN/CREAT RATIO 22   20       Calcium 9.1   9.4       Chloride 95   94       CO2 40   40       Creatinine 1.24   1.32       Differential Method Auto         eGFR 41   38       Eos # 0.12         Eos % 1.7         Globulin, Total 3.6         Glucose 105   100       Hematocrit 39.5         Hemoglobin 12.1         Lymph # 0.95         Lymph % 13.2         MCH 30.4         MCHC 30.6         MCV 99.2         Mono # 0.57         Mono % 7.9         MPV 9.6         Neutrophils, Abs 5.48         Neutrophils Relative 76.2         Platelet Count 273         Potassium 3.6   4.4       PROTEIN TOTAL 6.7         RBC 3.98         RDW 13.9         Sodium 136   135       WBC 7.19                 Significant Imaging: I have reviewed all pertinent imaging results/findings within the past 24 hours.

## 2024-03-06 NOTE — ASSESSMENT & PLAN NOTE
Monitor O2 sats  O2 to keep sats > 92%  DNR/DNI    03/04/24 sat on 4 liters is 95%    03/05/24 breathing easier with diuresis- sat on 4 liters is 95%  Down 2.5 lbs from yesterday  Continue diuresis    03/06/24 sat 98% on 3 liters

## 2024-03-06 NOTE — ASSESSMENT & PLAN NOTE
Patient has hypokalemia which is Acute and currently controlled. Most recent potassium levels reviewed-   Lab Results   Component Value Date    K 3.6 03/06/2024   . Will continue potassium replacement per protocol and recheck repeat levels after replacement completed.       03/05/24 replace orally with potassium bicarb

## 2024-03-06 NOTE — PLAN OF CARE
Problem: Infection  Goal: Absence of Infection Signs and Symptoms  Outcome: Ongoing, Progressing     Problem: Adult Inpatient Plan of Care  Goal: Plan of Care Review  Outcome: Ongoing, Progressing  Goal: Patient-Specific Goal (Individualized)  Outcome: Ongoing, Progressing  Goal: Absence of Hospital-Acquired Illness or Injury  Outcome: Ongoing, Progressing  Goal: Optimal Comfort and Wellbeing  Outcome: Ongoing, Progressing  Goal: Readiness for Transition of Care  Outcome: Ongoing, Progressing     Problem: Fall Injury Risk  Goal: Absence of Fall and Fall-Related Injury  Outcome: Ongoing, Progressing     Problem: Gas Exchange Impaired  Goal: Optimal Gas Exchange  Outcome: Ongoing, Progressing

## 2024-03-06 NOTE — ASSESSMENT & PLAN NOTE
Echo    Interpretation Summary    Left Ventricle: The left ventricle is normal in size. Normal wall thickness. There is eccentric hypertrophy. Regional wall motion abnormalities present. Anterior and amy-septal wall appears akinetic There is severely reduced systolic function with a visually estimated ejection fraction of 20 - 25%. Biplane (2D) method of discs ejection fraction is 24%. Unable to assess diastolic function due to atrial fibrillation.    Right Ventricle: Normal right ventricular cavity size. Systolic function is normal.    Left Atrium: Left atrium is severely dilated.    Right Atrium: Right atrium is severely dilated.    Aortic Valve: The aortic valve is a trileaflet valve.    Mitral Valve: There is posterior leaflet sclerosis. There is annular dilation present. There is severe regurgitation.    Tricuspid Valve: There is severe regurgitation.    Pulmonic Valve: There is mild regurgitation.    Pulmonary Artery: The estimated pulmonary artery systolic pressure is 51 mmHg.    IVC/SVC: Elevated venous pressure at 15 mmHg.    ECHO suggestive of restrictive cardiomyopathy with severe systolic dysfunction and severe mitral regurgitation    BNP 7385, Troponin 29 and 30 in ED  CXR shows mod pleural effusion on right and small on left in addition to worsening of interstitial and parenchymal densities  Lasix 40 mg IVP per EMS, Total 80 mg IVP given in ED, zaroxolyn 5 mg po given in ED.  Lasix 40 mg IVP q12h,  mg daily, NTG paste q12h.   Monitor O2 sat and breathing.      03/04/24 continues to complain of sob, rales heard, tachypneic. Sat on 4 liters is 95%. Continue duiresis with lasix 40 mg IV daily. Weigh daily    Continues to have tachypnea and sob- lasix increased to 40 mg iv every 12 hours    03/05/24 less sob this morning. Weight down 2.5 lbs from yesterday. Sat 95% on 4 liters. Continues to have some tachypnea but less so today. Continue with diuresis with 40 mg lasix every 12 hours. Continue  strict intake and output , continue daily weights.    03/06/24 weight down another 1 1/2 lbs. Continue diuresis

## 2024-03-06 NOTE — PROGRESS NOTES
[facility] - Medical Surgical Unit  Hospital Medicine  Progress Note    Patient Name: Windy Swann  MRN: [id number]  Patient Class: IP- Inpatient   Admission Date: 3/2/2024  Length of Stay: 4 days  Attending Physician: Poli Hines IV, DO  Primary Care Provider: Alyssa, Primary Doctor        Subjective:     Principal Problem:Acute on chronic systolic CHF (congestive heart failure)        HPI:  Ms. Swann is a 89 year-old  female who is admitted to [facility] acute care services due to acute exacerbation on chronic congestive heart failure, chronic atrial fib with RVR, and acute hypoxia. She is a resident at [facility] and presented to the ED today per EMS with c/o shortness of breath that had been worsening since last night. SNF staff reported to the ED that patient's O2 sat was 80% on RA, has PMH of CHF and recently Lasix was increased from 10 mg daily to 40 mg po. She had been given her routine 40 mg po this am. EMS reported that they had given her a dose of Lasix 40 mg IVP just PTA and she was on O2 per NRBM en route. Upon arrival, Ms. Swann was alert and oriented with obvious dyspnea. She denied any chest pain today but reported that she had chest pain 2 days ago. Her O2 sat was 88%, but was complaining of discomfort with NRBM. She was switched to Venti mask and 40% and ultimately 50% and has been more tolerant of it. In the ED, her CXR shows worsening of interstitial and parenchymal densities from previous. Initial labs show WBC 8510 with H&H 12.3 and 38.6, plt [id number], INR 1.75, Mg 2.6, BNP 7385, K+ 4.1, Na 138, Bun 26, creatinine 1.33, lactic acid 1.3, albumin 3.3, Flu and COVID are negative. Troponin #1 29, troponin #2 30. EKG shows atrial fib with RVR and no other changes from previous. She was given  mg po, NTG 1 inch paste to chest, Duoneb tx, Morphin 2 mg IVP, Lasix 40 mg IVP x 2 doses, Zaroxolyn 5 mg po, and Amiodarone 150 mg IVPB bolus. Her current home meds  include Amiodarone 200mg daily, Metoprolol XL 100mg daily, and Eliquis 5 mg BID. EMR was reviewed. Previous echo shows restrictive cardiomyopathy with severe systolic dysfunction and severe mitral regurgitation and EF of 24%. After initial evaluation and treatment with little improvement, she was planned for transfer; however, patient voiced that she did not want transfer and further evaluation or intervention by cardiology even with risk of deterioration of her condition. She is alert and oriented and able to make her own healthcare decisions. She does not want any resuscitative efforts or intubation in case of deterioration. Her case was discussed with Dr. Ureña, [facility] Hospitalist. She was given the 2nd dose of Lasix and Zaroxolyn 5 mg po. Her initial HR was 130s. It did improve to 100-110s. Her O2 sats did improve from 88-90% to 93-94%. Total UOP was approx 600ml. BP has remained stable. She was accepted for admission here for continued diuresis            Overview/Hospital Course:  3/3/24 - patient awake and resting in bed at the time of the exam today.  She does still up your dyspneic and tachypneic but is on O2 of 40% and reports that her breathing is improved and she is comfortable at this time.  She did receive Zaroxolyn p.o. and is currently on Lasix 40 mg IV q.12 and topical nitro paste.  She continues to confirmed that she was not want any extraordinary measures taken.  We did discuss considering BiPAP if the patient's condition worsens but she reports that she has a attempted BiPAP in the past and was unable to tolerate it.  Case was discussed with Dr. Cueto and we will continue current care.    03/04/24 Awake and resting in bed. Continues to complain of having SOB- states it is not as bad as when she came in but that she continues to have SOB. Sat on 4 liters is 95% on 4 liters. Diuresing with lasix - creatinine has increased to 1.48 from 1.36 yesterday. Weight is up 1.3 lbs from yesterday. Last echo  suggestive of restrictive cardiomyopathy with severe systolic dysfunction and severe mitral regurgitation; EF around 20-25% . Continue lasix 40 mg IV daily.      03/04/24 continues to complain of SOB. States oxygen is not helping her any. O2 sat on 4 liters is 94%. She has diuresed 800 ccs this shift. Continues to have tachypnea and noted to JVD. Will increase lasix to 40 mg IV every 12 hours. Continue to weight daily, strict intake and output. Lactic acid level pending.  Lactic acid level is 2.0.    03/05/24 Resting in bed. States she is breathing easier this morning. Weight is down 2.5 lbs from yesterday. Creatinine is better at 1.38. (Creatinine 0.89 on 01/22/24) Potassium is low at 3.2. Will replace orally. Continue diuresis. Probable dc tomorrow.    03/06/24 Resting in bed. States she was breathing easier this morning until she wet the bed and nurses came in to clean her up. States she is tired out from that. Weight is down to 159 lbs- down another  1 1/2 lb from yesterday. She states she is just so tired. She does not why the [facility] has taken her already.  Talked with her re continued diuresis and she is agreeable. Also talked with her re hospice care as an option if she would like that- explained to her that hospice role is palliative- would keep her comfortable. For now, she wants to continue with diuresis.     Interval History: chf exacerbation, weakness    Review of Systems   Constitutional:  Negative for appetite change.   Respiratory:  Positive for cough and shortness of breath.    Cardiovascular:  Negative for chest pain and palpitations.   Gastrointestinal:  Negative for abdominal pain, constipation, diarrhea, nausea and vomiting.   Genitourinary:  Negative for difficulty urinating and dysuria.   Musculoskeletal:  Negative for arthralgias.   Neurological:  Positive for weakness. Negative for headaches.     Objective:     Vital Signs (Most Recent):  Temp: 97.3 °F (36.3 °C) (03/06/24 0759)  Pulse: 105  (03/06/24 0759)  Resp: 20 (03/06/24 0731)  BP: 111/68 (03/06/24 0759)  SpO2: 95 % (03/06/24 0759) Vital Signs (24h Range):  Temp:  [97.3 °F (36.3 °C)-98.2 °F (36.8 °C)] 97.3 °F (36.3 °C)  Pulse:  [] 105  Resp:  [18-28] 20  SpO2:  [94 %-98 %] 95 %  BP: ()/(67-81) 111/68     Weight: 72.1 kg (159 lb)  Body mass index is 30.54 kg/m².    Intake/Output Summary (Last 24 hours) at 3/6/2024 0992  Last data filed at 3/6/2024 0630  Gross per 24 hour   Intake 860 ml   Output 2100 ml   Net -1240 ml         Physical Exam  HENT:      Head: Normocephalic.      Mouth/Throat:      Mouth: Mucous membranes are moist.   Cardiovascular:      Rate and Rhythm: Normal rate. Rhythm irregular.      Pulses: Normal pulses.      Heart sounds: Normal heart sounds.   Pulmonary:      Effort: No respiratory distress.      Breath sounds: Normal breath sounds. No stridor. No wheezing, rhonchi or rales.      Comments: O2 sat on3 liters is 98 %   Respiratory rate is 28/minute   Chest:      Chest wall: No tenderness.   Abdominal:      General: Bowel sounds are normal. There is no distension.      Palpations: Abdomen is soft. There is no mass.      Tenderness: There is no abdominal tenderness. There is no guarding or rebound.      Hernia: No hernia is present.   Musculoskeletal:         General: Normal range of motion.      Cervical back: Normal range of motion.      Right lower leg: No edema.      Left lower leg: No edema.   Skin:     General: Skin is warm and dry.   Neurological:      Mental Status: She is alert and oriented to person, place, and time.      Motor: Weakness present.   Psychiatric:         Mood and Affect: Mood normal.             Significant Labs: All pertinent labs within the past 24 hours have been reviewed.  Recent Lab Results         03/06/24  0516   03/05/24  1535        Albumin/Globulin Ratio 0.9         Albumin 3.1         ALP 98         ALT 37         Anion Gap 5   5       AST 23         Baso # 0.07         Basophil %  1.0         BILIRUBIN TOTAL 1.0         BUN 27   27       BUN/CREAT RATIO 22   20       Calcium 9.1   9.4       Chloride 95   94       CO2 40   40       Creatinine 1.24   1.32       Differential Method Auto         eGFR 41   38       Eos # 0.12         Eos % 1.7         Globulin, Total 3.6         Glucose 105   100       Hematocrit 39.5         Hemoglobin 12.1         Lymph # 0.95         Lymph % 13.2         MCH 30.4         MCHC 30.6         MCV 99.2         Mono # 0.57         Mono % 7.9         MPV 9.6         Neutrophils, Abs 5.48         Neutrophils Relative 76.2         Platelet Count 273         Potassium 3.6   4.4       PROTEIN TOTAL 6.7         RBC 3.98         RDW 13.9         Sodium 136   135       WBC 7.19                 Significant Imaging: I have reviewed all pertinent imaging results/findings within the past 24 hours.    Assessment/Plan:      * Acute on chronic systolic CHF (congestive heart failure)  Echo    Interpretation Summary    Left Ventricle: The left ventricle is normal in size. Normal wall thickness. There is eccentric hypertrophy. Regional wall motion abnormalities present. Anterior and amy-septal wall appears akinetic There is severely reduced systolic function with a visually estimated ejection fraction of 20 - 25%. Biplane (2D) method of discs ejection fraction is 24%. Unable to assess diastolic function due to atrial fibrillation.    Right Ventricle: Normal right ventricular cavity size. Systolic function is normal.    Left Atrium: Left atrium is severely dilated.    Right Atrium: Right atrium is severely dilated.    Aortic Valve: The aortic valve is a trileaflet valve.    Mitral Valve: There is posterior leaflet sclerosis. There is annular dilation present. There is severe regurgitation.    Tricuspid Valve: There is severe regurgitation.    Pulmonic Valve: There is mild regurgitation.    Pulmonary Artery: The estimated pulmonary artery systolic pressure is 51 mmHg.    IVC/SVC:  Elevated venous pressure at 15 mmHg.    ECHO suggestive of restrictive cardiomyopathy with severe systolic dysfunction and severe mitral regurgitation    BNP 7385, Troponin 29 and 30 in ED  CXR shows mod pleural effusion on right and small on left in addition to worsening of interstitial and parenchymal densities  Lasix 40 mg IVP per EMS, Total 80 mg IVP given in ED, zaroxolyn 5 mg po given in ED.  Lasix 40 mg IVP q12h,  mg daily, NTG paste q12h.   Monitor O2 sat and breathing.      03/04/24 continues to complain of sob, rales heard, tachypneic. Sat on 4 liters is 95%. Continue duiresis with lasix 40 mg IV daily. Weigh daily    Continues to have tachypnea and sob- lasix increased to 40 mg iv every 12 hours    03/05/24 less sob this morning. Weight down 2.5 lbs from yesterday. Sat 95% on 4 liters. Continues to have some tachypnea but less so today. Continue with diuresis with 40 mg lasix every 12 hours. Continue strict intake and output , continue daily weights.    03/06/24 weight down another 1 1/2 lbs. Continue diuresis    Atrial fibrillation with rapid ventricular response  Amiodarone 150 mg IVPB given in ED.  Cardiac monitor  Continue Eliquis 5 mg po BID, Amiodarone 200mg po daily, Metoprolol  mg po daily.      03/04/24 rate 108, irregular    Hypokalemia  Patient has hypokalemia which is Acute and currently controlled. Most recent potassium levels reviewed-   Lab Results   Component Value Date    K 3.6 03/06/2024   . Will continue potassium replacement per protocol and recheck repeat levels after replacement completed.       03/05/24 replace orally with potassium bicarb        Shortness of breath    03/05/24 less sob with diuresis- weight down 2.5 lbs from yesterday  Continue diuresis with lasix 40 mg Iv every 12 hours       Acute respiratory failure with hypoxia  Monitor O2 sats  O2 to keep sats > 92%  DNR/DNI    03/04/24 sat on 4 liters is 95%    03/05/24 breathing easier with diuresis- sat on 4  liters is 95%  Down 2.5 lbs from yesterday  Continue diuresis    03/06/24 sat 98% on 3 liters      VTE Risk Mitigation (From admission, onward)           Ordered     apixaban tablet 5 mg  2 times daily         03/02/24 2328     IP VTE HIGH RISK PATIENT  Once         03/02/24 2303     Place FABIOLA hose  Until discontinued         03/02/24 2303                    Discharge Planning   SELWYN:      Code Status: DNR   Is the patient medically ready for discharge?:     Reason for patient still in hospital (select all that apply): Treatment  Discharge Plan A: Skilled Nursing Facility                  ALVARO Roca  Department of Hospital Medicine   [facility] - Medical Surgical Unit

## 2024-03-07 VITALS
WEIGHT: 158.63 LBS | SYSTOLIC BLOOD PRESSURE: 116 MMHG | OXYGEN SATURATION: 96 % | HEART RATE: 110 BPM | TEMPERATURE: 98 F | DIASTOLIC BLOOD PRESSURE: 76 MMHG | BODY MASS INDEX: 29.95 KG/M2 | RESPIRATION RATE: 19 BRPM | HEIGHT: 61 IN

## 2024-03-07 LAB
ALBUMIN SERPL BCP-MCNC: 3.2 G/DL (ref 3.5–5)
ALBUMIN/GLOB SERPL: 0.8 {RATIO}
ALP SERPL-CCNC: 103 U/L (ref 55–142)
ALT SERPL W P-5'-P-CCNC: 41 U/L (ref 13–56)
ANION GAP SERPL CALCULATED.3IONS-SCNC: 2 MMOL/L (ref 7–16)
ANION GAP SERPL CALCULATED.3IONS-SCNC: 6 MMOL/L (ref 7–16)
AST SERPL W P-5'-P-CCNC: 29 U/L (ref 15–37)
BASOPHILS # BLD AUTO: 0.05 K/UL (ref 0–0.2)
BASOPHILS NFR BLD AUTO: 0.6 % (ref 0–1)
BILIRUB SERPL-MCNC: 1 MG/DL (ref ?–1.2)
BUN SERPL-MCNC: 24 MG/DL (ref 7–18)
BUN SERPL-MCNC: 24 MG/DL (ref 7–18)
BUN/CREAT SERPL: 19 (ref 6–20)
BUN/CREAT SERPL: 21 (ref 6–20)
CALCIUM SERPL-MCNC: 9.3 MG/DL (ref 8.5–10.1)
CALCIUM SERPL-MCNC: 9.4 MG/DL (ref 8.5–10.1)
CHLORIDE SERPL-SCNC: 93 MMOL/L (ref 98–107)
CHLORIDE SERPL-SCNC: 93 MMOL/L (ref 98–107)
CO2 SERPL-SCNC: 40 MMOL/L (ref 21–32)
CO2 SERPL-SCNC: 42 MMOL/L (ref 21–32)
CREAT SERPL-MCNC: 1.16 MG/DL (ref 0.55–1.02)
CREAT SERPL-MCNC: 1.24 MG/DL (ref 0.55–1.02)
DIFFERENTIAL METHOD BLD: ABNORMAL
EGFR (NO RACE VARIABLE) (RUSH/TITUS): 41 ML/MIN/1.73M2
EGFR (NO RACE VARIABLE) (RUSH/TITUS): 45 ML/MIN/1.73M2
EOSINOPHIL # BLD AUTO: 0.16 K/UL (ref 0–0.5)
EOSINOPHIL NFR BLD AUTO: 1.9 % (ref 1–4)
ERYTHROCYTE [DISTWIDTH] IN BLOOD BY AUTOMATED COUNT: 13.7 % (ref 11.5–14.5)
GLOBULIN SER-MCNC: 3.8 G/DL (ref 2–4)
GLUCOSE SERPL-MCNC: 113 MG/DL (ref 74–106)
GLUCOSE SERPL-MCNC: 136 MG/DL (ref 74–106)
HCT VFR BLD AUTO: 40.7 % (ref 38–47)
HGB BLD-MCNC: 12.7 G/DL (ref 12–16)
LYMPHOCYTES # BLD AUTO: 1.05 K/UL (ref 1–4.8)
LYMPHOCYTES NFR BLD AUTO: 12.4 % (ref 27–41)
MCH RBC QN AUTO: 30.7 PG (ref 27–31)
MCHC RBC AUTO-ENTMCNC: 31.2 G/DL (ref 32–36)
MCV RBC AUTO: 98.3 FL (ref 80–96)
MONOCYTES # BLD AUTO: 0.72 K/UL (ref 0–0.8)
MONOCYTES NFR BLD AUTO: 8.5 % (ref 2–6)
MPC BLD CALC-MCNC: 9.5 FL (ref 9.4–12.4)
NEUTROPHILS # BLD AUTO: 6.49 K/UL (ref 1.8–7.7)
NEUTROPHILS NFR BLD AUTO: 76.6 % (ref 53–65)
PLATELET # BLD AUTO: 277 K/UL (ref 150–400)
POTASSIUM SERPL-SCNC: 3.3 MMOL/L (ref 3.5–5.1)
POTASSIUM SERPL-SCNC: 3.9 MMOL/L (ref 3.5–5.1)
PROT SERPL-MCNC: 7 G/DL (ref 6.4–8.2)
RBC # BLD AUTO: 4.14 M/UL (ref 4.2–5.4)
SODIUM SERPL-SCNC: 134 MMOL/L (ref 136–145)
SODIUM SERPL-SCNC: 135 MMOL/L (ref 136–145)
WBC # BLD AUTO: 8.47 K/UL (ref 4.5–11)

## 2024-03-07 PROCEDURE — 25000003 PHARM REV CODE 250: Performed by: NURSE PRACTITIONER

## 2024-03-07 PROCEDURE — 85025 COMPLETE CBC W/AUTO DIFF WBC: CPT | Performed by: NURSE PRACTITIONER

## 2024-03-07 PROCEDURE — 25000242 PHARM REV CODE 250 ALT 637 W/ HCPCS: Performed by: NURSE PRACTITIONER

## 2024-03-07 PROCEDURE — 94640 AIRWAY INHALATION TREATMENT: CPT

## 2024-03-07 PROCEDURE — 80053 COMPREHEN METABOLIC PANEL: CPT | Performed by: NURSE PRACTITIONER

## 2024-03-07 PROCEDURE — 94761 N-INVAS EAR/PLS OXIMETRY MLT: CPT

## 2024-03-07 PROCEDURE — 80048 BASIC METABOLIC PNL TOTAL CA: CPT | Mod: XB | Performed by: NURSE PRACTITIONER

## 2024-03-07 PROCEDURE — 25000003 PHARM REV CODE 250

## 2024-03-07 PROCEDURE — 63600175 PHARM REV CODE 636 W HCPCS: Performed by: NURSE PRACTITIONER

## 2024-03-07 PROCEDURE — 99239 HOSP IP/OBS DSCHRG MGMT >30: CPT | Mod: ,,, | Performed by: NURSE PRACTITIONER

## 2024-03-07 PROCEDURE — 27000221 HC OXYGEN, UP TO 24 HOURS

## 2024-03-07 RX ORDER — AMOXICILLIN 250 MG
1 CAPSULE ORAL 2 TIMES DAILY
Qty: 60 TABLET | Refills: 0 | Status: SHIPPED | OUTPATIENT
Start: 2024-03-07 | End: 2024-04-06

## 2024-03-07 RX ORDER — POTASSIUM CHLORIDE 20 MEQ/1
20 TABLET, EXTENDED RELEASE ORAL DAILY
Qty: 30 TABLET | Refills: 0 | Status: SHIPPED | OUTPATIENT
Start: 2024-03-08 | End: 2024-04-07

## 2024-03-07 RX ADMIN — POTASSIUM CHLORIDE 20 MEQ: 1500 TABLET, EXTENDED RELEASE ORAL at 09:03

## 2024-03-07 RX ADMIN — MUPIROCIN: 20 OINTMENT TOPICAL at 09:03

## 2024-03-07 RX ADMIN — IPRATROPIUM BROMIDE AND ALBUTEROL SULFATE 3 ML: 2.5; .5 SOLUTION RESPIRATORY (INHALATION) at 07:03

## 2024-03-07 RX ADMIN — ASPIRIN 81 MG: 81 TABLET, COATED ORAL at 09:03

## 2024-03-07 RX ADMIN — AMIODARONE HYDROCHLORIDE 200 MG: 200 TABLET ORAL at 09:03

## 2024-03-07 RX ADMIN — BENZONATATE 100 MG: 100 CAPSULE ORAL at 02:03

## 2024-03-07 RX ADMIN — POTASSIUM BICARBONATE 35 MEQ: 391 TABLET, EFFERVESCENT ORAL at 11:03

## 2024-03-07 RX ADMIN — NITROGLYCERIN 1 INCH: 20 OINTMENT TOPICAL at 09:03

## 2024-03-07 RX ADMIN — LOSARTAN POTASSIUM 50 MG: 50 TABLET, FILM COATED ORAL at 09:03

## 2024-03-07 RX ADMIN — FUROSEMIDE 40 MG: 10 INJECTION, SOLUTION INTRAMUSCULAR; INTRAVENOUS at 06:03

## 2024-03-07 RX ADMIN — IPRATROPIUM BROMIDE AND ALBUTEROL SULFATE 3 ML: 2.5; .5 SOLUTION RESPIRATORY (INHALATION) at 12:03

## 2024-03-07 RX ADMIN — LEVOTHYROXINE SODIUM 75 MCG: 0.03 TABLET ORAL at 06:03

## 2024-03-07 RX ADMIN — APIXABAN 5 MG: 2.5 TABLET, FILM COATED ORAL at 09:03

## 2024-03-07 RX ADMIN — IPRATROPIUM BROMIDE AND ALBUTEROL SULFATE 3 ML: 2.5; .5 SOLUTION RESPIRATORY (INHALATION) at 01:03

## 2024-03-07 RX ADMIN — SENNOSIDES AND DOCUSATE SODIUM 1 TABLET: 8.6; 5 TABLET ORAL at 09:03

## 2024-03-07 RX ADMIN — METOPROLOL SUCCINATE 100 MG: 50 TABLET, EXTENDED RELEASE ORAL at 09:03

## 2024-03-07 NOTE — ASSESSMENT & PLAN NOTE
Patient has hypokalemia which is Acute and currently controlled. Most recent potassium levels reviewed-   Lab Results   Component Value Date    K 3.3 (L) 03/07/2024   . Will continue potassium replacement per protocol and recheck repeat levels after replacement completed.       03/05/24 replace orally with potassium bicarb    03/07/24 potassium is 3.3 this morning - 20 meq given po   Follow up with bmp tomorrow

## 2024-03-07 NOTE — DISCHARGE SUMMARY
[facility] - Medical Surgical Unit  Hospital Medicine  Discharge Summary      Patient Name: Windy Swann  MRN: [id number]  [facility]: [id number]  Patient Class: IP- Inpatient  Admission Date: 3/2/2024  Hospital Length of Stay: 5 days  Discharge Date and Time:  03/07/2024 10:39 AM  Attending Physician: Poli Hines IV, DO   Discharging Provider: ALVARO Roca  Primary Care Provider: Alyssa Primary Doctor    Primary Care Team: Networked reference to record PCT     HPI:   Ms. Swann is a 89 year-old  female who is admitted to [facility] acute care services due to acute exacerbation on chronic congestive heart failure, chronic atrial fib with RVR, and acute hypoxia. She is a resident at [facility] and presented to the ED today per EMS with c/o shortness of breath that had been worsening since last night. SNF staff reported to the ED that patient's O2 sat was 80% on RA, has PMH of CHF and recently Lasix was increased from 10 mg daily to 40 mg po. She had been given her routine 40 mg po this am. EMS reported that they had given her a dose of Lasix 40 mg IVP just PTA and she was on O2 per NRBM en route. Upon arrival, Ms. Swann was alert and oriented with obvious dyspnea. She denied any chest pain today but reported that she had chest pain 2 days ago. Her O2 sat was 88%, but was complaining of discomfort with NRBM. She was switched to Venti mask and 40% and ultimately 50% and has been more tolerant of it. In the ED, her CXR shows worsening of interstitial and parenchymal densities from previous. Initial labs show WBC 8510 with H&H 12.3 and 38.6, plt [id number], INR 1.75, Mg 2.6, BNP 7385, K+ 4.1, Na 138, Bun 26, creatinine 1.33, lactic acid 1.3, albumin 3.3, Flu and COVID are negative. Troponin #1 29, troponin #2 30. EKG shows atrial fib with RVR and no other changes from previous. She was given  mg po, NTG 1 inch paste to chest, Duoneb tx, Morphin 2 mg IVP, Lasix 40 mg IVP x 2  doses, Zaroxolyn 5 mg po, and Amiodarone 150 mg IVPB bolus. Her current home meds include Amiodarone 200mg daily, Metoprolol XL 100mg daily, and Eliquis 5 mg BID. EMR was reviewed. Previous echo shows restrictive cardiomyopathy with severe systolic dysfunction and severe mitral regurgitation and EF of 24%. After initial evaluation and treatment with little improvement, she was planned for transfer; however, patient voiced that she did not want transfer and further evaluation or intervention by cardiology even with risk of deterioration of her condition. She is alert and oriented and able to make her own healthcare decisions. She does not want any resuscitative efforts or intubation in case of deterioration. Her case was discussed with Dr. Ureña, [facility] Hospitalist. She was given the 2nd dose of Lasix and Zaroxolyn 5 mg po. Her initial HR was 130s. It did improve to 100-110s. Her O2 sats did improve from 88-90% to 93-94%. Total UOP was approx 600ml. BP has remained stable. She was accepted for admission here for continued diuresis            * No surgery found *      Hospital Course:   3/3/24 - patient awake and resting in bed at the time of the exam today.  She does still up your dyspneic and tachypneic but is on O2 of 40% and reports that her breathing is improved and she is comfortable at this time.  She did receive Zaroxolyn p.o. and is currently on Lasix 40 mg IV q.12 and topical nitro paste.  She continues to confirmed that she was not want any extraordinary measures taken.  We did discuss considering BiPAP if the patient's condition worsens but she reports that she has a attempted BiPAP in the past and was unable to tolerate it.  Case was discussed with Dr. Cueto and we will continue current care.    03/04/24 Awake and resting in bed. Continues to complain of having SOB- states it is not as bad as when she came in but that she continues to have SOB. Sat on 4 liters is 95% on 4 liters. Diuresing with lasix -  creatinine has increased to 1.48 from 1.36 yesterday. Weight is up 1.3 lbs from yesterday. Last echo suggestive of restrictive cardiomyopathy with severe systolic dysfunction and severe mitral regurgitation; EF around 20-25% . Continue lasix 40 mg IV daily.      03/04/24 continues to complain of SOB. States oxygen is not helping her any. O2 sat on 4 liters is 94%. She has diuresed 800 ccs this shift. Continues to have tachypnea and noted to JVD. Will increase lasix to 40 mg IV every 12 hours. Continue to weight daily, strict intake and output. Lactic acid level pending.  Lactic acid level is 2.0.    03/05/24 Resting in bed. States she is breathing easier this morning. Weight is down 2.5 lbs from yesterday. Creatinine is better at 1.38. (Creatinine 0.89 on 01/22/24) Potassium is low at 3.2. Will replace orally. Continue diuresis. Probable dc tomorrow.    03/06/24 Resting in bed. States she was breathing easier this morning until she wet the bed and nurses came in to clean her up. States she is tired out from that. Weight is down to 159 lbs- down another  1 1/2 lb from yesterday. She states she is just so tired. She does not why the [facility] has taken her already.  Talked with her re continued diuresis and she is agreeable. Also talked with her re hospice care as an option if she would like that- explained to her that hospice role is palliative- would keep her comfortable. For now, she wants to continue with diuresis.     03/07/24 Resting in bed. Reports breathing easier this morning. Weight is down to 158 lb 9 oz. Creatinine improved to 1.16. Potassium 3.3 - potassium 20 meq given this morning. Will dc back to [facility] today. Will require o2 per nc at 2 liters. Continue daily lasix at 40 mg. Continue potassium 20 meq- check BMP tomorrow.     Goals of Care Treatment Preferences:  Code Status: DNR      Consults:     Pulmonary  Shortness of breath    03/05/24 less sob with diuresis- weight down 2.5 lbs from  yesterday  Continue diuresis with lasix 40 mg Iv every 12 hours       03/06/24 continue diuresis    03/07/24 no rales, weight down to 158.9   Dc to [facility] to continue daily lasix   Weigh daily  Report weight gain greater than 2-3 lbs to PCP  Continue supplemental o2 @ 2 liters    Acute respiratory failure with hypoxia  Monitor O2 sats  O2 to keep sats > 92%  DNR/DNI    03/04/24 sat on 4 liters is 95%    03/05/24 breathing easier with diuresis- sat on 4 liters is 95%  Down 2.5 lbs from yesterday  Continue diuresis    03/06/24 sat 98% on 3 liters    Cardiac/Vascular  * Acute on chronic systolic CHF (congestive heart failure)  Echo    Interpretation Summary    Left Ventricle: The left ventricle is normal in size. Normal wall thickness. There is eccentric hypertrophy. Regional wall motion abnormalities present. Anterior and amy-septal wall appears akinetic There is severely reduced systolic function with a visually estimated ejection fraction of 20 - 25%. Biplane (2D) method of discs ejection fraction is 24%. Unable to assess diastolic function due to atrial fibrillation.    Right Ventricle: Normal right ventricular cavity size. Systolic function is normal.    Left Atrium: Left atrium is severely dilated.    Right Atrium: Right atrium is severely dilated.    Aortic Valve: The aortic valve is a trileaflet valve.    Mitral Valve: There is posterior leaflet sclerosis. There is annular dilation present. There is severe regurgitation.    Tricuspid Valve: There is severe regurgitation.    Pulmonic Valve: There is mild regurgitation.    Pulmonary Artery: The estimated pulmonary artery systolic pressure is 51 mmHg.    IVC/SVC: Elevated venous pressure at 15 mmHg.    ECHO suggestive of restrictive cardiomyopathy with severe systolic dysfunction and severe mitral regurgitation    BNP 7385, Troponin 29 and 30 in ED  CXR shows mod pleural effusion on right and small on left in addition to worsening of interstitial and  parenchymal densities  Lasix 40 mg IVP per EMS, Total 80 mg IVP given in ED, zaroxolyn 5 mg po given in ED.  Lasix 40 mg IVP q12h,  mg daily, NTG paste q12h.   Monitor O2 sat and breathing.      03/04/24 continues to complain of sob, rales heard, tachypneic. Sat on 4 liters is 95%. Continue duiresis with lasix 40 mg IV daily. Weigh daily    Continues to have tachypnea and sob- lasix increased to 40 mg iv every 12 hours    03/05/24 less sob this morning. Weight down 2.5 lbs from yesterday. Sat 95% on 4 liters. Continues to have some tachypnea but less so today. Continue with diuresis with 40 mg lasix every 12 hours. Continue strict intake and output , continue daily weights.    03/06/24 weight down another 1 1/2 lbs. Continue diuresis      03/07/24 stable  Dc to [facility]  Continue daily lasix at 40 mg   Weigh daily    Atrial fibrillation with rapid ventricular response  Amiodarone 150 mg IVPB given in ED.  Cardiac monitor  Continue Eliquis 5 mg po BID, Amiodarone 200mg po daily, Metoprolol  mg po daily.      03/04/24 rate 108, irregular    03/07/24 continue eliquis, amiodarone and metoprolol    Renal/  Hypokalemia  Patient has hypokalemia which is Acute and currently controlled. Most recent potassium levels reviewed-   Lab Results   Component Value Date    K 3.3 (L) 03/07/2024   . Will continue potassium replacement per protocol and recheck repeat levels after replacement completed.       03/05/24 replace orally with potassium bicarb    03/07/24 potassium is 3.3 this morning - 20 meq given po   Follow up with bmp tomorrow          Final Active Diagnoses:    Diagnosis Date Noted POA    PRINCIPAL PROBLEM:  Acute on chronic systolic CHF (congestive heart failure) [I50.23] 03/02/2024 Yes    Atrial fibrillation with rapid ventricular response [I48.91] 01/14/2024 Yes    Hypokalemia [E87.6] 03/05/2024 No    Shortness of breath [R06.02] 03/04/2024 Yes    Acute respiratory failure with hypoxia [J96.01]  01/14/2024 Yes      Problems Resolved During this Admission:       Discharged Condition: stable    Disposition: Skilled Nursing Facility    Follow Up:    Patient Instructions:      Diet Cardiac       Significant Diagnostic Studies: Labs: All labs within the past 24 hours have been reviewed    Pending Diagnostic Studies:       None           Medications:  Reconciled Home Medications:      Medication List        START taking these medications      potassium chloride SA 20 MEQ tablet  Commonly known as: K-DUR,KLOR-CON  Take 1 tablet (20 mEq total) by mouth once daily.  Start taking on: March 8, 2024     senna-docusate 8.6-50 mg 8.6-50 mg per tablet  Commonly known as: PERICOLACE  Take 1 tablet by mouth 2 (two) times daily.            CONTINUE taking these medications      acetaminophen 325 MG tablet  Commonly known as: TYLENOL  Take 2 tablets (650 mg total) by mouth every 6 (six) hours as needed for Temperature greater than (100.4 F).     albuterol-ipratropium 2.5 mg-0.5 mg/3 mL nebulizer solution  Commonly known as: DUO-NEB  Take 3 mLs by nebulization every 6 (six) hours as needed for Wheezing. Rescue     amiodarone 200 MG Tab  Commonly known as: PACERONE  Take 1 tablet (200 mg total) by mouth once daily.     apixaban 5 mg Tab  Commonly known as: ELIQUIS  Take 1 tablet (5 mg total) by mouth 2 (two) times daily.     aspirin 81 MG EC tablet  Commonly known as: ECOTRIN  Take 1 tablet (81 mg total) by mouth once daily.     atorvastatin 20 MG tablet  Commonly known as: LIPITOR  Take 1 tablet (20 mg total) by mouth every evening.     calcium carbonate 200 mg calcium (500 mg) chewable tablet  Commonly known as: TUMS  Take 1 tablet (500 mg total) by mouth 2 (two) times daily as needed for Heartburn.     docusate sodium 100 MG capsule  Commonly known as: COLACE  Take 1 capsule (100 mg total) by mouth daily as needed for Constipation.     furosemide 40 MG tablet  Commonly known as: LASIX  Take 1 tablet (40 mg total) by mouth  once daily.     levothyroxine 75 MCG tablet  Commonly known as: SYNTHROID  Take 75 mcg by mouth before breakfast.     losartan 50 MG tablet  Commonly known as: COZAAR  Take 1 tablet (50 mg total) by mouth once daily.     melatonin 3 mg tablet  Commonly known as: MELATIN  Take 2 tablets (6 mg total) by mouth nightly as needed for Insomnia.     metoprolol succinate 100 MG 24 hr tablet  Commonly known as: TOPROL-XL  Take 1 tablet (100 mg total) by mouth once daily.     traZODone 50 MG tablet  Commonly known as: DESYREL  Take 0.5 tablets (25 mg total) by mouth nightly as needed for Insomnia.              Indwelling Lines/Drains at time of discharge:   Lines/Drains/Airways       Drain  Duration             Female External Urinary Catheter w/ Suction 03/03/24 0830 4 days                    Time spent on the discharge of patient: 35 minutes         ALVARO Roca  Department of Hospital Medicine  [facility] - [facility]

## 2024-03-07 NOTE — ASSESSMENT & PLAN NOTE
03/05/24 less sob with diuresis- weight down 2.5 lbs from yesterday  Continue diuresis with lasix 40 mg Iv every 12 hours       03/06/24 continue diuresis    03/07/24 no rales, weight down to 158.9   Dc to [facility] to continue daily lasix   Weigh daily  Report weight gain greater than 2-3 lbs to PCP  Continue supplemental o2 @ 2 liters

## 2024-03-07 NOTE — ASSESSMENT & PLAN NOTE
Echo    Interpretation Summary    Left Ventricle: The left ventricle is normal in size. Normal wall thickness. There is eccentric hypertrophy. Regional wall motion abnormalities present. Anterior and amy-septal wall appears akinetic There is severely reduced systolic function with a visually estimated ejection fraction of 20 - 25%. Biplane (2D) method of discs ejection fraction is 24%. Unable to assess diastolic function due to atrial fibrillation.    Right Ventricle: Normal right ventricular cavity size. Systolic function is normal.    Left Atrium: Left atrium is severely dilated.    Right Atrium: Right atrium is severely dilated.    Aortic Valve: The aortic valve is a trileaflet valve.    Mitral Valve: There is posterior leaflet sclerosis. There is annular dilation present. There is severe regurgitation.    Tricuspid Valve: There is severe regurgitation.    Pulmonic Valve: There is mild regurgitation.    Pulmonary Artery: The estimated pulmonary artery systolic pressure is 51 mmHg.    IVC/SVC: Elevated venous pressure at 15 mmHg.    ECHO suggestive of restrictive cardiomyopathy with severe systolic dysfunction and severe mitral regurgitation    BNP 7385, Troponin 29 and 30 in ED  CXR shows mod pleural effusion on right and small on left in addition to worsening of interstitial and parenchymal densities  Lasix 40 mg IVP per EMS, Total 80 mg IVP given in ED, zaroxolyn 5 mg po given in ED.  Lasix 40 mg IVP q12h,  mg daily, NTG paste q12h.   Monitor O2 sat and breathing.      03/04/24 continues to complain of sob, rales heard, tachypneic. Sat on 4 liters is 95%. Continue duiresis with lasix 40 mg IV daily. Weigh daily    Continues to have tachypnea and sob- lasix increased to 40 mg iv every 12 hours    03/05/24 less sob this morning. Weight down 2.5 lbs from yesterday. Sat 95% on 4 liters. Continues to have some tachypnea but less so today. Continue with diuresis with 40 mg lasix every 12 hours. Continue  strict intake and output , continue daily weights.    03/06/24 weight down another 1 1/2 lbs. Continue diuresis      03/07/24 stable  Dc to [facility]  Continue daily lasix at 40 mg   Weigh daily

## 2024-03-07 NOTE — PLAN OF CARE
Problem: Adult Inpatient Plan of Care  Goal: Patient-Specific Goal (Individualized)  Outcome: Ongoing, Progressing  Goal: Absence of Hospital-Acquired Illness or Injury  Outcome: Ongoing, Progressing  Goal: Readiness for Transition of Care  Outcome: Ongoing, Progressing     Problem: Fall Injury Risk  Goal: Absence of Fall and Fall-Related Injury  Outcome: Ongoing, Progressing

## 2024-03-07 NOTE — ASSESSMENT & PLAN NOTE
Amiodarone 150 mg IVPB given in ED.  Cardiac monitor  Continue Eliquis 5 mg po BID, Amiodarone 200mg po daily, Metoprolol  mg po daily.      03/04/24 rate 108, irregular    03/07/24 continue eliquis, amiodarone and metoprolol

## 2024-03-07 NOTE — PLAN OF CARE
[facility]  Discharge Final Note    Primary Care Provider: No, Primary Doctor    Expected Discharge Date: 3/7/2024    Final Discharge Note (most recent)       Final Note - 03/07/24 1115          Final Note    Assessment Type Final Discharge Note (P)      Anticipated Discharge Disposition Home or Self Care (P)      What phone number can be called within the next 1-3 days to see how you are doing after discharge? [phone] (P)      Hospital Resources/Appts/Education Provided Provided patient/caregiver with written discharge plan information;Provided education on problems/symptoms using teachback (P)         Post-Acute Status    Coverage Medicare A & B (P)      Patient choice form signed by patient/caregiver List with quality metrics by geographic area provided (P)      Discharge Delays None known at this time (P)                      Important Message from Medicare  Important Message from Medicare regarding Discharge Appeal Rights: Given to patient/caregiver, Explained to patient/caregiver, Signed/date by patient/caregiver     Date IMM was signed: 03/05/24  Time IMM was signed: 1430    Ms. Swann will discharge back to [facility] today.

## 2024-03-08 ENCOUNTER — LAB REQUISITION (OUTPATIENT)
Dept: LAB | Facility: HOSPITAL | Age: 89
End: 2024-03-08
Attending: FAMILY MEDICINE
Payer: MEDICARE

## 2024-03-08 DIAGNOSIS — I48.0 PAROXYSMAL ATRIAL FIBRILLATION: ICD-10-CM

## 2024-03-08 LAB
ANION GAP SERPL CALCULATED.3IONS-SCNC: -4 MMOL/L (ref 7–16)
BUN SERPL-MCNC: 24 MG/DL (ref 7–18)
BUN/CREAT SERPL: 21 (ref 6–20)
CALCIUM SERPL-MCNC: 9.3 MG/DL (ref 8.5–10.1)
CHLORIDE SERPL-SCNC: 93 MMOL/L (ref 98–107)
CO2 SERPL-SCNC: 43 MMOL/L (ref 21–32)
CREAT SERPL-MCNC: 1.17 MG/DL (ref 0.55–1.02)
EGFR (NO RACE VARIABLE) (RUSH/TITUS): 44 ML/MIN/1.73M2
GLUCOSE SERPL-MCNC: 128 MG/DL (ref 74–106)
POTASSIUM SERPL-SCNC: 3.7 MMOL/L (ref 3.5–5.1)
SODIUM SERPL-SCNC: 128 MMOL/L (ref 136–145)

## 2024-03-08 PROCEDURE — 80048 BASIC METABOLIC PNL TOTAL CA: CPT | Performed by: FAMILY MEDICINE

## 2024-03-09 LAB
BACTERIA BLD CULT: NORMAL
BACTERIA BLD CULT: NORMAL

## 2024-06-03 PROBLEM — J96.01 ACUTE RESPIRATORY FAILURE WITH HYPOXIA: Status: RESOLVED | Noted: 2024-01-14 | Resolved: 2024-06-03
